# Patient Record
Sex: FEMALE | Race: WHITE | Employment: OTHER | URBAN - METROPOLITAN AREA
[De-identification: names, ages, dates, MRNs, and addresses within clinical notes are randomized per-mention and may not be internally consistent; named-entity substitution may affect disease eponyms.]

---

## 2017-03-14 RX ORDER — PANTOPRAZOLE SODIUM 40 MG/1
40 TABLET, DELAYED RELEASE ORAL DAILY
Qty: 30 TABLET | Refills: 3 | Status: SHIPPED | OUTPATIENT
Start: 2017-03-14 | End: 2017-07-12 | Stop reason: SDUPTHER

## 2017-06-23 ENCOUNTER — OFFICE VISIT (OUTPATIENT)
Dept: FAMILY MEDICINE CLINIC | Age: 62
End: 2017-06-23
Payer: COMMERCIAL

## 2017-06-23 VITALS
DIASTOLIC BLOOD PRESSURE: 70 MMHG | RESPIRATION RATE: 20 BRPM | WEIGHT: 189 LBS | HEART RATE: 64 BPM | SYSTOLIC BLOOD PRESSURE: 100 MMHG | BODY MASS INDEX: 33.49 KG/M2 | TEMPERATURE: 97.5 F | HEIGHT: 63 IN

## 2017-06-23 DIAGNOSIS — R23.8 SKIN BULLA: Primary | ICD-10-CM

## 2017-06-23 PROCEDURE — 3014F SCREEN MAMMO DOC REV: CPT | Performed by: NURSE PRACTITIONER

## 2017-06-23 PROCEDURE — 1036F TOBACCO NON-USER: CPT | Performed by: NURSE PRACTITIONER

## 2017-06-23 PROCEDURE — G8417 CALC BMI ABV UP PARAM F/U: HCPCS | Performed by: NURSE PRACTITIONER

## 2017-06-23 PROCEDURE — G8427 DOCREV CUR MEDS BY ELIG CLIN: HCPCS | Performed by: NURSE PRACTITIONER

## 2017-06-23 PROCEDURE — 99213 OFFICE O/P EST LOW 20 MIN: CPT | Performed by: NURSE PRACTITIONER

## 2017-06-23 PROCEDURE — 3017F COLORECTAL CA SCREEN DOC REV: CPT | Performed by: NURSE PRACTITIONER

## 2017-06-26 ENCOUNTER — OFFICE VISIT (OUTPATIENT)
Dept: FAMILY MEDICINE CLINIC | Age: 62
End: 2017-06-26
Payer: COMMERCIAL

## 2017-06-26 ENCOUNTER — HOSPITAL ENCOUNTER (OUTPATIENT)
Age: 62
Setting detail: SPECIMEN
Discharge: HOME | End: 2017-06-26
Payer: COMMERCIAL

## 2017-06-26 VITALS
RESPIRATION RATE: 20 BRPM | HEART RATE: 64 BPM | HEIGHT: 63 IN | TEMPERATURE: 97.6 F | SYSTOLIC BLOOD PRESSURE: 96 MMHG | BODY MASS INDEX: 33.49 KG/M2 | DIASTOLIC BLOOD PRESSURE: 68 MMHG | WEIGHT: 189 LBS

## 2017-06-26 DIAGNOSIS — R23.8 SKIN BULLA: Primary | ICD-10-CM

## 2017-06-26 PROCEDURE — G8427 DOCREV CUR MEDS BY ELIG CLIN: HCPCS | Performed by: NURSE PRACTITIONER

## 2017-06-26 PROCEDURE — 3014F SCREEN MAMMO DOC REV: CPT | Performed by: NURSE PRACTITIONER

## 2017-06-26 PROCEDURE — 1036F TOBACCO NON-USER: CPT | Performed by: NURSE PRACTITIONER

## 2017-06-26 PROCEDURE — 99212 OFFICE O/P EST SF 10 MIN: CPT | Performed by: NURSE PRACTITIONER

## 2017-06-26 PROCEDURE — 3017F COLORECTAL CA SCREEN DOC REV: CPT | Performed by: NURSE PRACTITIONER

## 2017-06-26 PROCEDURE — 10060 I&D ABSCESS SIMPLE/SINGLE: CPT | Performed by: NURSE PRACTITIONER

## 2017-06-26 PROCEDURE — G8417 CALC BMI ABV UP PARAM F/U: HCPCS | Performed by: NURSE PRACTITIONER

## 2017-06-28 RX ORDER — METFORMIN HYDROCHLORIDE 500 MG/1
500 TABLET, EXTENDED RELEASE ORAL
Qty: 30 TABLET | Refills: 5 | Status: SHIPPED | OUTPATIENT
Start: 2017-06-28 | End: 2017-09-18 | Stop reason: SDUPTHER

## 2017-06-29 ENCOUNTER — OFFICE VISIT (OUTPATIENT)
Dept: FAMILY MEDICINE CLINIC | Age: 62
End: 2017-06-29

## 2017-06-29 VITALS
BODY MASS INDEX: 33.13 KG/M2 | SYSTOLIC BLOOD PRESSURE: 114 MMHG | TEMPERATURE: 98.4 F | WEIGHT: 187 LBS | RESPIRATION RATE: 18 BRPM | DIASTOLIC BLOOD PRESSURE: 78 MMHG | HEART RATE: 64 BPM

## 2017-06-29 DIAGNOSIS — R23.8 SKIN BULLA: Primary | ICD-10-CM

## 2017-06-29 LAB
CULTURE: ABNORMAL
CULTURE: ABNORMAL
DIRECT EXAM: ABNORMAL
DIRECT EXAM: ABNORMAL
Lab: ABNORMAL
SPECIMEN DESCRIPTION: ABNORMAL
STATUS: ABNORMAL

## 2017-06-29 PROCEDURE — 99024 POSTOP FOLLOW-UP VISIT: CPT | Performed by: NURSE PRACTITIONER

## 2017-06-29 PROCEDURE — 3017F COLORECTAL CA SCREEN DOC REV: CPT | Performed by: NURSE PRACTITIONER

## 2017-06-29 PROCEDURE — G8427 DOCREV CUR MEDS BY ELIG CLIN: HCPCS | Performed by: NURSE PRACTITIONER

## 2017-06-29 PROCEDURE — 1036F TOBACCO NON-USER: CPT | Performed by: NURSE PRACTITIONER

## 2017-06-29 PROCEDURE — G8417 CALC BMI ABV UP PARAM F/U: HCPCS | Performed by: NURSE PRACTITIONER

## 2017-06-29 PROCEDURE — 3014F SCREEN MAMMO DOC REV: CPT | Performed by: NURSE PRACTITIONER

## 2017-06-29 RX ORDER — MUPIROCIN CALCIUM 20 MG/G
CREAM TOPICAL
Qty: 1 TUBE | Refills: 0 | Status: SHIPPED | OUTPATIENT
Start: 2017-06-29 | End: 2017-07-26 | Stop reason: SDUPTHER

## 2017-06-30 ASSESSMENT — ENCOUNTER SYMPTOMS
COUGH: 0
ABDOMINAL PAIN: 0

## 2017-07-26 ENCOUNTER — OFFICE VISIT (OUTPATIENT)
Dept: FAMILY MEDICINE CLINIC | Age: 62
End: 2017-07-26
Payer: COMMERCIAL

## 2017-07-26 VITALS
HEART RATE: 78 BPM | DIASTOLIC BLOOD PRESSURE: 70 MMHG | BODY MASS INDEX: 33.17 KG/M2 | WEIGHT: 187.2 LBS | HEIGHT: 63 IN | RESPIRATION RATE: 16 BRPM | SYSTOLIC BLOOD PRESSURE: 112 MMHG

## 2017-07-26 DIAGNOSIS — N90.89 VULVAR IRRITATION: ICD-10-CM

## 2017-07-26 DIAGNOSIS — M25.551 RIGHT HIP PAIN: ICD-10-CM

## 2017-07-26 DIAGNOSIS — M54.50 CHRONIC BILATERAL LOW BACK PAIN WITHOUT SCIATICA: ICD-10-CM

## 2017-07-26 DIAGNOSIS — N95.2 ATROPHIC VAGINITIS: ICD-10-CM

## 2017-07-26 DIAGNOSIS — G89.29 CHRONIC BILATERAL LOW BACK PAIN WITHOUT SCIATICA: ICD-10-CM

## 2017-07-26 DIAGNOSIS — R00.2 HEART PALPITATIONS: ICD-10-CM

## 2017-07-26 DIAGNOSIS — R23.8 SKIN BULLA: Primary | ICD-10-CM

## 2017-07-26 DIAGNOSIS — M25.552 LEFT HIP PAIN: ICD-10-CM

## 2017-07-26 DIAGNOSIS — M10.9 ACUTE GOUT OF RIGHT FOOT, UNSPECIFIED CAUSE: ICD-10-CM

## 2017-07-26 PROCEDURE — 3014F SCREEN MAMMO DOC REV: CPT | Performed by: PEDIATRICS

## 2017-07-26 PROCEDURE — G8417 CALC BMI ABV UP PARAM F/U: HCPCS | Performed by: PEDIATRICS

## 2017-07-26 PROCEDURE — G8427 DOCREV CUR MEDS BY ELIG CLIN: HCPCS | Performed by: PEDIATRICS

## 2017-07-26 PROCEDURE — 1036F TOBACCO NON-USER: CPT | Performed by: PEDIATRICS

## 2017-07-26 PROCEDURE — 99214 OFFICE O/P EST MOD 30 MIN: CPT | Performed by: PEDIATRICS

## 2017-07-26 PROCEDURE — 3017F COLORECTAL CA SCREEN DOC REV: CPT | Performed by: PEDIATRICS

## 2017-07-26 RX ORDER — MUPIROCIN CALCIUM 20 MG/G
CREAM TOPICAL
Qty: 30 G | Refills: 1 | Status: SHIPPED | OUTPATIENT
Start: 2017-07-26 | End: 2017-08-09

## 2017-07-26 RX ORDER — METHYLPREDNISOLONE 4 MG/1
TABLET ORAL
Qty: 1 KIT | Refills: 0 | Status: SHIPPED | OUTPATIENT
Start: 2017-07-26 | End: 2017-08-01

## 2017-07-26 RX ORDER — CLOBETASOL PROPIONATE 0.5 MG/G
OINTMENT TOPICAL
Qty: 60 G | Refills: 1 | Status: SHIPPED | OUTPATIENT
Start: 2017-07-26 | End: 2018-09-17 | Stop reason: SDUPTHER

## 2017-07-26 ASSESSMENT — ENCOUNTER SYMPTOMS
COLOR CHANGE: 0
SINUS PRESSURE: 0
STRIDOR: 0
EYE DISCHARGE: 0
SHORTNESS OF BREATH: 0
ABDOMINAL PAIN: 0
BACK PAIN: 1
COUGH: 0
CONSTIPATION: 0
PHOTOPHOBIA: 0
RHINORRHEA: 0
NAUSEA: 0
SORE THROAT: 0
EYE REDNESS: 0
DIARRHEA: 0
WHEEZING: 0
VOMITING: 0

## 2017-07-31 RX ORDER — LEVOTHYROXINE SODIUM 175 MCG
175 TABLET ORAL EVERY OTHER DAY
Qty: 15 TABLET | Refills: 1 | Status: SHIPPED | OUTPATIENT
Start: 2017-07-31 | End: 2017-09-29 | Stop reason: SDUPTHER

## 2017-07-31 RX ORDER — LEVOTHYROXINE SODIUM 150 MCG
150 TABLET ORAL EVERY OTHER DAY
Qty: 30 TABLET | Refills: 5 | Status: SHIPPED | OUTPATIENT
Start: 2017-07-31 | End: 2018-08-02 | Stop reason: SDUPTHER

## 2017-07-31 RX ORDER — ATORVASTATIN CALCIUM 20 MG/1
20 TABLET, FILM COATED ORAL DAILY
Qty: 30 TABLET | Refills: 5 | Status: SHIPPED | OUTPATIENT
Start: 2017-07-31 | End: 2018-09-17 | Stop reason: SDUPTHER

## 2017-09-13 ENCOUNTER — HOSPITAL ENCOUNTER (OUTPATIENT)
Age: 62
Setting detail: SPECIMEN
Discharge: HOME | End: 2017-09-13
Payer: COMMERCIAL

## 2017-09-13 DIAGNOSIS — E03.9 HYPOTHYROIDISM, UNSPECIFIED TYPE: ICD-10-CM

## 2017-09-13 DIAGNOSIS — E78.5 HYPERLIPIDEMIA, UNSPECIFIED HYPERLIPIDEMIA TYPE: ICD-10-CM

## 2017-09-13 DIAGNOSIS — E66.9 DIABETES MELLITUS TYPE 2 IN OBESE (HCC): Chronic | ICD-10-CM

## 2017-09-13 DIAGNOSIS — E11.69 DIABETES MELLITUS TYPE 2 IN OBESE (HCC): Chronic | ICD-10-CM

## 2017-09-13 DIAGNOSIS — D72.829 LEUKOCYTOSIS, UNSPECIFIED TYPE: ICD-10-CM

## 2017-09-13 LAB
ALBUMIN SERPL-MCNC: 4.4 G/DL (ref 3.5–5.2)
ALBUMIN/GLOBULIN RATIO: 1.6 (ref 1–2.5)
ALP BLD-CCNC: 63 U/L (ref 35–104)
ALT SERPL-CCNC: 27 U/L (ref 5–33)
ANION GAP SERPL CALCULATED.3IONS-SCNC: 15 MMOL/L (ref 9–17)
AST SERPL-CCNC: 28 U/L
BILIRUB SERPL-MCNC: 0.46 MG/DL (ref 0.3–1.2)
BUN BLDV-MCNC: 19 MG/DL (ref 8–23)
BUN/CREAT BLD: ABNORMAL (ref 9–20)
CALCIUM SERPL-MCNC: 9.7 MG/DL (ref 8.6–10.4)
CHLORIDE BLD-SCNC: 99 MMOL/L (ref 98–107)
CHOLESTEROL/HDL RATIO: 3.3
CHOLESTEROL: 114 MG/DL
CO2: 26 MMOL/L (ref 20–31)
CREAT SERPL-MCNC: 0.85 MG/DL (ref 0.5–0.9)
CREATININE URINE: 122 MG/DL (ref 28–217)
ESTIMATED AVERAGE GLUCOSE: 160 MG/DL
GFR AFRICAN AMERICAN: >60 ML/MIN
GFR NON-AFRICAN AMERICAN: >60 ML/MIN
GFR SERPL CREATININE-BSD FRML MDRD: ABNORMAL ML/MIN/{1.73_M2}
GFR SERPL CREATININE-BSD FRML MDRD: ABNORMAL ML/MIN/{1.73_M2}
GLUCOSE BLD-MCNC: 120 MG/DL (ref 70–99)
HBA1C MFR BLD: 7.2 % (ref 4–6)
HCT VFR BLD CALC: 40.3 % (ref 36–46)
HDLC SERPL-MCNC: 35 MG/DL
HEMOGLOBIN: 13.6 G/DL (ref 12–16)
LDL CHOLESTEROL: 40 MG/DL (ref 0–130)
MCH RBC QN AUTO: 28.5 PG (ref 26–34)
MCHC RBC AUTO-ENTMCNC: 33.7 G/DL (ref 31–37)
MCV RBC AUTO: 84.3 FL (ref 80–100)
MICROALBUMIN/CREAT 24H UR: <12 MG/L
MICROALBUMIN/CREAT UR-RTO: 10 MCG/MG CREAT
PDW BLD-RTO: 13.9 % (ref 12.5–15.4)
PLATELET # BLD: 281 K/UL (ref 140–450)
PMV BLD AUTO: 8.8 FL (ref 6–12)
POTASSIUM SERPL-SCNC: 4.3 MMOL/L (ref 3.7–5.3)
RBC # BLD: 4.78 M/UL (ref 4–5.2)
SODIUM BLD-SCNC: 140 MMOL/L (ref 135–144)
TOTAL PROTEIN: 7.2 G/DL (ref 6.4–8.3)
TRIGL SERPL-MCNC: 195 MG/DL
TSH SERPL DL<=0.05 MIU/L-ACNC: 1.9 MIU/L (ref 0.3–5)
VLDLC SERPL CALC-MCNC: ABNORMAL MG/DL (ref 1–30)
WBC # BLD: 6.9 K/UL (ref 3.5–11)

## 2017-09-18 DIAGNOSIS — E11.69 DIABETES MELLITUS TYPE 2 IN OBESE (HCC): Primary | Chronic | ICD-10-CM

## 2017-09-18 DIAGNOSIS — E78.5 HYPERLIPIDEMIA, UNSPECIFIED HYPERLIPIDEMIA TYPE: ICD-10-CM

## 2017-09-18 DIAGNOSIS — E66.9 DIABETES MELLITUS TYPE 2 IN OBESE (HCC): Primary | Chronic | ICD-10-CM

## 2017-09-18 DIAGNOSIS — I10 ESSENTIAL HYPERTENSION: Chronic | ICD-10-CM

## 2017-09-18 RX ORDER — METFORMIN HYDROCHLORIDE 500 MG/1
1000 TABLET, EXTENDED RELEASE ORAL
Qty: 60 TABLET | Refills: 5 | Status: SHIPPED | OUTPATIENT
Start: 2017-09-18 | End: 2018-09-20 | Stop reason: SDUPTHER

## 2017-09-18 RX ORDER — AZITHROMYCIN 250 MG/1
TABLET, FILM COATED ORAL
Qty: 1 PACKET | Refills: 0 | Status: SHIPPED | OUTPATIENT
Start: 2017-09-18 | End: 2017-09-28

## 2017-09-29 RX ORDER — LEVOTHYROXINE SODIUM 175 MCG
175 TABLET ORAL EVERY OTHER DAY
Qty: 15 TABLET | Refills: 5 | Status: SHIPPED | OUTPATIENT
Start: 2017-09-29 | End: 2018-03-28 | Stop reason: SDUPTHER

## 2017-10-19 DIAGNOSIS — Z12.39 SCREENING FOR BREAST CANCER: ICD-10-CM

## 2017-11-17 ENCOUNTER — HOSPITAL ENCOUNTER (OUTPATIENT)
Age: 62
Setting detail: SPECIMEN
Discharge: HOME | End: 2017-11-17
Payer: COMMERCIAL

## 2017-11-17 ENCOUNTER — OFFICE VISIT (OUTPATIENT)
Dept: FAMILY MEDICINE CLINIC | Age: 62
End: 2017-11-17
Payer: COMMERCIAL

## 2017-11-17 VITALS
SYSTOLIC BLOOD PRESSURE: 112 MMHG | HEART RATE: 72 BPM | WEIGHT: 186 LBS | TEMPERATURE: 97.6 F | DIASTOLIC BLOOD PRESSURE: 68 MMHG | BODY MASS INDEX: 32.96 KG/M2 | RESPIRATION RATE: 20 BRPM

## 2017-11-17 DIAGNOSIS — E11.9 CONTROLLED TYPE 2 DIABETES MELLITUS WITHOUT COMPLICATION, WITHOUT LONG-TERM CURRENT USE OF INSULIN (HCC): ICD-10-CM

## 2017-11-17 DIAGNOSIS — E03.9 HYPOTHYROIDISM, UNSPECIFIED TYPE: ICD-10-CM

## 2017-11-17 DIAGNOSIS — Z13.820 SCREENING FOR OSTEOPOROSIS: ICD-10-CM

## 2017-11-17 DIAGNOSIS — Z12.89 ENCOUNTER FOR PELVIC SCREENING FOR MALIGNANT NEOPLASM: ICD-10-CM

## 2017-11-17 DIAGNOSIS — N95.2 ATROPHIC VAGINITIS: ICD-10-CM

## 2017-11-17 DIAGNOSIS — E78.5 HYPERLIPIDEMIA, UNSPECIFIED HYPERLIPIDEMIA TYPE: ICD-10-CM

## 2017-11-17 DIAGNOSIS — I10 ESSENTIAL HYPERTENSION: ICD-10-CM

## 2017-11-17 DIAGNOSIS — Z01.419 ENCOUNTER FOR ROUTINE GYNECOLOGICAL EXAMINATION WITH PAPANICOLAOU SMEAR OF CERVIX: Primary | ICD-10-CM

## 2017-11-17 DIAGNOSIS — F41.9 ANXIETY: ICD-10-CM

## 2017-11-17 DIAGNOSIS — M10.9 GOUT OF FOOT, UNSPECIFIED CAUSE, UNSPECIFIED CHRONICITY, UNSPECIFIED LATERALITY: ICD-10-CM

## 2017-11-17 DIAGNOSIS — Z12.39 SCREENING BREAST EXAMINATION: ICD-10-CM

## 2017-11-17 DIAGNOSIS — Z23 NEED FOR INFLUENZA VACCINATION: ICD-10-CM

## 2017-11-17 DIAGNOSIS — K21.9 GASTROESOPHAGEAL REFLUX DISEASE, ESOPHAGITIS PRESENCE NOT SPECIFIED: ICD-10-CM

## 2017-11-17 DIAGNOSIS — N89.8 VAGINAL ODOR: ICD-10-CM

## 2017-11-17 LAB
-: NORMAL
AMORPHOUS: NORMAL
BACTERIA: NORMAL
BILIRUBIN URINE: NEGATIVE
CASTS UA: NORMAL /LPF (ref 0–8)
COLOR: YELLOW
COMMENT UA: ABNORMAL
CRYSTALS, UA: NORMAL /HPF
EPITHELIAL CELLS UA: NORMAL /HPF (ref 0–5)
GLUCOSE URINE: NEGATIVE
KETONES, URINE: NEGATIVE
LEUKOCYTE ESTERASE, URINE: ABNORMAL
MUCUS: NORMAL
NITRITE, URINE: NEGATIVE
OTHER OBSERVATIONS UA: NORMAL
PH UA: 7 (ref 5–8)
PROTEIN UA: NEGATIVE
RBC UA: NORMAL /HPF (ref 0–4)
RENAL EPITHELIAL, UA: NORMAL /HPF
SPECIFIC GRAVITY UA: 1.01 (ref 1–1.03)
TRICHOMONAS: NORMAL
TURBIDITY: CLEAR
URINE HGB: NEGATIVE
UROBILINOGEN, URINE: NORMAL
WBC UA: NORMAL /HPF (ref 0–5)
YEAST: NORMAL

## 2017-11-17 PROCEDURE — 99396 PREV VISIT EST AGE 40-64: CPT | Performed by: PEDIATRICS

## 2017-11-17 PROCEDURE — 90688 IIV4 VACCINE SPLT 0.5 ML IM: CPT | Performed by: PEDIATRICS

## 2017-11-17 PROCEDURE — 90471 IMMUNIZATION ADMIN: CPT | Performed by: PEDIATRICS

## 2017-11-17 RX ORDER — COLCHICINE 0.6 MG/1
TABLET ORAL
Qty: 24 TABLET | Refills: 3 | Status: SHIPPED | OUTPATIENT
Start: 2017-11-17 | End: 2018-09-17 | Stop reason: SDUPTHER

## 2017-11-17 RX ORDER — LORAZEPAM 0.5 MG/1
0.5 TABLET ORAL EVERY 8 HOURS PRN
Qty: 30 TABLET | Refills: 0 | Status: SHIPPED | OUTPATIENT
Start: 2017-11-17 | End: 2019-01-16 | Stop reason: SDUPTHER

## 2017-11-17 ASSESSMENT — PATIENT HEALTH QUESTIONNAIRE - PHQ9
SUM OF ALL RESPONSES TO PHQ QUESTIONS 1-9: 0
1. LITTLE INTEREST OR PLEASURE IN DOING THINGS: 0
2. FEELING DOWN, DEPRESSED OR HOPELESS: 0
SUM OF ALL RESPONSES TO PHQ9 QUESTIONS 1 & 2: 0

## 2017-11-17 ASSESSMENT — ENCOUNTER SYMPTOMS
VOMITING: 0
BACK PAIN: 0
SORE THROAT: 0
CONSTIPATION: 0
NAUSEA: 0
ABDOMINAL PAIN: 0
DIARRHEA: 0

## 2017-11-17 NOTE — PROGRESS NOTES
Subjective:      Patient ID: Rosa Vargas is a 64 y.o. female. Visit Information    Have you changed or started any medications since your last visit including any over-the-counter medicines, vitamins, or herbal medicines? no   Are you having any side effects from any of your medications? -  no  Have you stopped taking any of your medications? Is so, why? -  no    Have you seen any other physician or provider since your last visit? No  Have you had any other diagnostic tests since your last visit? No  Have you been seen in the emergency room and/or had an admission to a hospital since we last saw you? No  Have you had your routine dental cleaning in the past 6 months? yes -     Have you activated your [employer] account? If not, what are your barriers? Yes     Patient Care Team:  Balta Frias MD as PCP - General (Pediatrics)    Medical History Review  Past Medical, Family, and Social History reviewed and does contribute to the patient presenting condition    Health Maintenance   Topic Date Due    Colon cancer screen colonoscopy  05/19/2015    Diabetic retinal exam  03/01/2016    Hepatitis C screen  01/17/2018 (Originally 1955)    HIV screen  01/17/2018 (Originally 12/25/1970)    Zostavax vaccine  11/16/2018 (Originally 12/25/2015)    Pneumococcal med risk (1 of 1 - PPSV23) 11/16/2018 (Originally 12/25/1974)    Cervical cancer screen  09/07/2018    Diabetic hemoglobin A1C test  09/13/2018    Diabetic microalbuminuria test  09/13/2018    Lipid screen  09/13/2018    Diabetic foot exam  11/17/2018    Breast cancer screen  10/18/2019    Flu vaccine  Completed       Patient presents today for routine pap and pelvic exam.  She previously had her pap tests with Dr. Salvatore Fowler. She is post menopausal and denies any post menopausal bleeding or vaginal discharge. She does tell me that she noticed an odor at times. She denies any pain or other problems.  She does occasionally perform a self esophagitis presence not specified     11. Anxiety  LORazepam (ATIVAN) 0.5 MG tablet   12. Gout of foot, unspecified cause, unspecified chronicity, unspecified laterality  colchicine (COLCRYS) 0.6 MG tablet   13. Screening for osteoporosis  DEXA Bone Density 2 Sites   14. Need for influenza vaccination  INFLUENZA, QUADV, 3 YRS AND OLDER, IM, MDV, 0.5ML (Dunia Boss)           Plan:      Proceed with obtain Pap smear  Recommend reassurance that vaginal odor may just be related to hormone change / menopause  Consider testing for BV or other infectious etiologies if no improvement   Recommend monthly self breast exams  Advise yearly mammogram   Obtain Dexascan  Recommend daily calcium and vitamin D supplementation   Weight bearing exercise daily / healthy diabetic diet  Recommend continue to monitor blood sugars daily   Refill ativan to use as needed for anxiety  Recommend flu vaccine today   Subspecialty follow up as scheduled  She refuses pneumonia vaccine, shingles vaccine, HIV and hep C screening   She is due for a diabetes eye exam  Recommend colonoscopy  Call with concerns     Rob Roy received counseling on the following healthy behaviors: nutrition, exercise and medication adherence  Reviewed prior labs and health maintenance  Continue current medications, diet and exercise. Discussed use, benefit, and side effects of prescribed medications. Barriers to medication compliance addressed. Patient given educational materials - see patient instructions  Was a self-tracking handout given in paper form or via [employer]? No    Requested Prescriptions     Signed Prescriptions Disp Refills    LORazepam (ATIVAN) 0.5 MG tablet 30 tablet 0     Sig: Take 1 tablet by mouth every 8 hours as needed for Anxiety .  colchicine (COLCRYS) 0.6 MG tablet 24 tablet 3     Sig: Take  2 tablets now and may take one tablet in 1 hour if pain if not improving , then take 3 times a day x 7 days       All patient questions answered.

## 2017-11-18 LAB
CULTURE: NO GROWTH
CULTURE: NORMAL
Lab: NORMAL
SPECIMEN DESCRIPTION: NORMAL
STATUS: NORMAL

## 2017-11-21 ASSESSMENT — ENCOUNTER SYMPTOMS
EYE REDNESS: 0
COUGH: 0
STRIDOR: 0
SHORTNESS OF BREATH: 0
RHINORRHEA: 0
COLOR CHANGE: 0
PHOTOPHOBIA: 0
EYE PAIN: 0
EYE DISCHARGE: 0
WHEEZING: 0

## 2017-11-27 LAB — CYTOLOGY REPORT: NORMAL

## 2018-01-11 RX ORDER — PANTOPRAZOLE SODIUM 40 MG/1
40 TABLET, DELAYED RELEASE ORAL DAILY
Qty: 30 TABLET | Refills: 5 | Status: SHIPPED | OUTPATIENT
Start: 2018-01-11 | End: 2018-07-13 | Stop reason: SDUPTHER

## 2018-01-11 NOTE — TELEPHONE ENCOUNTER
LOV 11-17-17  LR 7-12-17    Health Maintenance   Topic Date Due    Colon cancer screen colonoscopy  05/19/2015    Diabetic retinal exam  03/01/2016    Hepatitis C screen  01/17/2018 (Originally 1955)    HIV screen  01/17/2018 (Originally 12/25/1970)    Zostavax vaccine  11/16/2018 (Originally 12/25/2015)    Pneumococcal med risk (1 of 1 - PPSV23) 11/16/2018 (Originally 12/25/1974)    A1C test (Diabetic or Prediabetic)  09/13/2018    Diabetic microalbuminuria test  09/13/2018    Lipid screen  09/13/2018    TSH testing  09/13/2018    Potassium monitoring  09/13/2018    Creatinine monitoring  09/13/2018    Diabetic foot exam  11/17/2018    Breast cancer screen  10/18/2019    Cervical cancer screen  11/17/2020    Flu vaccine  Completed             (applicable per patient's age: Cancer Screenings, Depression Screening, Fall Risk Screening, Immunizations)    Hemoglobin A1C (%)   Date Value   09/13/2017 7.2 (H)   11/22/2016 7.0 (H)   04/13/2015 7.2     Microalb/Crt. Ratio (mcg/mg creat)   Date Value   09/13/2017 10     LDL Cholesterol (mg/dL)   Date Value   09/13/2017 40     LDL Calculated (mg/dL)   Date Value   04/13/2015 32     AST (U/L)   Date Value   09/13/2017 28     ALT (U/L)   Date Value   09/13/2017 27     BUN (mg/dL)   Date Value   09/13/2017 19      (goal A1C is < 7)   (goal LDL is <100) need 30-50% reduction from baseline     BP Readings from Last 3 Encounters:   11/17/17 112/68   07/26/17 112/70   06/29/17 114/78    (goal /80)      All Future Testing planned in [facility]:  Lab Frequency Next Occurrence   XR LUMBAR SPINE FLEXION AND EXTENSION ONLY Once 07/26/2017   Comprehensive Metabolic Panel Once 64/35/0113   Hemoglobin A1C Once 12/17/2017   PAP Smear Once 11/17/2017   DEXA Bone Density 2 Sites Once 11/17/2017       Next Visit Date:  No future appointments.          Patient Active Problem List:     Hypertension     Diabetes mellitus type 2 in [facility])     Hypothyroidism

## 2018-03-20 ENCOUNTER — TELEPHONE (OUTPATIENT)
Dept: FAMILY MEDICINE CLINIC | Age: 63
End: 2018-03-20

## 2018-03-20 DIAGNOSIS — J06.9 UPPER RESPIRATORY TRACT INFECTION, UNSPECIFIED TYPE: Primary | ICD-10-CM

## 2018-03-20 RX ORDER — METHYLPREDNISOLONE 4 MG/1
TABLET ORAL
Qty: 1 KIT | Refills: 0 | Status: SHIPPED | OUTPATIENT
Start: 2018-03-20 | End: 2018-03-26

## 2018-03-20 RX ORDER — AZITHROMYCIN 250 MG/1
TABLET, FILM COATED ORAL
Qty: 6 TABLET | Refills: 0 | Status: SHIPPED | OUTPATIENT
Start: 2018-03-20 | End: 2018-03-30

## 2018-03-20 RX ORDER — PROMETHAZINE HYDROCHLORIDE, PHENYLEPHRINE HYDROCHLORIDE AND CODEINE PHOSPHATE 6.25; 5; 1 MG/5ML; MG/5ML; MG/5ML
5 SOLUTION ORAL EVERY 6 HOURS PRN
Qty: 118 ML | Refills: 0 | Status: SHIPPED | OUTPATIENT
Start: 2018-03-20 | End: 2018-03-27

## 2018-03-20 NOTE — TELEPHONE ENCOUNTER
Acute respiratory issue     Patient complains of possible sinusitis  Symptoms for how long 1 week  What meds has pt tried Mucinex, Vicks Rub  Does patient have asthma Yes  Is patient on inhalers No  Other symptoms include productive cough with  green and thick colored sputum  Is this sinus, cold or cough related Yes    Patient is requesting an antibiotic or steroid but does not want tessalon perles. Not opposed to cough syrup. She did state that Carolyn Traore did work last Sept when she took it.     Electronically signed by Theorem on 3/20/2018 at 10:28 AM

## 2018-03-29 RX ORDER — LEVOTHYROXINE SODIUM 175 MCG
175 TABLET ORAL EVERY OTHER DAY
Qty: 15 TABLET | Refills: 5 | Status: SHIPPED | OUTPATIENT
Start: 2018-03-29 | End: 2018-10-01 | Stop reason: SDUPTHER

## 2018-04-18 ENCOUNTER — TELEPHONE (OUTPATIENT)
Dept: FAMILY MEDICINE CLINIC | Age: 63
End: 2018-04-18

## 2018-04-18 DIAGNOSIS — M25.532 LEFT WRIST PAIN: Primary | ICD-10-CM

## 2018-04-19 ENCOUNTER — TELEPHONE (OUTPATIENT)
Dept: FAMILY MEDICINE CLINIC | Age: 63
End: 2018-04-19

## 2018-04-19 DIAGNOSIS — R29.898 WRIST WEAKNESS: ICD-10-CM

## 2018-04-19 DIAGNOSIS — M25.532 LEFT WRIST PAIN: Primary | ICD-10-CM

## 2018-05-17 ENCOUNTER — TELEPHONE (OUTPATIENT)
Dept: FAMILY MEDICINE CLINIC | Age: 63
End: 2018-05-17

## 2018-05-18 ENCOUNTER — OFFICE VISIT (OUTPATIENT)
Dept: FAMILY MEDICINE CLINIC | Age: 63
End: 2018-05-18
Payer: COMMERCIAL

## 2018-05-18 VITALS
BODY MASS INDEX: 32.25 KG/M2 | TEMPERATURE: 98 F | HEART RATE: 78 BPM | DIASTOLIC BLOOD PRESSURE: 82 MMHG | WEIGHT: 182 LBS | RESPIRATION RATE: 16 BRPM | SYSTOLIC BLOOD PRESSURE: 118 MMHG

## 2018-05-18 DIAGNOSIS — Z12.11 SCREEN FOR COLON CANCER: ICD-10-CM

## 2018-05-18 DIAGNOSIS — R09.89 CHEST CONGESTION: Primary | ICD-10-CM

## 2018-05-18 DIAGNOSIS — R06.2 WHEEZING: ICD-10-CM

## 2018-05-18 PROCEDURE — 99213 OFFICE O/P EST LOW 20 MIN: CPT | Performed by: NURSE PRACTITIONER

## 2018-05-18 RX ORDER — ALBUTEROL SULFATE 90 UG/1
2 AEROSOL, METERED RESPIRATORY (INHALATION) EVERY 6 HOURS PRN
Qty: 1 INHALER | Refills: 0 | Status: SHIPPED | OUTPATIENT
Start: 2018-05-18 | End: 2018-09-17

## 2018-05-18 RX ORDER — PREDNISONE 20 MG/1
40 TABLET ORAL DAILY
Qty: 10 TABLET | Refills: 0 | Status: SHIPPED | OUTPATIENT
Start: 2018-05-18 | End: 2018-05-23

## 2018-05-18 ASSESSMENT — ENCOUNTER SYMPTOMS
SORE THROAT: 1
WHEEZING: 1
COUGH: 1
NAUSEA: 1

## 2018-05-27 ASSESSMENT — ENCOUNTER SYMPTOMS
SHORTNESS OF BREATH: 0
VOMITING: 0
FACIAL SWELLING: 0
DIARRHEA: 0
ABDOMINAL PAIN: 0
CHEST TIGHTNESS: 1
TROUBLE SWALLOWING: 0
BACK PAIN: 0
CONSTIPATION: 0
RHINORRHEA: 0

## 2018-06-26 RX ORDER — ATORVASTATIN CALCIUM 20 MG/1
20 TABLET, FILM COATED ORAL DAILY
Qty: 30 TABLET | Refills: 5 | Status: SHIPPED | OUTPATIENT
Start: 2018-06-26 | End: 2018-12-27 | Stop reason: SDUPTHER

## 2018-07-05 ENCOUNTER — TELEPHONE (OUTPATIENT)
Dept: FAMILY MEDICINE CLINIC | Age: 63
End: 2018-07-05

## 2018-07-16 RX ORDER — PANTOPRAZOLE SODIUM 40 MG/1
40 TABLET, DELAYED RELEASE ORAL DAILY
Qty: 30 TABLET | Refills: 5 | Status: SHIPPED | OUTPATIENT
Start: 2018-07-16 | End: 2018-12-27 | Stop reason: SDUPTHER

## 2018-07-16 NOTE — TELEPHONE ENCOUNTER
OV 11/17/17  LRF 1/11/18  RTO 1 yr    Health Maintenance   Topic Date Due    Hepatitis C screen  1955    HIV screen  12/25/1970    Colon cancer screen colonoscopy  05/19/2015    Diabetic retinal exam  03/01/2016    Pneumococcal med risk (1 of 1 - PPSV23) 11/16/2018 (Originally 12/25/1974)    Shingles Vaccine (1 of 2 - 2 Dose Series) 05/18/2019 (Originally 12/25/2005)    Flu vaccine (1) 09/01/2018    A1C test (Diabetic or Prediabetic)  09/13/2018    Diabetic microalbuminuria test  09/13/2018    Lipid screen  09/13/2018    TSH testing  09/13/2018    Diabetic foot exam  11/17/2018    Breast cancer screen  10/18/2019    Cervical cancer screen  11/17/2020             (applicable per patient's age: Cancer Screenings, Depression Screening, Fall Risk Screening, Immunizations)    Hemoglobin A1C (%)   Date Value   09/13/2017 7.2 (H)   11/22/2016 7.0 (H)   04/13/2015 7.2     Microalb/Crt.  Ratio (mcg/mg creat)   Date Value   09/13/2017 10     LDL Cholesterol (mg/dL)   Date Value   09/13/2017 40     LDL Calculated (mg/dL)   Date Value   04/13/2015 32     AST (U/L)   Date Value   09/13/2017 28     ALT (U/L)   Date Value   09/13/2017 27     BUN (mg/dL)   Date Value   09/13/2017 19      (goal A1C is < 7)   (goal LDL is <100) need 30-50% reduction from baseline     BP Readings from Last 3 Encounters:   05/18/18 118/82   11/17/17 112/68   07/26/17 112/70    (goal /80)      All Future Testing planned in [facility]:  Lab Frequency Next Occurrence   XR LUMBAR SPINE FLEXION AND EXTENSION ONLY Once 07/26/2017   Comprehensive Metabolic Panel Once 27/43/7371   Hemoglobin A1C Once 12/17/2017   PAP Smear Once 11/17/2017   DEXA Bone Density 2 Sites Once 11/17/2017   XR WRIST LEFT (MIN 3 VIEWS) Once 04/18/2018   POCT Fecal Immunochemical Test (FIT) Once 06/08/2018   XR CHEST STANDARD (2 VW) Once 05/18/2018       Next Visit Date:  Future Appointments  Date Time Provider Kati White   9/17/2018 8:30 AM [place]

## 2018-08-02 RX ORDER — LEVOTHYROXINE SODIUM 150 MCG
150 TABLET ORAL EVERY OTHER DAY
Qty: 15 TABLET | Refills: 5 | Status: SHIPPED | OUTPATIENT
Start: 2018-08-02 | End: 2019-01-21 | Stop reason: DRUGHIGH

## 2018-08-02 NOTE — TELEPHONE ENCOUNTER
mellitus type 2 in obese [facility])     Hypothyroidism     Hyperlipidemia     Esophageal reflux

## 2018-09-17 ENCOUNTER — TELEPHONE (OUTPATIENT)
Dept: FAMILY MEDICINE CLINIC | Age: 63
End: 2018-09-17

## 2018-09-17 ENCOUNTER — OFFICE VISIT (OUTPATIENT)
Dept: FAMILY MEDICINE CLINIC | Age: 63
End: 2018-09-17
Payer: COMMERCIAL

## 2018-09-17 ENCOUNTER — HOSPITAL ENCOUNTER (OUTPATIENT)
Age: 63
Setting detail: SPECIMEN
Discharge: HOME | End: 2018-09-17
Payer: COMMERCIAL

## 2018-09-17 VITALS
WEIGHT: 179 LBS | RESPIRATION RATE: 18 BRPM | DIASTOLIC BLOOD PRESSURE: 80 MMHG | HEART RATE: 72 BPM | SYSTOLIC BLOOD PRESSURE: 112 MMHG | TEMPERATURE: 98 F | BODY MASS INDEX: 31.72 KG/M2

## 2018-09-17 DIAGNOSIS — Z11.59 NEED FOR HEPATITIS C SCREENING TEST: ICD-10-CM

## 2018-09-17 DIAGNOSIS — J30.2 SEASONAL ALLERGIES: ICD-10-CM

## 2018-09-17 DIAGNOSIS — F41.9 ANXIETY: ICD-10-CM

## 2018-09-17 DIAGNOSIS — E03.9 HYPOTHYROIDISM, UNSPECIFIED TYPE: ICD-10-CM

## 2018-09-17 DIAGNOSIS — E11.9 CONTROLLED TYPE 2 DIABETES MELLITUS WITHOUT COMPLICATION, WITHOUT LONG-TERM CURRENT USE OF INSULIN (HCC): ICD-10-CM

## 2018-09-17 DIAGNOSIS — G89.29 CHRONIC PAIN OF RIGHT ANKLE: ICD-10-CM

## 2018-09-17 DIAGNOSIS — M1A.09X0 CHRONIC GOUT OF MULTIPLE SITES, UNSPECIFIED CAUSE: ICD-10-CM

## 2018-09-17 DIAGNOSIS — Z11.4 ENCOUNTER FOR SCREENING FOR HIV: ICD-10-CM

## 2018-09-17 DIAGNOSIS — N90.89 VULVAR IRRITATION: ICD-10-CM

## 2018-09-17 DIAGNOSIS — M25.571 CHRONIC PAIN OF RIGHT ANKLE: ICD-10-CM

## 2018-09-17 DIAGNOSIS — I10 ESSENTIAL HYPERTENSION: Chronic | ICD-10-CM

## 2018-09-17 DIAGNOSIS — E11.42 DIABETIC POLYNEUROPATHY ASSOCIATED WITH TYPE 2 DIABETES MELLITUS (HCC): ICD-10-CM

## 2018-09-17 DIAGNOSIS — Z23 NEED FOR INFLUENZA VACCINATION: ICD-10-CM

## 2018-09-17 DIAGNOSIS — R05.9 COUGH: ICD-10-CM

## 2018-09-17 DIAGNOSIS — M25.471 RIGHT ANKLE SWELLING: ICD-10-CM

## 2018-09-17 DIAGNOSIS — E78.5 HYPERLIPIDEMIA, UNSPECIFIED HYPERLIPIDEMIA TYPE: ICD-10-CM

## 2018-09-17 DIAGNOSIS — E11.42 TYPE 2 DIABETES MELLITUS WITH DIABETIC POLYNEUROPATHY, WITHOUT LONG-TERM CURRENT USE OF INSULIN (HCC): Primary | ICD-10-CM

## 2018-09-17 DIAGNOSIS — N95.2 ATROPHIC VAGINITIS: ICD-10-CM

## 2018-09-17 DIAGNOSIS — K21.9 GASTROESOPHAGEAL REFLUX DISEASE, ESOPHAGITIS PRESENCE NOT SPECIFIED: ICD-10-CM

## 2018-09-17 LAB
ALBUMIN SERPL-MCNC: 4.2 G/DL (ref 3.5–5.2)
ALBUMIN/GLOBULIN RATIO: 1.3 (ref 1–2.5)
ALP BLD-CCNC: 80 U/L (ref 35–104)
ALT SERPL-CCNC: 19 U/L (ref 5–33)
ANION GAP SERPL CALCULATED.3IONS-SCNC: 18 MMOL/L (ref 9–17)
AST SERPL-CCNC: 23 U/L
BILIRUB SERPL-MCNC: 0.41 MG/DL (ref 0.3–1.2)
BUN BLDV-MCNC: 15 MG/DL (ref 8–23)
BUN/CREAT BLD: ABNORMAL (ref 9–20)
CALCIUM SERPL-MCNC: 10 MG/DL (ref 8.6–10.4)
CHLORIDE BLD-SCNC: 98 MMOL/L (ref 98–107)
CHOLESTEROL/HDL RATIO: 3.5
CHOLESTEROL: 114 MG/DL
CO2: 24 MMOL/L (ref 20–31)
CREAT SERPL-MCNC: 0.86 MG/DL (ref 0.5–0.9)
CREATININE URINE: 95.2 MG/DL (ref 28–217)
GFR AFRICAN AMERICAN: >60 ML/MIN
GFR NON-AFRICAN AMERICAN: >60 ML/MIN
GFR SERPL CREATININE-BSD FRML MDRD: ABNORMAL ML/MIN/{1.73_M2}
GFR SERPL CREATININE-BSD FRML MDRD: ABNORMAL ML/MIN/{1.73_M2}
GLUCOSE BLD-MCNC: 109 MG/DL (ref 70–99)
HCT VFR BLD CALC: 41.3 % (ref 36.3–47.1)
HDLC SERPL-MCNC: 33 MG/DL
HEMOGLOBIN: 13.2 G/DL (ref 11.9–15.1)
HEPATITIS C ANTIBODY: NONREACTIVE
HIV AG/AB: NONREACTIVE
LDL CHOLESTEROL: 36 MG/DL (ref 0–130)
MCH RBC QN AUTO: 27.5 PG (ref 25.2–33.5)
MCHC RBC AUTO-ENTMCNC: 32 G/DL (ref 28.4–34.8)
MCV RBC AUTO: 86 FL (ref 82.6–102.9)
MICROALBUMIN/CREAT 24H UR: <12 MG/L
MICROALBUMIN/CREAT UR-RTO: NORMAL MCG/MG CREAT
NRBC AUTOMATED: 0 PER 100 WBC
PDW BLD-RTO: 12.7 % (ref 11.8–14.4)
PLATELET # BLD: 356 K/UL (ref 138–453)
PMV BLD AUTO: 10.1 FL (ref 8.1–13.5)
POTASSIUM SERPL-SCNC: 4.2 MMOL/L (ref 3.7–5.3)
RBC # BLD: 4.8 M/UL (ref 3.95–5.11)
SODIUM BLD-SCNC: 140 MMOL/L (ref 135–144)
THYROXINE, FREE: 1.8 NG/DL (ref 0.93–1.7)
TOTAL PROTEIN: 7.4 G/DL (ref 6.4–8.3)
TRIGL SERPL-MCNC: 223 MG/DL
TSH SERPL DL<=0.05 MIU/L-ACNC: 1.85 MIU/L (ref 0.3–5)
VLDLC SERPL CALC-MCNC: ABNORMAL MG/DL (ref 1–30)
WBC # BLD: 8.2 K/UL (ref 3.5–11.3)

## 2018-09-17 PROCEDURE — 99214 OFFICE O/P EST MOD 30 MIN: CPT | Performed by: PEDIATRICS

## 2018-09-17 PROCEDURE — 90688 IIV4 VACCINE SPLT 0.5 ML IM: CPT | Performed by: PEDIATRICS

## 2018-09-17 PROCEDURE — 90471 IMMUNIZATION ADMIN: CPT | Performed by: PEDIATRICS

## 2018-09-17 RX ORDER — COLCHICINE 0.6 MG/1
TABLET ORAL
Qty: 24 TABLET | Refills: 3 | Status: SHIPPED | OUTPATIENT
Start: 2018-09-17 | End: 2019-01-21 | Stop reason: ALTCHOICE

## 2018-09-17 RX ORDER — MONTELUKAST SODIUM 10 MG/1
10 TABLET ORAL DAILY
Qty: 30 TABLET | Refills: 5 | Status: SHIPPED | OUTPATIENT
Start: 2018-09-17 | End: 2019-11-11 | Stop reason: ALTCHOICE

## 2018-09-17 RX ORDER — CLOBETASOL PROPIONATE 0.5 MG/G
OINTMENT TOPICAL
Qty: 60 G | Refills: 1 | Status: SHIPPED | OUTPATIENT
Start: 2018-09-17 | End: 2021-01-29 | Stop reason: SDUPTHER

## 2018-09-17 ASSESSMENT — ENCOUNTER SYMPTOMS
BELCHING: 0
PHOTOPHOBIA: 0
ORTHOPNEA: 0
SORE THROAT: 0
EYE PAIN: 0
EYE REDNESS: 0
HEARTBURN: 0
SHORTNESS OF BREATH: 0
HOARSE VOICE: 0
WHEEZING: 0
GLOBUS SENSATION: 0
STRIDOR: 0
BLURRED VISION: 0
CHOKING: 0
NAUSEA: 0
WATER BRASH: 0
EYE DISCHARGE: 0
ABDOMINAL PAIN: 0

## 2018-09-17 ASSESSMENT — PATIENT HEALTH QUESTIONNAIRE - PHQ9
1. LITTLE INTEREST OR PLEASURE IN DOING THINGS: 0
2. FEELING DOWN, DEPRESSED OR HOPELESS: 0
SUM OF ALL RESPONSES TO PHQ9 QUESTIONS 1 & 2: 0
SUM OF ALL RESPONSES TO PHQ QUESTIONS 1-9: 0
SUM OF ALL RESPONSES TO PHQ QUESTIONS 1-9: 0

## 2018-09-17 NOTE — PROGRESS NOTES
Subjective:      Patient ID: Luisa Baugh is a 58 y.o. female. Visit Information    Have you changed or started any medications since your last visit including any over-the-counter medicines, vitamins, or herbal medicines? no   Are you having any side effects from any of your medications? -  no  Have you stopped taking any of your medications? Is so, why? -  no    Have you seen any other physician or provider since your last visit? Yes - Records Requested  Have you had any other diagnostic tests since your last visit? No  Have you been seen in the emergency room and/or had an admission to a hospital since we last saw you? No  Have you had your routine dental cleaning in the past 6 months? no    Have you activated your [employer] account? If not, what are your barriers?  Yes     Patient Care Team:  Ottoniel Bee MD as PCP - General (Pediatrics)    Medical History Review  Past Medical, Family, and Social History reviewed and does contribute to the patient presenting condition    Health Maintenance   Topic Date Due    Colon cancer screen colonoscopy  05/19/2015    Diabetic retinal exam  03/01/2016    A1C test (Diabetic or Prediabetic)  09/13/2018    Pneumococcal med risk (1 of 1 - PPSV23) 11/16/2018 (Originally 12/25/1974)    Shingles Vaccine (1 of 2 - 2 Dose Series) 05/18/2019 (Originally 12/25/2005)    Diabetic foot exam  09/17/2019    Diabetic microalbuminuria test  09/17/2019    Lipid screen  09/17/2019    TSH testing  09/17/2019    Breast cancer screen  10/18/2019    Cervical cancer screen  11/17/2020    Flu vaccine  Completed    Hepatitis C screen  Completed    HIV screen  Completed       PHQ Scores 9/17/2018 11/17/2017 9/23/2016   PHQ2 Score 0 0 0   PHQ9 Score 0 0 0     Interpretation of Total Score Depression Severity: 1-4 = Minimal depression, 5-9 = Mild depression, 10-14 = Moderate depression, 15-19 = Moderately severe depression, 20-27 = Severe depression    Current Outpatient Prescriptions   Medication Sig Dispense Refill    colchicine (COLCRYS) 0.6 MG tablet Take  2 tablets now and may take one tablet in 1 hour if pain if not improving , then take 3 times a day x 7 days 24 tablet 3    clobetasol (TEMOVATE) 0.05 % ointment Apply a think layer topically 1 time daily. 60 g 1    montelukast (SINGULAIR) 10 MG tablet Take 1 tablet by mouth daily 30 tablet 5    SYNTHROID 150 MCG tablet Take 1 tablet by mouth every other day 15 tablet 5    pantoprazole (PROTONIX) 40 MG tablet Take 1 tablet by mouth daily 30 tablet 5    atorvastatin (LIPITOR) 20 MG tablet Take 1 tablet by mouth daily 30 tablet 5    SYNTHROID 175 MCG tablet Take 1 tablet by mouth every other day 15 tablet 5    metFORMIN (GLUCOPHAGE-XR) 500 MG extended release tablet Take 2 tablets by mouth daily (with breakfast) 60 tablet 5    metoprolol succinate (TOPROL XL) 50 MG extended release tablet Take 1 tablet by mouth daily (Patient taking differently: Take 50 mg by mouth 2 times daily ) 60 tablet 2    lisinopril-hydrochlorothiazide (PRINZIDE;ZESTORETIC) 10-12.5 MG per tablet Take 1 tablet by mouth daily (Patient taking differently: Take 2 tablets by mouth daily ) 30 tablet 5    ibuprofen (ADVIL;MOTRIN) 800 MG tablet Take 1 tablet by mouth every 8 hours as needed for Pain 90 tablet 3    glucose blood VI test strips ([employer] BLOOD GLUCOSE TEST) strip Test blood sugar once daily. Dx: Diabetes mellitus type 2 in obese 250.00, 278.00 50 each 11    aspirin 81 MG EC tablet Take 81 mg by mouth daily.  docusate sodium (COLACE) 100 MG capsule Take 100 mg by mouth daily. No current facility-administered medications for this visit. HPI:      Patient presents today for routine follow-up of multiple medical problems including diabetes mellitus type 2, hypertension, hyperlipidemia, hypothyroidism, GERD, gout, seasonal allergies and chronic cough. She does also have some vulvar irritation from atrophic vaginitis.   She for several hours per day. She has no other concerns at this time. cmw        Hyperlipidemia   This is a chronic problem. The current episode started more than 1 year ago. Recent lipid tests were reviewed and are variable. Exacerbating diseases include diabetes and hypothyroidism. She has no history of chronic renal disease, liver disease, obesity or nephrotic syndrome. There are no known factors aggravating her hyperlipidemia. Pertinent negatives include no chest pain, focal sensory loss, focal weakness, leg pain, myalgias or shortness of breath. Current antihyperlipidemic treatment includes statins and diet change. The current treatment provides mild improvement of lipids. There are no compliance problems. Risk factors for coronary artery disease include diabetes mellitus and hypertension. Hypertension   This is a chronic problem. The current episode started more than 1 year ago. The problem has been gradually improving since onset. Pertinent negatives include no anxiety, blurred vision, chest pain, headaches, malaise/fatigue, neck pain, orthopnea, palpitations, peripheral edema, PND, shortness of breath or sweats. Agents associated with hypertension include thyroid hormones. Risk factors for coronary artery disease include diabetes mellitus. The current treatment provides mild improvement. There are no compliance problems. There is no history of chronic renal disease. Diabetes   She presents for her follow-up diabetic visit. She has type 2 diabetes mellitus. No MedicAlert identification noted. Hypoglycemia symptoms include nervousness/anxiousness. Pertinent negatives for hypoglycemia include no dizziness, headaches or sweats. There are no diabetic associated symptoms. Pertinent negatives for diabetes include no blurred vision, no chest pain, no fatigue, no polydipsia, no polyphagia and no weight loss. There are no hypoglycemic complications. There are no diabetic complications.  Risk factors for coronary Patient voiced understanding. Quality Measures    Body mass index is 31.72 kg/m². Elevated. Weight control planned discussed Healthy diet and regular exercise. BP: 112/80 Blood pressure is normal. Treatment plan consists of No treatment change needed.     Lab Results   Component Value Date    LDLCALC 32 04/13/2015    LDLCHOLESTEROL 36 09/17/2018    (goal LDL reduction with dx if diabetes is 50% LDL reduction)      PHQ Scores 9/17/2018 11/17/2017 9/23/2016   PHQ2 Score 0 0 0   PHQ9 Score 0 0 0     Interpretation of Total Score Depression Severity: 1-4 = Minimal depression, 5-9 = Mild depression, 10-14 = Moderate depression, 15-19 = Moderately severe depression, 20-27 = Severe depression      Electronically signed by Gelacio Santacruz MD on 9/18/2018 at 4:35 PM

## 2018-09-18 PROBLEM — M1A.09X0 CHRONIC GOUT OF MULTIPLE SITES: Status: ACTIVE | Noted: 2018-09-18

## 2018-09-18 PROBLEM — E11.42 DIABETIC POLYNEUROPATHY ASSOCIATED WITH TYPE 2 DIABETES MELLITUS (HCC): Status: ACTIVE | Noted: 2018-09-18

## 2018-09-18 LAB
ESTIMATED AVERAGE GLUCOSE: 166 MG/DL
HBA1C MFR BLD: 7.4 % (ref 4–6)
URIC ACID: 8.6 MG/DL (ref 2.4–5.7)

## 2018-09-18 ASSESSMENT — ENCOUNTER SYMPTOMS
SINUS PAIN: 0
COUGH: 1
RHINORRHEA: 0

## 2018-09-20 DIAGNOSIS — M1A.09X0 CHRONIC GOUT OF MULTIPLE SITES, UNSPECIFIED CAUSE: Primary | ICD-10-CM

## 2018-09-20 DIAGNOSIS — I10 ESSENTIAL HYPERTENSION: Chronic | ICD-10-CM

## 2018-09-20 DIAGNOSIS — E11.69 DIABETES MELLITUS TYPE 2 IN OBESE (HCC): Chronic | ICD-10-CM

## 2018-09-20 DIAGNOSIS — E78.5 HYPERLIPIDEMIA, UNSPECIFIED HYPERLIPIDEMIA TYPE: ICD-10-CM

## 2018-09-20 DIAGNOSIS — E03.9 HYPOTHYROIDISM, UNSPECIFIED TYPE: ICD-10-CM

## 2018-09-20 DIAGNOSIS — E66.9 DIABETES MELLITUS TYPE 2 IN OBESE (HCC): Chronic | ICD-10-CM

## 2018-09-20 RX ORDER — COLCHICINE 0.6 MG/1
0.6 TABLET ORAL DAILY
Qty: 30 TABLET | Refills: 5 | Status: SHIPPED | OUTPATIENT
Start: 2018-09-20 | End: 2019-01-16 | Stop reason: SDUPTHER

## 2018-09-20 RX ORDER — METFORMIN HYDROCHLORIDE 500 MG/1
2000 TABLET, EXTENDED RELEASE ORAL
Qty: 120 TABLET | Refills: 5 | Status: SHIPPED | OUTPATIENT
Start: 2018-09-20 | End: 2019-09-03 | Stop reason: SDUPTHER

## 2018-10-01 DIAGNOSIS — E03.9 HYPOTHYROIDISM, UNSPECIFIED TYPE: Primary | ICD-10-CM

## 2018-10-01 NOTE — TELEPHONE ENCOUNTER
appointments.          Patient Active Problem List:     Hypertension     Type 2 diabetes mellitus with diabetic polyneuropathy, without long-term current use of insulin (HCC)     Hypothyroidism     Hyperlipidemia     Gastroesophageal reflux disease     Diabetic polyneuropathy associated with type 2 diabetes mellitus (HCC)     Chronic gout of multiple sites

## 2018-10-02 RX ORDER — LEVOTHYROXINE SODIUM 175 MCG
175 TABLET ORAL EVERY OTHER DAY
Qty: 15 TABLET | Refills: 5 | Status: SHIPPED | OUTPATIENT
Start: 2018-10-02 | End: 2019-01-21 | Stop reason: DRUGHIGH

## 2018-10-10 ENCOUNTER — OFFICE VISIT (OUTPATIENT)
Dept: FAMILY MEDICINE CLINIC | Age: 63
End: 2018-10-10
Payer: COMMERCIAL

## 2018-10-10 VITALS
SYSTOLIC BLOOD PRESSURE: 152 MMHG | HEIGHT: 63 IN | HEART RATE: 76 BPM | WEIGHT: 178.6 LBS | OXYGEN SATURATION: 94 % | DIASTOLIC BLOOD PRESSURE: 94 MMHG | BODY MASS INDEX: 31.64 KG/M2

## 2018-10-10 DIAGNOSIS — R42 LIGHT HEADEDNESS: ICD-10-CM

## 2018-10-10 DIAGNOSIS — I10 ESSENTIAL HYPERTENSION: Primary | ICD-10-CM

## 2018-10-10 DIAGNOSIS — R68.84 JAW PAIN: ICD-10-CM

## 2018-10-10 DIAGNOSIS — R05.9 COUGH: ICD-10-CM

## 2018-10-10 PROCEDURE — 99214 OFFICE O/P EST MOD 30 MIN: CPT | Performed by: PEDIATRICS

## 2018-10-10 PROCEDURE — 93000 ELECTROCARDIOGRAM COMPLETE: CPT | Performed by: PEDIATRICS

## 2018-10-10 RX ORDER — LOSARTAN POTASSIUM AND HYDROCHLOROTHIAZIDE 25; 100 MG/1; MG/1
1 TABLET ORAL DAILY
Qty: 30 TABLET | Refills: 2 | Status: SHIPPED | OUTPATIENT
Start: 2018-10-10 | End: 2018-12-27 | Stop reason: SDUPTHER

## 2018-10-10 ASSESSMENT — ENCOUNTER SYMPTOMS
COUGH: 1
EYE PAIN: 0
EYE DISCHARGE: 0
EYE REDNESS: 0
ABDOMINAL PAIN: 0
SHORTNESS OF BREATH: 0
STRIDOR: 0
PHOTOPHOBIA: 0
CONSTIPATION: 0
WHEEZING: 0
DIARRHEA: 0
CHOKING: 0

## 2018-10-10 NOTE — PROGRESS NOTES
days ago she was at work she felt lightheaded and did have some jaw pain bilaterally. She states that her ears were ringing and she did have a headache. Her blood pressure was somewhat elevated at that time. She has been taking her Toprol 1 tablet twice daily and her lisinopril 2 tabs once daily. She states that about a month ago she did try to wean off of her blood pressure medications because her blood pressures have been good. When she did try to wean off of these her blood pressure elevated so she restarted her medications and has been taking them regularly for the last 3 weeks. She did have cold symptoms last week including nasal congestion and cough but these are getting better. She has not had any fevers. She did take some plain Mucinex over-the-counter and used some Vicks VapoRub. She does continue with a cough. She has been eating some high salty foods over the last week. There've been no other changes in her regular schedule. cmw      Review of Systems   Constitutional: Positive for fatigue. Negative for appetite change and fever. HENT: Positive for congestion. Jaw pain   Eyes: Negative for photophobia, pain, discharge and redness. Respiratory: Positive for cough. Negative for choking, shortness of breath, wheezing and stridor. Cardiovascular: Negative for chest pain, palpitations and leg swelling. Gastrointestinal: Negative for abdominal pain, constipation and diarrhea. Endocrine: Negative for polydipsia and polyphagia. Allergic/Immunologic: Negative for immunocompromised state. Neurological: Positive for light-headedness. Negative for dizziness and headaches. Hematological: Negative for adenopathy. Does not bruise/bleed easily. Objective:   Physical Exam   Constitutional: She is oriented to person, place, and time. She appears well-developed and well-nourished. No distress. obese   HENT:   Head: Normocephalic.    Right Ear: External ear normal.   Left Ear:

## 2018-10-29 ENCOUNTER — TELEPHONE (OUTPATIENT)
Dept: FAMILY MEDICINE CLINIC | Age: 63
End: 2018-10-29

## 2018-11-16 ENCOUNTER — OFFICE VISIT (OUTPATIENT)
Dept: FAMILY MEDICINE CLINIC | Age: 63
End: 2018-11-16
Payer: COMMERCIAL

## 2018-11-16 VITALS
HEART RATE: 83 BPM | TEMPERATURE: 99.3 F | WEIGHT: 179 LBS | OXYGEN SATURATION: 98 % | DIASTOLIC BLOOD PRESSURE: 82 MMHG | SYSTOLIC BLOOD PRESSURE: 118 MMHG | BODY MASS INDEX: 31.72 KG/M2

## 2018-11-16 DIAGNOSIS — R09.81 NASAL CONGESTION: ICD-10-CM

## 2018-11-16 DIAGNOSIS — B34.9 VIRAL ILLNESS: Primary | ICD-10-CM

## 2018-11-16 PROCEDURE — 99213 OFFICE O/P EST LOW 20 MIN: CPT | Performed by: NURSE PRACTITIONER

## 2018-11-16 RX ORDER — FEXOFENADINE HCL 180 MG/1
180 TABLET ORAL DAILY
Qty: 30 TABLET | Refills: 1 | Status: SHIPPED | OUTPATIENT
Start: 2018-11-16 | End: 2019-01-15

## 2018-11-16 RX ORDER — FLUTICASONE PROPIONATE 50 MCG
2 SPRAY, SUSPENSION (ML) NASAL DAILY
Qty: 1 BOTTLE | Refills: 1 | Status: SHIPPED | OUTPATIENT
Start: 2018-11-16 | End: 2019-11-11 | Stop reason: ALTCHOICE

## 2018-11-16 ASSESSMENT — ENCOUNTER SYMPTOMS
RHINORRHEA: 0
CONSTIPATION: 0
SORE THROAT: 1
DIARRHEA: 1
TROUBLE SWALLOWING: 1
VOMITING: 0
COUGH: 1
ABDOMINAL PAIN: 0
SHORTNESS OF BREATH: 1
CHEST TIGHTNESS: 1

## 2018-12-01 DIAGNOSIS — E66.9 DIABETES MELLITUS TYPE 2 IN OBESE (HCC): Chronic | ICD-10-CM

## 2018-12-01 DIAGNOSIS — E11.69 DIABETES MELLITUS TYPE 2 IN OBESE (HCC): Chronic | ICD-10-CM

## 2018-12-03 RX ORDER — METFORMIN HYDROCHLORIDE 500 MG/1
TABLET, EXTENDED RELEASE ORAL
Qty: 60 TABLET | Refills: 4 | OUTPATIENT
Start: 2018-12-03

## 2018-12-27 DIAGNOSIS — E78.5 HYPERLIPIDEMIA, UNSPECIFIED HYPERLIPIDEMIA TYPE: ICD-10-CM

## 2018-12-27 DIAGNOSIS — K21.9 GASTROESOPHAGEAL REFLUX DISEASE, ESOPHAGITIS PRESENCE NOT SPECIFIED: Primary | ICD-10-CM

## 2018-12-27 DIAGNOSIS — I10 ESSENTIAL HYPERTENSION: ICD-10-CM

## 2018-12-28 RX ORDER — ATORVASTATIN CALCIUM 20 MG/1
20 TABLET, FILM COATED ORAL DAILY
Qty: 30 TABLET | Refills: 5 | Status: SHIPPED | OUTPATIENT
Start: 2018-12-28 | End: 2019-07-03 | Stop reason: SDUPTHER

## 2018-12-28 RX ORDER — PANTOPRAZOLE SODIUM 40 MG/1
40 TABLET, DELAYED RELEASE ORAL DAILY
Qty: 30 TABLET | Refills: 5 | Status: SHIPPED | OUTPATIENT
Start: 2018-12-28 | End: 2019-07-03 | Stop reason: SDUPTHER

## 2018-12-28 RX ORDER — LOSARTAN POTASSIUM AND HYDROCHLOROTHIAZIDE 25; 100 MG/1; MG/1
1 TABLET ORAL DAILY
Qty: 30 TABLET | Refills: 5 | Status: SHIPPED | OUTPATIENT
Start: 2018-12-28 | End: 2019-07-03 | Stop reason: SDUPTHER

## 2019-01-15 ENCOUNTER — TELEPHONE (OUTPATIENT)
Dept: FAMILY MEDICINE CLINIC | Age: 64
End: 2019-01-15

## 2019-01-16 ENCOUNTER — OFFICE VISIT (OUTPATIENT)
Dept: FAMILY MEDICINE CLINIC | Age: 64
End: 2019-01-16
Payer: COMMERCIAL

## 2019-01-16 VITALS
DIASTOLIC BLOOD PRESSURE: 78 MMHG | WEIGHT: 181 LBS | RESPIRATION RATE: 20 BRPM | BODY MASS INDEX: 32.07 KG/M2 | SYSTOLIC BLOOD PRESSURE: 126 MMHG | HEART RATE: 84 BPM

## 2019-01-16 DIAGNOSIS — F41.9 ANXIETY: ICD-10-CM

## 2019-01-16 DIAGNOSIS — I10 ESSENTIAL HYPERTENSION: Primary | ICD-10-CM

## 2019-01-16 DIAGNOSIS — M25.562 ACUTE PAIN OF LEFT KNEE: ICD-10-CM

## 2019-01-16 DIAGNOSIS — R05.9 COUGH: ICD-10-CM

## 2019-01-16 DIAGNOSIS — R07.9 CHEST PAIN, UNSPECIFIED TYPE: ICD-10-CM

## 2019-01-16 DIAGNOSIS — R06.02 SHORTNESS OF BREATH: ICD-10-CM

## 2019-01-16 DIAGNOSIS — K21.9 GASTROESOPHAGEAL REFLUX DISEASE, ESOPHAGITIS PRESENCE NOT SPECIFIED: ICD-10-CM

## 2019-01-16 DIAGNOSIS — R51.9 NONINTRACTABLE HEADACHE, UNSPECIFIED CHRONICITY PATTERN, UNSPECIFIED HEADACHE TYPE: ICD-10-CM

## 2019-01-16 DIAGNOSIS — E78.5 HYPERLIPIDEMIA, UNSPECIFIED HYPERLIPIDEMIA TYPE: ICD-10-CM

## 2019-01-16 PROCEDURE — 99214 OFFICE O/P EST MOD 30 MIN: CPT | Performed by: PEDIATRICS

## 2019-01-16 RX ORDER — LORAZEPAM 0.5 MG/1
0.5 TABLET ORAL EVERY 8 HOURS PRN
Qty: 30 TABLET | Refills: 0 | Status: SHIPPED | OUTPATIENT
Start: 2019-01-16 | End: 2021-01-29 | Stop reason: SDUPTHER

## 2019-01-16 RX ORDER — AMLODIPINE BESYLATE 10 MG/1
10 TABLET ORAL DAILY
Qty: 30 TABLET | Refills: 2 | Status: SHIPPED | OUTPATIENT
Start: 2019-01-16 | End: 2019-04-29 | Stop reason: SINTOL

## 2019-01-16 ASSESSMENT — ENCOUNTER SYMPTOMS
CONSTIPATION: 0
STRIDOR: 0
NAUSEA: 0
ABDOMINAL PAIN: 0
ORTHOPNEA: 0
EYE DISCHARGE: 0
SHORTNESS OF BREATH: 1
EYE REDNESS: 0
PHOTOPHOBIA: 0
WHEEZING: 0
EYE PAIN: 0
DIARRHEA: 0
CHOKING: 0
VOMITING: 0
BLOOD IN STOOL: 0
BLURRED VISION: 0

## 2019-01-18 LAB
ALBUMIN: 3.7 G/DL (ref 3.5–5)
ALP BLD-CCNC: 71 U/L (ref 45–117)
ALT SERPL-CCNC: 36 U/L (ref 12–78)
AST SERPL-CCNC: 27 U/L (ref 15–37)
B-TYPE NATRIURETIC PEPTIDE: 77 PG/ML (ref 5–100)
BILIRUB SERPL-MCNC: 0.5 MG/DL (ref 0–1)
BUN BLDV-MCNC: 14 MG/DL (ref 7–18)
CALCIUM SERPL-MCNC: 9.1 MG/DL (ref 8.5–10.1)
CHLORIDE BLD-SCNC: 104 MMOL/L (ref 97–107)
CHOLESTEROL/HDL RELATIVE RISK: 2.87
CHOLESTEROL: 112 MG/DL (ref 100–200)
CO2: 28 MMOL/L (ref 21–32)
CREAT SERPL-MCNC: 1.09 MG/DL (ref 0.55–1.02)
ESTIMATED AVERAGE GLUCOSE: 169 MG/DL
GFR CALCULATED: 54
GLUCOSE: 106 MG/DL (ref 70–99)
HBA1C MFR BLD: 7.5 % (ref 4–5.6)
HDLC SERPL-MCNC: 39 MG/DL (ref 45–60)
LDL CHOLESTEROL: 34 MG/DL (ref 88–198)
POTASSIUM SERPL-SCNC: 4 MMOL/L (ref 3.5–5.1)
SODIUM BLD-SCNC: 141 MMOL/L (ref 136–145)
T4 FREE: 1.64 NG/DL (ref 0.59–1.17)
TOTAL PROTEIN: 7.3 G/DL (ref 6.4–8.2)
TRIGL SERPL-MCNC: 196 MG/DL
TSH SERPL DL<=0.05 MIU/L-ACNC: 0.92 UIU/ML (ref 0.36–3.74)
URIC ACID: 7.5 MG/DL (ref 2.6–6)
VLDLC SERPL CALC-MCNC: 39 MG/DL (ref 5–35)

## 2019-01-20 ASSESSMENT — ENCOUNTER SYMPTOMS
VOICE CHANGE: 0
RHINORRHEA: 0
COUGH: 1
COLOR CHANGE: 0
SINUS PAIN: 0

## 2019-01-21 DIAGNOSIS — E03.9 HYPOTHYROIDISM, UNSPECIFIED TYPE: ICD-10-CM

## 2019-01-21 DIAGNOSIS — M1A.09X0 CHRONIC GOUT OF MULTIPLE SITES, UNSPECIFIED CAUSE: Primary | ICD-10-CM

## 2019-01-21 DIAGNOSIS — E11.42 TYPE 2 DIABETES MELLITUS WITH DIABETIC POLYNEUROPATHY, WITHOUT LONG-TERM CURRENT USE OF INSULIN (HCC): Chronic | ICD-10-CM

## 2019-01-21 RX ORDER — LEVOTHYROXINE SODIUM 150 MCG
150 TABLET ORAL DAILY
Qty: 30 TABLET | Refills: 3 | Status: SHIPPED | OUTPATIENT
Start: 2019-01-21 | End: 2019-05-30 | Stop reason: SDUPTHER

## 2019-01-21 RX ORDER — ALLOPURINOL 100 MG/1
100 TABLET ORAL DAILY
Qty: 30 TABLET | Refills: 5 | Status: SHIPPED | OUTPATIENT
Start: 2019-01-21 | End: 2021-01-29

## 2019-04-15 ENCOUNTER — TELEPHONE (OUTPATIENT)
Dept: FAMILY MEDICINE CLINIC | Age: 64
End: 2019-04-15

## 2019-04-15 DIAGNOSIS — Z12.39 BREAST CANCER SCREENING: Primary | ICD-10-CM

## 2019-04-15 NOTE — TELEPHONE ENCOUNTER
Patient called and would like a Mammogram order faxed over to [facility] she has an appointment on 4/24/2019 to have done .  Thank You

## 2019-04-24 ENCOUNTER — HOSPITAL ENCOUNTER (OUTPATIENT)
Age: 64
Setting detail: SPECIMEN
Discharge: HOME | End: 2019-04-24
Payer: COMMERCIAL

## 2019-04-24 DIAGNOSIS — M1A.09X0 CHRONIC GOUT OF MULTIPLE SITES, UNSPECIFIED CAUSE: ICD-10-CM

## 2019-04-24 DIAGNOSIS — E03.9 HYPOTHYROIDISM, UNSPECIFIED TYPE: ICD-10-CM

## 2019-04-24 DIAGNOSIS — E11.42 TYPE 2 DIABETES MELLITUS WITH DIABETIC POLYNEUROPATHY, WITHOUT LONG-TERM CURRENT USE OF INSULIN (HCC): Chronic | ICD-10-CM

## 2019-04-24 LAB
ALBUMIN SERPL-MCNC: 4.4 G/DL (ref 3.5–5.2)
ALBUMIN/GLOBULIN RATIO: 1.9 (ref 1–2.5)
ALP BLD-CCNC: 65 U/L (ref 35–104)
ALT SERPL-CCNC: 42 U/L (ref 5–33)
ANION GAP SERPL CALCULATED.3IONS-SCNC: 16 MMOL/L (ref 9–17)
AST SERPL-CCNC: 40 U/L
BILIRUB SERPL-MCNC: 0.45 MG/DL (ref 0.3–1.2)
BUN BLDV-MCNC: 17 MG/DL (ref 8–23)
BUN/CREAT BLD: ABNORMAL (ref 9–20)
CALCIUM SERPL-MCNC: 10.1 MG/DL (ref 8.6–10.4)
CHLORIDE BLD-SCNC: 101 MMOL/L (ref 98–107)
CO2: 26 MMOL/L (ref 20–31)
CREAT SERPL-MCNC: 0.94 MG/DL (ref 0.5–0.9)
GFR AFRICAN AMERICAN: >60 ML/MIN
GFR NON-AFRICAN AMERICAN: >60 ML/MIN
GFR SERPL CREATININE-BSD FRML MDRD: ABNORMAL ML/MIN/{1.73_M2}
GFR SERPL CREATININE-BSD FRML MDRD: ABNORMAL ML/MIN/{1.73_M2}
GLUCOSE BLD-MCNC: 112 MG/DL (ref 70–99)
POTASSIUM SERPL-SCNC: 3.9 MMOL/L (ref 3.7–5.3)
SODIUM BLD-SCNC: 143 MMOL/L (ref 135–144)
THYROXINE, FREE: 1.75 NG/DL (ref 0.93–1.7)
TOTAL PROTEIN: 6.7 G/DL (ref 6.4–8.3)
TSH SERPL DL<=0.05 MIU/L-ACNC: 1.3 MIU/L (ref 0.3–5)
URIC ACID: 8.3 MG/DL (ref 2.4–5.7)

## 2019-04-25 DIAGNOSIS — Z12.39 BREAST CANCER SCREENING: ICD-10-CM

## 2019-04-25 LAB
ESTIMATED AVERAGE GLUCOSE: 154 MG/DL
HBA1C MFR BLD: 7 % (ref 4–6)

## 2019-04-29 ENCOUNTER — OFFICE VISIT (OUTPATIENT)
Dept: FAMILY MEDICINE CLINIC | Age: 64
End: 2019-04-29
Payer: COMMERCIAL

## 2019-04-29 VITALS
TEMPERATURE: 97.6 F | HEART RATE: 70 BPM | SYSTOLIC BLOOD PRESSURE: 121 MMHG | DIASTOLIC BLOOD PRESSURE: 80 MMHG | BODY MASS INDEX: 33.13 KG/M2 | RESPIRATION RATE: 20 BRPM | WEIGHT: 187 LBS

## 2019-04-29 DIAGNOSIS — E78.5 HYPERLIPIDEMIA, UNSPECIFIED HYPERLIPIDEMIA TYPE: ICD-10-CM

## 2019-04-29 DIAGNOSIS — I10 ESSENTIAL HYPERTENSION: ICD-10-CM

## 2019-04-29 DIAGNOSIS — K21.9 GASTROESOPHAGEAL REFLUX DISEASE, ESOPHAGITIS PRESENCE NOT SPECIFIED: ICD-10-CM

## 2019-04-29 DIAGNOSIS — R05.9 COUGH: Primary | ICD-10-CM

## 2019-04-29 DIAGNOSIS — Z12.11 SCREEN FOR COLON CANCER: ICD-10-CM

## 2019-04-29 DIAGNOSIS — E11.42 TYPE 2 DIABETES MELLITUS WITH DIABETIC POLYNEUROPATHY, WITHOUT LONG-TERM CURRENT USE OF INSULIN (HCC): ICD-10-CM

## 2019-04-29 DIAGNOSIS — E03.9 HYPOTHYROIDISM, UNSPECIFIED TYPE: ICD-10-CM

## 2019-04-29 DIAGNOSIS — M25.551 RIGHT HIP PAIN: ICD-10-CM

## 2019-04-29 DIAGNOSIS — M1A.09X0 CHRONIC GOUT OF MULTIPLE SITES, UNSPECIFIED CAUSE: ICD-10-CM

## 2019-04-29 DIAGNOSIS — R06.02 SHORTNESS OF BREATH: ICD-10-CM

## 2019-04-29 LAB
CONTROL: NORMAL
HEMOCCULT STL QL: NORMAL

## 2019-04-29 PROCEDURE — 99214 OFFICE O/P EST MOD 30 MIN: CPT | Performed by: PEDIATRICS

## 2019-04-29 PROCEDURE — 82274 ASSAY TEST FOR BLOOD FECAL: CPT | Performed by: PEDIATRICS

## 2019-04-29 RX ORDER — CARVEDILOL 25 MG/1
25 TABLET ORAL 2 TIMES DAILY
COMMUNITY
End: 2022-04-20 | Stop reason: SDUPTHER

## 2019-04-29 ASSESSMENT — ENCOUNTER SYMPTOMS
CHEST TIGHTNESS: 0
HEMOPTYSIS: 0
RHINORRHEA: 0
SORE THROAT: 0
HEARTBURN: 0
COUGH: 1
WHEEZING: 0

## 2019-04-29 ASSESSMENT — PATIENT HEALTH QUESTIONNAIRE - PHQ9
SUM OF ALL RESPONSES TO PHQ QUESTIONS 1-9: 0
1. LITTLE INTEREST OR PLEASURE IN DOING THINGS: 0
SUM OF ALL RESPONSES TO PHQ QUESTIONS 1-9: 0
SUM OF ALL RESPONSES TO PHQ9 QUESTIONS 1 & 2: 0
2. FEELING DOWN, DEPRESSED OR HOPELESS: 0

## 2019-04-29 NOTE — PROGRESS NOTES
Subjective:      Patient ID: Beena Amador is a 61 y.o. female. Visit Information    Have you changed or started any medications since your last visit including any over-the-counter medicines, vitamins, or herbal medicines? no   Are you having any side effects from any of your medications? -  no  Have you stopped taking any of your medications? Is so, why? -  no    Have you seen any other physician or provider since your last visit? No  Have you had any other diagnostic tests since your last visit? No  Have you been seen in the emergency room and/or had an admission to a hospital since we last saw you? No  Have you had your routine dental cleaning in the past 6 months? no    Have you activated your [employer] account? If not, what are your barriers?  Yes     Patient Care Team:  Tim James MD as PCP - General (Pediatrics)    Medical History Review  Past Medical, Family, and Social History reviewed and does contribute to the patient presenting condition    Health Maintenance   Topic Date Due    Pneumococcal 0-64 years Vaccine (1 of 1 - PPSV23) 12/25/1961    Shingles Vaccine (1 of 2) 05/18/2019 (Originally 12/25/2005)    Diabetic foot exam  09/17/2019    Diabetic microalbuminuria test  09/17/2019    Lipid screen  01/18/2020    Diabetic retinal exam  02/06/2020    A1C test (Diabetic or Prediabetic)  04/24/2020    TSH testing  04/24/2020    Potassium monitoring  04/24/2020    Creatinine monitoring  04/24/2020    Colon Cancer Screen FIT/FOBT  04/29/2020    Cervical cancer screen  11/17/2020    Breast cancer screen  04/24/2021    Flu vaccine  Completed    Hepatitis C screen  Completed    HIV screen  Completed       PHQ Scores 4/29/2019 9/17/2018 11/17/2017 9/23/2016   PHQ2 Score 0 0 0 0   PHQ9 Score 0 0 0 0     Interpretation of Total Score DepressionSeverity: 1-4 = Minimal depression, 5-9 = Mild depression, 10-14 = Moderate depression, 15-19 = Moderately severe depression, 20-27 = Severe depression    Current Outpatient Medications   Medication Sig Dispense Refill    carvedilol (COREG) 25 MG tablet Take 25 mg by mouth 2 times daily      empagliflozin (JARDIANCE) 10 MG tablet Take 1 tablet by mouth daily 30 tablet 5    allopurinol (ZYLOPRIM) 100 MG tablet Take 1 tablet by mouth daily 30 tablet 5    losartan-hydrochlorothiazide (HYZAAR) 100-25 MG per tablet Take 1 tablet by mouth daily 30 tablet 5    pantoprazole (PROTONIX) 40 MG tablet Take 1 tablet by mouth daily 30 tablet 5    atorvastatin (LIPITOR) 20 MG tablet Take 1 tablet by mouth daily 30 tablet 5    fluticasone (FLONASE) 50 MCG/ACT nasal spray 2 sprays by Nasal route daily 1 Bottle 1    metFORMIN (GLUCOPHAGE-XR) 500 MG extended release tablet Take 4 tablets by mouth daily (with breakfast) (Patient taking differently: Take 1,000 mg by mouth daily (with breakfast) ) 120 tablet 5    clobetasol (TEMOVATE) 0.05 % ointment Apply a think layer topically 1 time daily. 60 g 1    montelukast (SINGULAIR) 10 MG tablet Take 1 tablet by mouth daily 30 tablet 5    aspirin 81 MG EC tablet Take 81 mg by mouth daily.  docusate sodium (COLACE) 100 MG capsule Take 100 mg by mouth daily.  SYNTHROID 150 MCG tablet Take 1 tablet by mouth Daily 30 tablet 3    glucose blood VI test strips ([employer] BLOOD GLUCOSE TEST) strip Test blood sugar once daily. Dx: Diabetes mellitus type 2 in obese 250.00, 278.00 50 each 11     No current facility-administered medications for this visit. HPI:      Patient presents today for evaluation of several concerns. She complains of a persistent cough for over the last year. She states that this is a loose cough with a lot of mucus production. She is coughing up white phlegm. She does feel somewhat short of breath after she is coughing. She states that the cough does not wake her up at night.   She did have a chest x-ray done approximately 1 year ago that was normal.  She has been treated with antibiotics and steroids in the past without improvement. She has never seen pulmonology and has never been diagnosed with asthma or COPD. She does have a history of GERD and does continue to take Protonix daily. She will occasionally take Tums for some heartburn but denies any significant persistent symptoms. She does have some increased belching. She also complains of right hip pain that she has had some the last 2 weeks. She states this is pain deep in the right buttock area. She states that she is not able to sit for long period of time. She states this does seem better when she is up and moving around. She states that walking down steps does make it hurt a little more. She did have blood work done several days ago for her history of type 2 diabetes with polyneuropathy, hypertension, hyperlipidemia, hypothyroidism and gout. These labs were reviewed with her. She states that she did follow-up with her cardiologist regarding her high blood pressure. He did discontinue her metoprolol and amlodipine and started her on carvedilol 25 mg twice daily. She states that this has improved her blood pressure. She did bring in her FIT test today. cmw    Cough   This is a chronic problem. The current episode started more than 1 year ago. The problem has been unchanged. The problem occurs every few minutes. The cough is productive of sputum. Associated symptoms include shortness of breath. Pertinent negatives include no chest pain, chills, ear congestion, ear pain, eye redness, fever, headaches, heartburn, hemoptysis, myalgias, nasal congestion, postnasal drip, rash, rhinorrhea, sore throat, sweats, weight loss or wheezing. Nothing aggravates the symptoms. She has tried OTC cough suppressant and rest for the symptoms. The treatment provided no relief. Hip Pain    The incident occurred more than 1 week ago. There was no injury mechanism. The pain is present in the right hip.  The quality of the pain is time. She appears well-developed and well-nourished. No distress. HENT:   Head: Normocephalic. Right Ear: External ear normal.   Left Ear: External ear normal.   Mouth/Throat: Oropharynx is clear and moist. No oropharyngeal exudate. Eyes: Pupils are equal, round, and reactive to light. Conjunctivae and EOM are normal. Right eye exhibits no discharge. Left eye exhibits no discharge. Neck: Normal range of motion. Neck supple. Cardiovascular: Normal rate, regular rhythm and normal heart sounds. Exam reveals no friction rub. No murmur heard. Pulmonary/Chest: Effort normal. No accessory muscle usage or stridor. No respiratory distress. She has no decreased breath sounds. She has no wheezes. She has rhonchi in the left upper field and the left middle field. She has no rales. Abdominal: Bowel sounds are normal.   Musculoskeletal: She exhibits no edema. Right hip: She exhibits decreased range of motion, decreased strength and tenderness. Legs:  Neurological: She is alert and oriented to person, place, and time. Skin: She is not diaphoretic. No erythema. Nursing note and vitals reviewed. Assessment:      Diagnosis Orders   1. Cough  XR CHEST STANDARD (2 VW)    74797 - [place] BREATHING CAPACITY TEST   2. Shortness of breath     3. Gastroesophageal reflux disease, esophagitis presence not specified     4. Right hip pain  XR HIP RIGHT (2-3 VIEWS)   5. Type 2 diabetes mellitus with diabetic polyneuropathy, without long-term current use of insulin ([facility] [address]     6. Essential hypertension     7. Hyperlipidemia, unspecified hyperlipidemia type     8. Hypothyroidism, unspecified type     9. Chronic gout of multiple sites, unspecified cause     10.  Screen for colon cancer  POCT Fecal Immunochemical Test (FIT)       Plan:     Proceed with obtain chest xray / pulmonary function tests  Referral to pulmonary - patient will find out who is covered under insurance  Consider CT scan of the chest after review of CXR  Continue protonix daily and use TUMS as needed  Obtain right hip xray  Recommend physical therapy consultation and evaluation   Recommend ice / heat to right hip / buttock area   Stretching exercises recommended   Consider ortho referral after review of xray   Reviewed recent labs   Continue current medications   Take allopurinol regularly  FIT test brought in today and completed - negative - patient informed of results  Call with concerns    Shruti Madrid received counseling on the following healthy behaviors: nutrition, exercise and medication adherence  Reviewed prior labs and health maintenance. Continue current medications, diet and exercise. Discussed use, benefit, and side effects of prescribed medications. Barriers to medication compliance addressed. Patient given educational materials - see patient instructions. All patient questions answered. Patient voiced understanding.        Electronically signed by Kaylen Arriola MD on 4/30/2019 at 10:22 PM

## 2019-04-30 DIAGNOSIS — R05.9 COUGH: Primary | ICD-10-CM

## 2019-04-30 DIAGNOSIS — R06.02 SHORTNESS OF BREATH: ICD-10-CM

## 2019-04-30 ASSESSMENT — ENCOUNTER SYMPTOMS
EYE PAIN: 0
ABDOMINAL PAIN: 0
DIARRHEA: 0
EYE REDNESS: 0
CONSTIPATION: 0
COLOR CHANGE: 0
SHORTNESS OF BREATH: 1
VOMITING: 0
EYE DISCHARGE: 0

## 2019-05-01 DIAGNOSIS — E03.9 HYPOTHYROIDISM, UNSPECIFIED TYPE: ICD-10-CM

## 2019-05-01 DIAGNOSIS — E78.5 HYPERLIPIDEMIA, UNSPECIFIED HYPERLIPIDEMIA TYPE: ICD-10-CM

## 2019-05-01 DIAGNOSIS — E11.42 TYPE 2 DIABETES MELLITUS WITH DIABETIC POLYNEUROPATHY, WITHOUT LONG-TERM CURRENT USE OF INSULIN (HCC): Primary | ICD-10-CM

## 2019-05-01 DIAGNOSIS — M1A.09X0 CHRONIC GOUT OF MULTIPLE SITES, UNSPECIFIED CAUSE: ICD-10-CM

## 2019-05-01 DIAGNOSIS — I10 ESSENTIAL HYPERTENSION: ICD-10-CM

## 2019-05-17 ENCOUNTER — TELEPHONE (OUTPATIENT)
Dept: FAMILY MEDICINE CLINIC | Age: 64
End: 2019-05-17

## 2019-05-17 NOTE — TELEPHONE ENCOUNTER
Writer made patient aware that her CT of chest was denied by her insurance and that I have submitted a appeal. This can take up to 7 days for a decision to be made once it is writer will notify patient. m

## 2019-05-30 DIAGNOSIS — E03.9 HYPOTHYROIDISM, UNSPECIFIED TYPE: ICD-10-CM

## 2019-05-30 RX ORDER — LEVOTHYROXINE SODIUM 150 MCG
150 TABLET ORAL DAILY
Qty: 30 TABLET | Refills: 5 | Status: SHIPPED | OUTPATIENT
Start: 2019-05-30 | End: 2019-12-05 | Stop reason: SDUPTHER

## 2019-05-30 NOTE — TELEPHONE ENCOUNTER
LRF 1-21-19 # 30 RF 5  LOV 4-24-19  RTO return for routine follow up    Health Maintenance   Topic Date Due    Pneumococcal 0-64 years Vaccine (1 of 1 - PPSV23) 12/25/1961    Shingles Vaccine (1 of 2) 12/25/2005    Diabetic foot exam  09/17/2019    Diabetic microalbuminuria test  09/17/2019    Lipid screen  01/18/2020    Diabetic retinal exam  02/06/2020    A1C test (Diabetic or Prediabetic)  04/24/2020    TSH testing  04/24/2020    Potassium monitoring  04/24/2020    Creatinine monitoring  04/24/2020    Colon Cancer Screen FIT/FOBT  04/29/2020    Cervical cancer screen  11/17/2020    Breast cancer screen  04/24/2021    Flu vaccine  Completed    Hepatitis C screen  Completed    HIV screen  Completed             (applicable per patient's age: Cancer Screenings, Depression Screening, Fall Risk Screening, Immunizations)    Hemoglobin A1C (%)   Date Value   04/24/2019 7.0 (H)   01/18/2019 7.5 (H)   09/17/2018 7.4 (H)     Microalb/Crt.  Ratio (mcg/mg creat)   Date Value   09/17/2018 CANNOT BE CALCULATED     LDL Cholesterol (mg/dL)   Date Value   01/18/2019 34 (L)     LDL Calculated (mg/dL)   Date Value   04/13/2015 32     AST (U/L)   Date Value   04/24/2019 40 (H)     ALT (U/L)   Date Value   04/24/2019 42 (H)     BUN (mg/dL)   Date Value   04/24/2019 17      (goal A1C is < 7)   (goal LDL is <100) need 30-50% reduction from baseline     BP Readings from Last 3 Encounters:   04/29/19 121/80   01/16/19 126/78   11/16/18 118/82    (goal /80)      All Future Testing planned in [facility]:  Lab Frequency Next Occurrence   XR ANKLE RIGHT (MIN 3 VIEWS) Once 09/17/2018   Uric Acid Once 09/17/2018   T4, Free Once 09/17/2018   Comprehensive Metabolic Panel Once 48/50/0099   Lipid Panel Once 12/19/2018   T4, Free Once 12/19/2018   Hemoglobin A1C Once 12/19/2018   TSH without Reflex Once 12/19/2018   Uric Acid Once 12/19/2018   (Gxt) Stress Test Exercise W Out Myoview Once 10/10/2018   Brain Natriuretic Peptide Once 01/16/2019   XR CHEST STANDARD (2 VW) Once 04/29/2019   74362 - [place] BREATHING CAPACITY TEST Once 04/30/2019   XR HIP RIGHT (2-3 VIEWS) Once 04/29/2019   CT CHEST W CONTRAST Once 05/01/2019   Lipid Panel Once 11/01/2019   Comprehensive Metabolic Panel Once 06/59/5364   Hemoglobin A1C Once 11/01/2019   CBC Once 11/01/2019   Uric Acid Once 11/01/2019   T4, Free Once 11/01/2019   TSH without Reflex Once 11/01/2019       Next Visit Date:  No future appointments.          Patient Active Problem List:     Hypertension     Type 2 diabetes mellitus with diabetic polyneuropathy, without long-term current use of insulin (HCC)     Hypothyroidism     Hyperlipidemia     Gastroesophageal reflux disease     Diabetic polyneuropathy associated with type 2 diabetes mellitus (HCC)     Chronic gout of multiple sites

## 2019-07-01 DIAGNOSIS — E78.5 HYPERLIPIDEMIA, UNSPECIFIED HYPERLIPIDEMIA TYPE: ICD-10-CM

## 2019-07-01 DIAGNOSIS — I10 ESSENTIAL HYPERTENSION: ICD-10-CM

## 2019-07-03 DIAGNOSIS — K21.9 GASTROESOPHAGEAL REFLUX DISEASE, ESOPHAGITIS PRESENCE NOT SPECIFIED: ICD-10-CM

## 2019-07-03 DIAGNOSIS — E78.5 HYPERLIPIDEMIA, UNSPECIFIED HYPERLIPIDEMIA TYPE: ICD-10-CM

## 2019-07-03 DIAGNOSIS — I10 ESSENTIAL HYPERTENSION: ICD-10-CM

## 2019-07-03 RX ORDER — PANTOPRAZOLE SODIUM 40 MG/1
40 TABLET, DELAYED RELEASE ORAL DAILY
Qty: 30 TABLET | Refills: 5 | Status: SHIPPED | OUTPATIENT
Start: 2019-07-03 | End: 2019-12-31

## 2019-07-03 RX ORDER — LOSARTAN POTASSIUM AND HYDROCHLOROTHIAZIDE 25; 100 MG/1; MG/1
1 TABLET ORAL DAILY
Qty: 30 TABLET | Refills: 5 | Status: SHIPPED | OUTPATIENT
Start: 2019-07-03 | End: 2019-12-31

## 2019-07-03 RX ORDER — ATORVASTATIN CALCIUM 20 MG/1
20 TABLET, FILM COATED ORAL DAILY
Qty: 30 TABLET | Refills: 5 | Status: SHIPPED | OUTPATIENT
Start: 2019-07-03 | End: 2019-12-31

## 2019-07-03 NOTE — TELEPHONE ENCOUNTER
LRF 12-28-18 Atorvastatin # 30 RF 5, losartan-hctz # 30 RF 5, pantoprazole # 30 RF 5  LOV 4-29-19  RTO for routine follow up    Health Maintenance   Topic Date Due    Pneumococcal 0-64 years Vaccine (1 of 1 - PPSV23) 12/25/1961    Shingles Vaccine (1 of 2) 12/25/2005    Flu vaccine (1) 09/01/2019    Diabetic foot exam  09/17/2019    Diabetic microalbuminuria test  09/17/2019    Lipid screen  01/18/2020    Diabetic retinal exam  02/06/2020    A1C test (Diabetic or Prediabetic)  04/24/2020    TSH testing  04/24/2020    Potassium monitoring  04/24/2020    Creatinine monitoring  04/24/2020    Colon Cancer Screen FIT/FOBT  04/29/2020    Cervical cancer screen  11/17/2020    Breast cancer screen  04/24/2021    Hepatitis C screen  Completed    HIV screen  Completed             (applicable per patient's age: Cancer Screenings, Depression Screening, Fall Risk Screening, Immunizations)    Hemoglobin A1C (%)   Date Value   04/24/2019 7.0 (H)   01/18/2019 7.5 (H)   09/17/2018 7.4 (H)     Microalb/Crt.  Ratio (mcg/mg creat)   Date Value   09/17/2018 CANNOT BE CALCULATED     LDL Cholesterol (mg/dL)   Date Value   01/18/2019 34 (L)     LDL Calculated (mg/dL)   Date Value   04/13/2015 32     AST (U/L)   Date Value   04/24/2019 40 (H)     ALT (U/L)   Date Value   04/24/2019 42 (H)     BUN (mg/dL)   Date Value   04/24/2019 17      (goal A1C is < 7)   (goal LDL is <100) need 30-50% reduction from baseline     BP Readings from Last 3 Encounters:   04/29/19 121/80   01/16/19 126/78   11/16/18 118/82    (goal /80)      All Future Testing planned in [facility]:  Lab Frequency Next Occurrence   XR ANKLE RIGHT (MIN 3 VIEWS) Once 09/17/2018   Uric Acid Once 09/17/2018   T4, Free Once 09/17/2018   Comprehensive Metabolic Panel Once 01/42/0553   Lipid Panel Once 12/19/2018   T4, Free Once 12/19/2018   Hemoglobin A1C Once 12/19/2018   TSH without Reflex Once 12/19/2018   Uric Acid Once 12/19/2018   (Gxt) Stress Test Exercise

## 2019-07-09 RX ORDER — ATORVASTATIN CALCIUM 20 MG/1
TABLET, FILM COATED ORAL
Qty: 30 TABLET | Refills: 4 | OUTPATIENT
Start: 2019-07-09

## 2019-07-09 RX ORDER — LOSARTAN POTASSIUM AND HYDROCHLOROTHIAZIDE 25; 100 MG/1; MG/1
TABLET ORAL
Qty: 30 TABLET | Refills: 4 | OUTPATIENT
Start: 2019-07-09

## 2019-07-09 RX ORDER — PANTOPRAZOLE SODIUM 40 MG/1
TABLET, DELAYED RELEASE ORAL
Qty: 27 TABLET | Refills: 4 | OUTPATIENT
Start: 2019-07-09

## 2019-09-03 DIAGNOSIS — E11.69 DIABETES MELLITUS TYPE 2 IN OBESE (HCC): Chronic | ICD-10-CM

## 2019-09-03 DIAGNOSIS — E66.9 DIABETES MELLITUS TYPE 2 IN OBESE (HCC): Chronic | ICD-10-CM

## 2019-09-09 RX ORDER — METFORMIN HYDROCHLORIDE 500 MG/1
2000 TABLET, EXTENDED RELEASE ORAL
Qty: 120 TABLET | Refills: 5 | Status: SHIPPED | OUTPATIENT
Start: 2019-09-09 | End: 2020-07-01

## 2019-09-09 NOTE — TELEPHONE ENCOUNTER
LOV was 4-29-19, LR was 9-20-18. cm  Does patient have enough medication for 72 hours: No:     Next Visit Date:  No future appointments. Health Maintenance   Topic Date Due    Pneumococcal 0-64 years Vaccine (1 of 1 - PPSV23) 12/25/1961    Shingles Vaccine (1 of 2) 12/25/2005    Flu vaccine (1) 09/01/2019    Diabetic foot exam  09/17/2019    Diabetic microalbuminuria test  09/17/2019    Lipid screen  01/18/2020    Diabetic retinal exam  02/06/2020    A1C test (Diabetic or Prediabetic)  04/24/2020    TSH testing  04/24/2020    Potassium monitoring  04/24/2020    Creatinine monitoring  04/24/2020    Colon Cancer Screen FIT/FOBT  04/29/2020    Cervical cancer screen  11/17/2020    Breast cancer screen  04/24/2021    Hepatitis C screen  Completed    HIV screen  Completed       Hemoglobin A1C (%)   Date Value   04/24/2019 7.0 (H)   01/18/2019 7.5 (H)   09/17/2018 7.4 (H)             ( goal A1C is < 7)   Microalb/Crt.  Ratio (mcg/mg creat)   Date Value   09/17/2018 CANNOT BE CALCULATED     LDL Cholesterol (mg/dL)   Date Value   01/18/2019 34 (L)   09/17/2018 36     LDL Calculated (mg/dL)   Date Value   04/13/2015 32   07/22/2014 57       (goal LDL is <100)   AST (U/L)   Date Value   04/24/2019 40 (H)     ALT (U/L)   Date Value   04/24/2019 42 (H)     BUN (mg/dL)   Date Value   04/24/2019 17     BP Readings from Last 3 Encounters:   04/29/19 121/80   01/16/19 126/78   11/16/18 118/82          (goal 120/80)    All Future Testing planned in [facility]  Lab Frequency Next Occurrence   XR ANKLE RIGHT (MIN 3 VIEWS) Once 09/17/2018   Uric Acid Once 09/17/2018   T4, Free Once 09/17/2018   Comprehensive Metabolic Panel Once 77/75/8441   Lipid Panel Once 12/19/2018   T4, Free Once 12/19/2018   Hemoglobin A1C Once 12/19/2018   TSH without Reflex Once 12/19/2018   Uric Acid Once 12/19/2018   (Gxt) Stress Test Exercise W Out Myoview Once 10/10/2018   Brain Natriuretic Peptide Once 01/16/2019   XR CHEST STANDARD (2 VW)

## 2019-11-04 DIAGNOSIS — M1A.09X0 CHRONIC GOUT OF MULTIPLE SITES, UNSPECIFIED CAUSE: Primary | ICD-10-CM

## 2019-11-04 RX ORDER — COLCHICINE 0.6 MG/1
0.6 TABLET ORAL DAILY
Qty: 30 TABLET | Refills: 5 | Status: SHIPPED | OUTPATIENT
Start: 2019-11-04 | End: 2021-02-07

## 2019-11-11 ENCOUNTER — HOSPITAL ENCOUNTER (OUTPATIENT)
Age: 64
Setting detail: SPECIMEN
Discharge: HOME | End: 2019-11-11
Payer: COMMERCIAL

## 2019-11-11 ENCOUNTER — OFFICE VISIT (OUTPATIENT)
Dept: FAMILY MEDICINE CLINIC | Age: 64
End: 2019-11-11
Payer: COMMERCIAL

## 2019-11-11 VITALS
RESPIRATION RATE: 20 BRPM | BODY MASS INDEX: 33.13 KG/M2 | DIASTOLIC BLOOD PRESSURE: 88 MMHG | WEIGHT: 187 LBS | TEMPERATURE: 97.4 F | SYSTOLIC BLOOD PRESSURE: 121 MMHG

## 2019-11-11 DIAGNOSIS — M25.551 RIGHT HIP PAIN: ICD-10-CM

## 2019-11-11 DIAGNOSIS — E11.42 TYPE 2 DIABETES MELLITUS WITH DIABETIC POLYNEUROPATHY, WITHOUT LONG-TERM CURRENT USE OF INSULIN (HCC): Primary | ICD-10-CM

## 2019-11-11 DIAGNOSIS — E78.5 HYPERLIPIDEMIA, UNSPECIFIED HYPERLIPIDEMIA TYPE: ICD-10-CM

## 2019-11-11 DIAGNOSIS — E03.9 HYPOTHYROIDISM, UNSPECIFIED TYPE: ICD-10-CM

## 2019-11-11 DIAGNOSIS — R05.9 COUGH: ICD-10-CM

## 2019-11-11 DIAGNOSIS — Z23 NEED FOR INFLUENZA VACCINATION: ICD-10-CM

## 2019-11-11 DIAGNOSIS — E11.42 DIABETIC POLYNEUROPATHY ASSOCIATED WITH TYPE 2 DIABETES MELLITUS (HCC): ICD-10-CM

## 2019-11-11 DIAGNOSIS — R82.90 ABNORMAL URINE ODOR: ICD-10-CM

## 2019-11-11 DIAGNOSIS — Z12.11 SCREENING FOR COLORECTAL CANCER: ICD-10-CM

## 2019-11-11 DIAGNOSIS — F41.9 ANXIETY: ICD-10-CM

## 2019-11-11 DIAGNOSIS — M16.11 PRIMARY OSTEOARTHRITIS OF RIGHT HIP: ICD-10-CM

## 2019-11-11 DIAGNOSIS — K21.9 GASTROESOPHAGEAL REFLUX DISEASE, ESOPHAGITIS PRESENCE NOT SPECIFIED: ICD-10-CM

## 2019-11-11 DIAGNOSIS — Z12.12 SCREENING FOR COLORECTAL CANCER: ICD-10-CM

## 2019-11-11 DIAGNOSIS — M1A.09X0 CHRONIC GOUT OF MULTIPLE SITES, UNSPECIFIED CAUSE: ICD-10-CM

## 2019-11-11 DIAGNOSIS — I10 ESSENTIAL HYPERTENSION: ICD-10-CM

## 2019-11-11 DIAGNOSIS — J30.2 SEASONAL ALLERGIES: ICD-10-CM

## 2019-11-11 DIAGNOSIS — Z23 NEED FOR PNEUMOCOCCAL VACCINATION: ICD-10-CM

## 2019-11-11 LAB
BILIRUBIN URINE: NEGATIVE
COLOR: YELLOW
COMMENT UA: NORMAL
CREATININE URINE: 42 MG/DL (ref 28–217)
GLUCOSE URINE: NEGATIVE
KETONES, URINE: NEGATIVE
LEUKOCYTE ESTERASE, URINE: NEGATIVE
MICROALBUMIN/CREAT 24H UR: <12 MG/L
MICROALBUMIN/CREAT UR-RTO: NORMAL MCG/MG CREAT
NITRITE, URINE: NEGATIVE
PH UA: 7 (ref 5–8)
PROTEIN UA: NEGATIVE
SPECIFIC GRAVITY UA: 1.01 (ref 1–1.03)
TURBIDITY: CLEAR
URINE HGB: NEGATIVE
UROBILINOGEN, URINE: NORMAL

## 2019-11-11 PROCEDURE — 90686 IIV4 VACC NO PRSV 0.5 ML IM: CPT | Performed by: PEDIATRICS

## 2019-11-11 PROCEDURE — 90471 IMMUNIZATION ADMIN: CPT | Performed by: PEDIATRICS

## 2019-11-11 PROCEDURE — 90472 IMMUNIZATION ADMIN EACH ADD: CPT | Performed by: PEDIATRICS

## 2019-11-11 PROCEDURE — 99214 OFFICE O/P EST MOD 30 MIN: CPT | Performed by: PEDIATRICS

## 2019-11-11 PROCEDURE — 90732 PPSV23 VACC 2 YRS+ SUBQ/IM: CPT | Performed by: PEDIATRICS

## 2019-11-11 ASSESSMENT — ENCOUNTER SYMPTOMS
BLURRED VISION: 0
SHORTNESS OF BREATH: 0
ORTHOPNEA: 0

## 2019-11-12 ASSESSMENT — ENCOUNTER SYMPTOMS
VOICE CHANGE: 0
VOMITING: 0
EYE DISCHARGE: 0
EYE PAIN: 0
CHEST TIGHTNESS: 0
RHINORRHEA: 0
CONSTIPATION: 0
DIARRHEA: 0
EYE REDNESS: 0
ABDOMINAL PAIN: 0
WHEEZING: 0
COLOR CHANGE: 0
COUGH: 1
SORE THROAT: 0

## 2019-11-13 LAB
CULTURE: NORMAL
Lab: NORMAL
SPECIMEN DESCRIPTION: NORMAL

## 2019-11-18 ENCOUNTER — TELEPHONE (OUTPATIENT)
Dept: GASTROENTEROLOGY | Age: 64
End: 2019-11-18

## 2019-12-05 DIAGNOSIS — E03.9 HYPOTHYROIDISM, UNSPECIFIED TYPE: ICD-10-CM

## 2019-12-05 RX ORDER — LEVOTHYROXINE SODIUM 150 MCG
150 TABLET ORAL DAILY
Qty: 30 TABLET | Refills: 5 | Status: SHIPPED | OUTPATIENT
Start: 2019-12-05 | End: 2020-05-29

## 2019-12-06 ENCOUNTER — HOSPITAL ENCOUNTER (OUTPATIENT)
Age: 64
Setting detail: SPECIMEN
Discharge: HOME | End: 2019-12-06
Payer: COMMERCIAL

## 2019-12-06 DIAGNOSIS — E78.5 HYPERLIPIDEMIA, UNSPECIFIED HYPERLIPIDEMIA TYPE: ICD-10-CM

## 2019-12-06 DIAGNOSIS — I10 ESSENTIAL HYPERTENSION: ICD-10-CM

## 2019-12-06 DIAGNOSIS — E11.42 TYPE 2 DIABETES MELLITUS WITH DIABETIC POLYNEUROPATHY, WITHOUT LONG-TERM CURRENT USE OF INSULIN (HCC): ICD-10-CM

## 2019-12-06 DIAGNOSIS — E03.9 HYPOTHYROIDISM, UNSPECIFIED TYPE: ICD-10-CM

## 2019-12-06 DIAGNOSIS — M1A.09X0 CHRONIC GOUT OF MULTIPLE SITES, UNSPECIFIED CAUSE: ICD-10-CM

## 2019-12-06 LAB
ALBUMIN SERPL-MCNC: 4.4 G/DL (ref 3.5–5.2)
ALBUMIN/GLOBULIN RATIO: 1.5 (ref 1–2.5)
ALP BLD-CCNC: 65 U/L (ref 35–104)
ALT SERPL-CCNC: 35 U/L (ref 5–33)
ANION GAP SERPL CALCULATED.3IONS-SCNC: 14 MMOL/L (ref 9–17)
AST SERPL-CCNC: 36 U/L
BILIRUB SERPL-MCNC: 0.5 MG/DL (ref 0.3–1.2)
BUN BLDV-MCNC: 16 MG/DL (ref 8–23)
BUN/CREAT BLD: ABNORMAL (ref 9–20)
CALCIUM SERPL-MCNC: 10 MG/DL (ref 8.6–10.4)
CHLORIDE BLD-SCNC: 102 MMOL/L (ref 98–107)
CHOLESTEROL/HDL RATIO: 3.6
CHOLESTEROL: 129 MG/DL
CO2: 26 MMOL/L (ref 20–31)
CREAT SERPL-MCNC: 0.98 MG/DL (ref 0.5–0.9)
ESTIMATED AVERAGE GLUCOSE: 157 MG/DL
GFR AFRICAN AMERICAN: >60 ML/MIN
GFR NON-AFRICAN AMERICAN: 57 ML/MIN
GFR SERPL CREATININE-BSD FRML MDRD: ABNORMAL ML/MIN/{1.73_M2}
GFR SERPL CREATININE-BSD FRML MDRD: ABNORMAL ML/MIN/{1.73_M2}
GLUCOSE BLD-MCNC: 108 MG/DL (ref 70–99)
HBA1C MFR BLD: 7.1 % (ref 4–6)
HCT VFR BLD CALC: 41.4 % (ref 36.3–47.1)
HDLC SERPL-MCNC: 36 MG/DL
HEMOGLOBIN: 13.1 G/DL (ref 11.9–15.1)
LDL CHOLESTEROL: 48 MG/DL (ref 0–130)
MCH RBC QN AUTO: 28.1 PG (ref 25.2–33.5)
MCHC RBC AUTO-ENTMCNC: 31.6 G/DL (ref 28.4–34.8)
MCV RBC AUTO: 88.8 FL (ref 82.6–102.9)
NRBC AUTOMATED: 0 PER 100 WBC
PDW BLD-RTO: 13.1 % (ref 11.8–14.4)
PLATELET # BLD: 333 K/UL (ref 138–453)
PMV BLD AUTO: 10.6 FL (ref 8.1–13.5)
POTASSIUM SERPL-SCNC: 4 MMOL/L (ref 3.7–5.3)
RBC # BLD: 4.66 M/UL (ref 3.95–5.11)
SODIUM BLD-SCNC: 142 MMOL/L (ref 135–144)
THYROXINE, FREE: 1.62 NG/DL (ref 0.93–1.7)
TOTAL PROTEIN: 7.3 G/DL (ref 6.4–8.3)
TRIGL SERPL-MCNC: 225 MG/DL
TSH SERPL DL<=0.05 MIU/L-ACNC: 4.71 MIU/L (ref 0.3–5)
URIC ACID: 9 MG/DL (ref 2.4–5.7)
VLDLC SERPL CALC-MCNC: ABNORMAL MG/DL (ref 1–30)
WBC # BLD: 6.7 K/UL (ref 3.5–11.3)

## 2019-12-10 ENCOUNTER — TELEPHONE (OUTPATIENT)
Dept: GASTROENTEROLOGY | Age: 64
End: 2019-12-10

## 2019-12-13 DIAGNOSIS — E78.5 HYPERLIPIDEMIA, UNSPECIFIED HYPERLIPIDEMIA TYPE: Primary | ICD-10-CM

## 2019-12-13 DIAGNOSIS — M1A.09X0 CHRONIC GOUT OF MULTIPLE SITES, UNSPECIFIED CAUSE: ICD-10-CM

## 2019-12-13 DIAGNOSIS — I10 ESSENTIAL HYPERTENSION: ICD-10-CM

## 2019-12-13 DIAGNOSIS — E11.42 TYPE 2 DIABETES MELLITUS WITH DIABETIC POLYNEUROPATHY, WITHOUT LONG-TERM CURRENT USE OF INSULIN (HCC): ICD-10-CM

## 2019-12-13 DIAGNOSIS — E03.9 HYPOTHYROIDISM, UNSPECIFIED TYPE: ICD-10-CM

## 2020-05-29 RX ORDER — LEVOTHYROXINE SODIUM 150 MCG
150 TABLET ORAL DAILY
Qty: 30 TABLET | Refills: 5 | Status: SHIPPED | OUTPATIENT
Start: 2020-05-29 | End: 2020-11-23 | Stop reason: SDUPTHER

## 2020-05-29 NOTE — TELEPHONE ENCOUNTER
Last OARRS Review-0  Last Office Visit-11/11/2019  Return To Office-6 months   Next Appointment Date-0  Last Refill Date-12/05/2019  30 tabs   Number of Refills Given- 5      Health Maintenance   Topic Date Due    Shingles Vaccine (1 of 2) 12/25/2005    Diabetic foot exam  09/17/2019    Diabetic retinal exam  02/06/2020    Colon Cancer Screen FIT/FOBT  04/29/2020    Diabetic microalbuminuria test  11/11/2020    Cervical cancer screen  11/17/2020    A1C test (Diabetic or Prediabetic)  12/06/2020    Lipid screen  12/06/2020    TSH testing  12/06/2020    Potassium monitoring  12/06/2020    Creatinine monitoring  12/06/2020    Breast cancer screen  04/24/2021    Flu vaccine  Completed    Pneumococcal 0-64 years Vaccine  Completed    Hepatitis C screen  Completed    HIV screen  Completed    Hepatitis A vaccine  Aged Out    Hib vaccine  Aged Out    Meningococcal (ACWY) vaccine  Aged Out             (applicable per patient's age: Cancer Screenings, Depression Screening, Fall Risk Screening, Immunizations)    Hemoglobin A1C (%)   Date Value   12/06/2019 7.1 (H)   04/24/2019 7.0 (H)   01/18/2019 7.5 (H)     Microalb/Crt.  Ratio (mcg/mg creat)   Date Value   11/11/2019 CANNOT BE CALCULATED     LDL Cholesterol (mg/dL)   Date Value   12/06/2019 48     LDL Calculated (mg/dL)   Date Value   04/13/2015 32     AST (U/L)   Date Value   12/06/2019 36 (H)     ALT (U/L)   Date Value   12/06/2019 35 (H)     BUN (mg/dL)   Date Value   12/06/2019 16      (goal A1C is < 7)   (goal LDL is <100) need 30-50% reduction from baseline     BP Readings from Last 3 Encounters:   11/11/19 121/88   04/29/19 121/80   01/16/19 126/78    (goal /80)      All Future Testing planned in [facility]:  Lab Frequency Next Occurrence   Lipid Panel Once 06/13/2020   Comprehensive Metabolic Panel Once 01/87/1740   Hemoglobin A1C Once 06/13/2020   TSH without Reflex Once 06/13/2020   T4, Free Once 06/13/2020   Uric Acid Once 06/13/2020   CBC

## 2020-06-11 ENCOUNTER — TELEMEDICINE (OUTPATIENT)
Dept: FAMILY MEDICINE CLINIC | Age: 65
End: 2020-06-11
Payer: COMMERCIAL

## 2020-06-11 ENCOUNTER — NURSE TRIAGE (OUTPATIENT)
Dept: OTHER | Facility: CLINIC | Age: 65
End: 2020-06-11

## 2020-06-11 PROCEDURE — 99213 OFFICE O/P EST LOW 20 MIN: CPT | Performed by: NURSE PRACTITIONER

## 2020-06-11 RX ORDER — METHYLPREDNISOLONE 4 MG/1
TABLET ORAL
Qty: 1 KIT | Refills: 0 | Status: SHIPPED | OUTPATIENT
Start: 2020-06-11 | End: 2020-06-17

## 2020-06-11 RX ORDER — COLCHICINE 0.6 MG/1
0.6 TABLET ORAL DAILY PRN
COMMUNITY
Start: 2020-05-26 | End: 2020-11-23 | Stop reason: SDUPTHER

## 2020-06-11 RX ORDER — CYCLOBENZAPRINE HCL 5 MG
5 TABLET ORAL EVERY 8 HOURS PRN
Qty: 30 TABLET | Refills: 0 | Status: SHIPPED | OUTPATIENT
Start: 2020-06-11 | End: 2020-07-11

## 2020-06-11 ASSESSMENT — PATIENT HEALTH QUESTIONNAIRE - PHQ9
2. FEELING DOWN, DEPRESSED OR HOPELESS: 0
SUM OF ALL RESPONSES TO PHQ QUESTIONS 1-9: 0
1. LITTLE INTEREST OR PLEASURE IN DOING THINGS: 0
SUM OF ALL RESPONSES TO PHQ9 QUESTIONS 1 & 2: 0
SUM OF ALL RESPONSES TO PHQ QUESTIONS 1-9: 0

## 2020-06-11 ASSESSMENT — ENCOUNTER SYMPTOMS
COLOR CHANGE: 0
BACK PAIN: 1

## 2020-06-11 NOTE — TELEPHONE ENCOUNTER
Reason for Disposition   Weakness (i.e., loss of strength) in hand or fingers    Answer Assessment - Initial Assessment Questions  1. ONSET: \"When did the pain start? \"      About 1 week. Saw a chiropractor on tuesday  2. LOCATION: \"Where is the pain located? \"  Right arm/shoulder  3. PAIN: \"How bad is the pain? \" (Scale 1-10; or mild, moderate, severe)    - MILD (1-3): doesn't interfere with normal activities    - MODERATE (4-7): interferes with normal activities (e.g., work or school) or awakens from sleep    - SEVERE (8-10): excruciating pain, unable to do any normal activities, unable to hold a cup of water  Pain is a 6-7/10, more pain when she moves arm in front of her  4. WORK OR EXERCISE: \"Has there been any recent work or exercise that involved this part of the body?\"    5. CAUSE: \"What do you think is causing the arm pain? \"  Unsure, no injury. Maybe lifted flower pots wrong  6. OTHER SYMPTOMS: \"Do you have any other symptoms? \" (e.g., neck pain, swelling, rash, fever, numbness, weakness)  Fingertips feel numb  7. PREGNANCY: \"Is there any chance you are pregnant? \" \"When was your last menstrual period? \"    Protocols used: ARM PAIN-ADULT-OH  Received call from [facility]. Pt calling with right arm/shoulder pain for the past week. Pain is a 6-7/10. Feels like pain will shoot down her arm when she moves it. Fingertips feel numb. No chest pain, no sob, no fever. Recommend pt is seen today, call back if any new or worsening symptoms. Call soft transferred to [address] to schedule appointment. Please do not reply to the triage nurse through this encounter. Any subsequent communication should be directly with the patient.

## 2020-06-11 NOTE — PATIENT INSTRUCTIONS
Patient Education        Learning About Pinched Nerves  What is it? A pinched nerve (nerve entrapment) is a problem that happens when a nerve is squeezed in a tight space in the body. Nerves can get pinched between bones, tendons, or muscles. Usually this happens because of an injury or overuse. Pinched nerves can be painful. What causes it? The cause depends on what nerve is affected. Muscles, bones, tendons, or scar tissue can squeeze nerves. So can swelling, tight shoes or equipment, or an injury. Pinched nerves are also more likely to happen with overuse, staying in one position too long, or having conditions like arthritis. What are the symptoms? The symptoms of a pinched nerve may include pain, tingling or numbness, or weakness. They may be felt in the area of the body served by the nerve. The symptoms can get worse when you move in certain ways. How is it diagnosed? Your doctor will do a physical exam. Depending on where you have symptoms, the exam will include watching how you move, checking your reflexes to see how your nerves are working, and checking for muscle weakness. Your doctor may order tests, such as:  · Nerve tests. These tests show how well and how fast the nerves send electrical signals to your muscles. · X-rays. · Imaging tests, such as an MRI or a CT scan. These tests can show what's putting pressure on a nerve. How is it treated? Treatment for a pinched nerve can include rest, stretching, and anti-inflammatory medicines. It can also include steroid shots and sometimes surgery. Other treatment may include wearing a brace, orthotics, or other types of support for the area. How can you care for yourself at home? · Follow your doctor's advice for rest, level of activity, how to protect the area, and stretching. · Be safe with medicines. Read and follow all instructions on the label. ? If the doctor gave you a prescription medicine for pain, take it as prescribed.   ? If you are

## 2020-07-01 RX ORDER — METFORMIN HYDROCHLORIDE 500 MG/1
2000 TABLET, EXTENDED RELEASE ORAL
Qty: 120 TABLET | Refills: 4 | Status: SHIPPED | OUTPATIENT
Start: 2020-07-01 | End: 2020-11-25

## 2020-07-01 RX ORDER — LOSARTAN POTASSIUM AND HYDROCHLOROTHIAZIDE 25; 100 MG/1; MG/1
1 TABLET ORAL DAILY
Qty: 30 TABLET | Refills: 5 | Status: SHIPPED | OUTPATIENT
Start: 2020-07-01 | End: 2020-12-28

## 2020-07-01 RX ORDER — PANTOPRAZOLE SODIUM 40 MG/1
40 TABLET, DELAYED RELEASE ORAL DAILY
Qty: 30 TABLET | Refills: 5 | Status: SHIPPED | OUTPATIENT
Start: 2020-07-01 | End: 2020-12-28

## 2020-07-01 RX ORDER — ATORVASTATIN CALCIUM 20 MG/1
20 TABLET, FILM COATED ORAL DAILY
Qty: 30 TABLET | Refills: 5 | Status: SHIPPED | OUTPATIENT
Start: 2020-07-01 | End: 2020-12-28

## 2020-07-01 RX ORDER — CEPHALEXIN 500 MG/1
500 CAPSULE ORAL 3 TIMES DAILY
Qty: 30 CAPSULE | Refills: 0 | Status: SHIPPED | OUTPATIENT
Start: 2020-07-01 | End: 2020-07-11

## 2020-07-02 NOTE — TELEPHONE ENCOUNTER
Patient informed. Scripts were confirmed at [facility] for her to . She asked if she can pop blisters on her own. I consulted vargas lind for this. Advised not to pop hr own but if they happen to pop on their own, to keep area clean and take abx to help fight infection.
Please let Peg know that I did send a prescription to the pharmacy for an antibiotic. Not sure that this will help the blisters but she can at least try.
Hypertension     Type 2 diabetes mellitus with diabetic polyneuropathy, without long-term current use of insulin (HCC)     Hypothyroidism     Hyperlipidemia     Gastroesophageal reflux disease     Diabetic polyneuropathy associated with type 2 diabetes mellitus (HCC)     Chronic gout of multiple sites

## 2020-07-07 ENCOUNTER — OFFICE VISIT (OUTPATIENT)
Dept: FAMILY MEDICINE CLINIC | Age: 65
End: 2020-07-07
Payer: COMMERCIAL

## 2020-07-07 VITALS
OXYGEN SATURATION: 99 % | TEMPERATURE: 96.8 F | DIASTOLIC BLOOD PRESSURE: 80 MMHG | HEART RATE: 59 BPM | RESPIRATION RATE: 14 BRPM | HEIGHT: 63 IN | SYSTOLIC BLOOD PRESSURE: 120 MMHG | BODY MASS INDEX: 31.89 KG/M2 | WEIGHT: 180 LBS

## 2020-07-07 PROCEDURE — 99213 OFFICE O/P EST LOW 20 MIN: CPT | Performed by: NURSE PRACTITIONER

## 2020-07-07 SDOH — ECONOMIC STABILITY: FOOD INSECURITY: WITHIN THE PAST 12 MONTHS, YOU WORRIED THAT YOUR FOOD WOULD RUN OUT BEFORE YOU GOT MONEY TO BUY MORE.: NEVER TRUE

## 2020-07-07 SDOH — ECONOMIC STABILITY: TRANSPORTATION INSECURITY
IN THE PAST 12 MONTHS, HAS THE LACK OF TRANSPORTATION KEPT YOU FROM MEDICAL APPOINTMENTS OR FROM GETTING MEDICATIONS?: NO

## 2020-07-07 SDOH — ECONOMIC STABILITY: INCOME INSECURITY: HOW HARD IS IT FOR YOU TO PAY FOR THE VERY BASICS LIKE FOOD, HOUSING, MEDICAL CARE, AND HEATING?: NOT HARD AT ALL

## 2020-07-07 SDOH — ECONOMIC STABILITY: FOOD INSECURITY: WITHIN THE PAST 12 MONTHS, THE FOOD YOU BOUGHT JUST DIDN'T LAST AND YOU DIDN'T HAVE MONEY TO GET MORE.: NEVER TRUE

## 2020-07-07 SDOH — ECONOMIC STABILITY: TRANSPORTATION INSECURITY
IN THE PAST 12 MONTHS, HAS LACK OF TRANSPORTATION KEPT YOU FROM MEETINGS, WORK, OR FROM GETTING THINGS NEEDED FOR DAILY LIVING?: NO

## 2020-07-07 ASSESSMENT — PATIENT HEALTH QUESTIONNAIRE - PHQ9
SUM OF ALL RESPONSES TO PHQ9 QUESTIONS 1 & 2: 0
SUM OF ALL RESPONSES TO PHQ QUESTIONS 1-9: 0
SUM OF ALL RESPONSES TO PHQ QUESTIONS 1-9: 0
1. LITTLE INTEREST OR PLEASURE IN DOING THINGS: 0
2. FEELING DOWN, DEPRESSED OR HOPELESS: 0

## 2020-07-07 NOTE — PROGRESS NOTES
Subjective:      Visit Information    Have you changed or started any medications since your last visit including any over-the-counter medicines, vitamins, or herbal medicines? no   Are you having any side effects from any of your medications? -  no  Have you stopped taking any of your medications? Is so, why? -  no    Have you seen any other physician or provider since your last visit? Chiropractor for shoulder    Have you had any other diagnostic tests since your last visit? No  Have you been seen in the emergency room and/or had an admission to a hospital since we last saw you? No  Have you had your routine dental cleaning in the past 6 months? no    Have you activated your [employer] account? If not, what are your barriers?  Yes     Patient Care Team:  Sher Bower MD as PCP - General (Pediatrics)  Sher Bower MD as PCP - [facility]    Medical History Review  Past Medical, Family, and Social History reviewed and does contribute to the patient presenting condition    Health Maintenance   Topic Date Due    Colon Cancer Screen FIT/FOBT  04/29/2020    Diabetic foot exam  07/07/2021 (Originally 9/17/2019)    Shingles Vaccine (1 of 2) 07/07/2021 (Originally 12/25/2005)    Diabetic retinal exam  07/17/2021 (Originally 2/6/2020)    Flu vaccine (1) 09/01/2020    Diabetic microalbuminuria test  11/11/2020    Cervical cancer screen  11/17/2020    A1C test (Diabetic or Prediabetic)  12/06/2020    Lipid screen  12/06/2020    TSH testing  12/06/2020    Potassium monitoring  12/06/2020    Creatinine monitoring  12/06/2020    Breast cancer screen  04/24/2021    Pneumococcal 0-64 years Vaccine  Completed    Hepatitis C screen  Completed    HIV screen  Completed    Hepatitis A vaccine  Aged Out    Hib vaccine  Aged Out    Meningococcal (ACWY) vaccine  Aged Out       Current Outpatient Medications   Medication Sig Dispense Refill    mupirocin (Kylie Sanchez) 2 % ointment Apply 3 times daily. 1 Tube 0    losartan-hydrochlorothiazide (HYZAAR) 100-25 MG per tablet Take 1 tablet by mouth daily 30 tablet 5    atorvastatin (LIPITOR) 20 MG tablet Take 1 tablet by mouth daily 30 tablet 5    pantoprazole (PROTONIX) 40 MG tablet Take 1 tablet by mouth daily Do not crush or break. 30 tablet 5    metFORMIN (GLUCOPHAGE-XR) 500 MG extended release tablet Take 4 tablets by mouth daily (with breakfast) 120 tablet 4    cephALEXin (KEFLEX) 500 MG capsule Take 1 capsule by mouth 3 times daily for 10 days 30 capsule 0    colchicine (COLCRYS) 0.6 MG tablet Take 0.6 mg by mouth daily as needed      cyclobenzaprine (FLEXERIL) 5 MG tablet Take 1 tablet by mouth every 8 hours as needed for Muscle spasms Ok to take 10 mg at night if needed. 30 tablet 0    SYNTHROID 150 MCG tablet Take 1 tablet by mouth Daily 30 tablet 5    colchicine (COLCRYS) 0.6 MG tablet Take 1 tablet by mouth daily 30 tablet 5    carvedilol (COREG) 25 MG tablet Take 25 mg by mouth 2 times daily      allopurinol (ZYLOPRIM) 100 MG tablet Take 1 tablet by mouth daily 30 tablet 5    clobetasol (TEMOVATE) 0.05 % ointment Apply a think layer topically 1 time daily. 60 g 1    glucose blood VI test strips ([employer] BLOOD GLUCOSE TEST) strip Test blood sugar once daily. Dx: Diabetes mellitus type 2 in obese 250.00, 278.00 50 each 11    aspirin 81 MG EC tablet Take 81 mg by mouth daily.  docusate sodium (COLACE) 100 MG capsule Take 100 mg by mouth daily. No current facility-administered medications for this visit. Patient ID: Dany Geronimo is a 59 y.o. female. Patient presents in office today with concerns about large blister on both feet that started last week Monday. Had small blister and grew to larger blister. See photos. Granddaughter stepped on foot on Sunday and blister on left foot broke. Blister on right foot on Sunday night was leaking a little bit.  Used some alcohol and small pin and let a bulla    Opened and healing. Keep affected areas clean and dry. Non-adherent dressing and coban applied to wounds. 2. Open wound of left foot, initial encounter    Apply topically to feet. - mupirocin (BACTROBAN) 2 % ointment; Apply 3 times daily. Dispense: 1 Tube; Refill: 0    3. Open wound of right foot, initial encounter    - mupirocin (BACTROBAN) 2 % ointment; Apply 3 times daily. Dispense: 1 Tube; Refill: 0    4. Acute gout of left foot, unspecified cause    Improved with colchicine. Will continue to monitor. Complete Keflex as prescribed by Dr. Remberto Soria. Monitor for worsening symptoms  Consider dermatology consult as recurrent symptoms annually. Call office with concerns  Recheck in 1 week            Salbador Desai received counseling on the following healthy behaviors: medication adherence  Reviewed prior labs and health maintenance. Continue current medications, diet and exercise. Discussed use, benefit, and side effects of prescribed medications. Barriers to medication compliance addressed. Patient given educational materials - see patient instructions. All patient questions answered. Patient voiced understanding.        Electronically signed by DORINA Siegel CNP on 7/10/2020 at 1:15 PM

## 2020-07-10 ASSESSMENT — ENCOUNTER SYMPTOMS
COUGH: 0
SHORTNESS OF BREATH: 0

## 2020-07-14 ENCOUNTER — OFFICE VISIT (OUTPATIENT)
Dept: FAMILY MEDICINE CLINIC | Age: 65
End: 2020-07-14
Payer: COMMERCIAL

## 2020-07-14 VITALS
SYSTOLIC BLOOD PRESSURE: 107 MMHG | OXYGEN SATURATION: 97 % | HEART RATE: 72 BPM | DIASTOLIC BLOOD PRESSURE: 68 MMHG | RESPIRATION RATE: 14 BRPM | WEIGHT: 179 LBS | HEIGHT: 63 IN | TEMPERATURE: 97.4 F | BODY MASS INDEX: 31.71 KG/M2

## 2020-07-14 PROCEDURE — 99213 OFFICE O/P EST LOW 20 MIN: CPT | Performed by: NURSE PRACTITIONER

## 2020-07-14 ASSESSMENT — PATIENT HEALTH QUESTIONNAIRE - PHQ9
SUM OF ALL RESPONSES TO PHQ9 QUESTIONS 1 & 2: 0
2. FEELING DOWN, DEPRESSED OR HOPELESS: 0
1. LITTLE INTEREST OR PLEASURE IN DOING THINGS: 0
SUM OF ALL RESPONSES TO PHQ QUESTIONS 1-9: 0
SUM OF ALL RESPONSES TO PHQ QUESTIONS 1-9: 0

## 2020-07-14 ASSESSMENT — ENCOUNTER SYMPTOMS
COUGH: 0
SHORTNESS OF BREATH: 0

## 2020-07-14 NOTE — PROGRESS NOTES
Subjective:      Visit Information     Have you changed or started any medications since your last visit including any over-the-counter medicines, vitamins, or herbal medicines? no   Are you having any side effects from any of your medications? -  no  Have you stopped taking any of your medications? Is so, why? -  no    Have you seen any other physician or provider since your last visit? No  Have you had any other diagnostic tests since your last visit? No  Have you been seen in the emergency room and/or had an admission to a hospital since we last saw you? No  Have you had your routine dental cleaning in the past 6 months? no    Have you activated your [facility] account? If not, what are your barriers? Yes     Patient Care Team:  Edwina Cazares MD as PCP - General (Pediatrics)  Edwina Cazares MD as PCP - [facility]    Medical History Review  Past Medical, Family, and Social History reviewed and does contribute to the patient presenting condition    Health Maintenance   Topic Date Due    Colon Cancer Screen FIT/FOBT  04/29/2020    Diabetic foot exam  07/07/2021 (Originally 9/17/2019)    Shingles Vaccine (1 of 2) 07/07/2021 (Originally 12/25/2005)    Diabetic retinal exam  07/17/2021 (Originally 2/6/2020)    Flu vaccine (1) 09/01/2020    Diabetic microalbuminuria test  11/11/2020    Cervical cancer screen  11/17/2020    A1C test (Diabetic or Prediabetic)  12/06/2020    Lipid screen  12/06/2020    TSH testing  12/06/2020    Potassium monitoring  12/06/2020    Creatinine monitoring  12/06/2020    Breast cancer screen  04/24/2021    Pneumococcal 0-64 years Vaccine  Completed    Hepatitis C screen  Completed    HIV screen  Completed    Hepatitis A vaccine  Aged Out    Hib vaccine  Aged Out    Meningococcal (ACWY) vaccine  Aged Out       Current Outpatient Medications   Medication Sig Dispense Refill    mupirocin (BACTROBAN) 2 % ointment Apply 3 times daily. 1 Tube 0    losartan-hydrochlorothiazide (HYZAAR) 100-25 MG per tablet Take 1 tablet by mouth daily 30 tablet 5    atorvastatin (LIPITOR) 20 MG tablet Take 1 tablet by mouth daily 30 tablet 5    pantoprazole (PROTONIX) 40 MG tablet Take 1 tablet by mouth daily Do not crush or break. 30 tablet 5    metFORMIN (GLUCOPHAGE-XR) 500 MG extended release tablet Take 4 tablets by mouth daily (with breakfast) 120 tablet 4    colchicine (COLCRYS) 0.6 MG tablet Take 0.6 mg by mouth daily as needed      SYNTHROID 150 MCG tablet Take 1 tablet by mouth Daily 30 tablet 5    colchicine (COLCRYS) 0.6 MG tablet Take 1 tablet by mouth daily 30 tablet 5    carvedilol (COREG) 25 MG tablet Take 25 mg by mouth 2 times daily      allopurinol (ZYLOPRIM) 100 MG tablet Take 1 tablet by mouth daily 30 tablet 5    clobetasol (TEMOVATE) 0.05 % ointment Apply a think layer topically 1 time daily. 60 g 1    glucose blood VI test strips ([employer] BLOOD GLUCOSE TEST) strip Test blood sugar once daily. Dx: Diabetes mellitus type 2 in obese 250.00, 278.00 50 each 11    aspirin 81 MG EC tablet Take 81 mg by mouth daily.  docusate sodium (COLACE) 100 MG capsule Take 100 mg by mouth daily. No current facility-administered medications for this visit. Patient ID: Randy Bermudez is a 59 y.o. female. Patient presents in office today for 1 week follow up on blisters on her feet. Finished oral Keflex. No bleeding. No drainage. No warmth. Healing well. Thought she was getting new yellow lesion on left foot but it is gone. Gout in foot has resolved. Continues to use colchycine. Tells me she uses for 7-10 days. Review of Systems   Constitutional: Negative for fever. Respiratory: Negative for cough and shortness of breath. Musculoskeletal: Negative for gait problem. Skin: Positive for wound (both feet). Psychiatric/Behavioral: Negative for sleep disturbance.          Objective:     /68 (Site: Left Upper Arm, Position: Sitting, Cuff Size: Medium Adult)   Pulse 72   Temp 97.4 °F (36.3 °C) (Temporal)   Resp 14   Ht 5' 3\" (1.6 m)   Wt 179 lb (81.2 kg)   SpO2 97%   BMI 31.71 kg/m²      Physical Exam  Vitals signs and nursing note reviewed. Constitutional:       General: She is not in acute distress. Appearance: She is not ill-appearing. HENT:      Head: Normocephalic and atraumatic. Cardiovascular:      Rate and Rhythm: Normal rate. Pulmonary:      Effort: Pulmonary effort is normal. No respiratory distress. Skin:     General: Skin is warm. Findings: Wound present. Neurological:      Mental Status: She is alert and oriented to person, place, and time. Psychiatric:         Mood and Affect: Mood normal.         Behavior: Behavior normal.         Assessment / Plan:     1. Skin bulla    2. Open wound of left foot, initial encounter    3. Open wound of right foot, initial encounter    Discussed patient and exam with dermatologist Dr. Stefanie Kothari last week. 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 Discussed with patient in detail today. Areas are healing well. Keep clean and dry. Completed oral antibiotic. Ok to use up bactroban ointment. Monitor for worsening symptoms  Call office with concerns          Syed Zamarripa received counseling on the following healthy behaviors: medication adherence  Reviewed prior labs and health maintenance. Continue current medications, diet and exercise. Discussed use, benefit, and side effects of prescribed medications. Barriers to medication compliance addressed. Patient given educational materials - see patient instructions. All patient questions answered. Patient voiced understanding.        Electronically signed by DORINA Blake CNP on 7/14/2020 at 10:10 AM

## 2020-11-18 ENCOUNTER — TELEPHONE (OUTPATIENT)
Dept: FAMILY MEDICINE CLINIC | Age: 65
End: 2020-11-18

## 2020-11-18 NOTE — TELEPHONE ENCOUNTER
Patient needs to cancel her 2 pm for today (11/18/2020) due to not receiving her covid results yet. Patient can be reached at [phone] if needed.

## 2020-11-23 ENCOUNTER — TELEPHONE (OUTPATIENT)
Dept: FAMILY MEDICINE CLINIC | Age: 65
End: 2020-11-23

## 2020-11-25 NOTE — TELEPHONE ENCOUNTER
Last visit:7/14/2020  Last Med refill:7/1/20  Does patient have enough medication for 72 hours: Yes    Next Visit Date:  No future appointments. Health Maintenance   Topic Date Due    Colon Cancer Screen FIT/FOBT  04/29/2020    Flu vaccine (1) 09/01/2020    Diabetic microalbuminuria test  11/11/2020    Cervical cancer screen  11/17/2020    A1C test (Diabetic or Prediabetic)  12/06/2020    Lipid screen  12/06/2020    TSH testing  12/06/2020    Potassium monitoring  12/06/2020    Creatinine monitoring  12/06/2020    Diabetic foot exam  07/07/2021 (Originally 9/17/2019)    Shingles Vaccine (1 of 2) 07/07/2021 (Originally 12/25/2005)    Diabetic retinal exam  07/17/2021 (Originally 2/6/2020)    Breast cancer screen  04/24/2021    Hepatitis C screen  Completed    HIV screen  Completed    Hepatitis A vaccine  Aged Out    Hib vaccine  Aged Out    Meningococcal (ACWY) vaccine  Aged Out    Pneumococcal 0-64 years Vaccine  Aged Out       Hemoglobin A1C (%)   Date Value   12/06/2019 7.1 (H)   04/24/2019 7.0 (H)   01/18/2019 7.5 (H)             ( goal A1C is < 7)   Microalb/Crt.  Ratio (mcg/mg creat)   Date Value   11/11/2019 CANNOT BE CALCULATED     LDL Cholesterol (mg/dL)   Date Value   12/06/2019 48   01/18/2019 34 (L)     LDL Calculated (mg/dL)   Date Value   04/13/2015 32   07/22/2014 57       (goal LDL is <100)   AST (U/L)   Date Value   12/06/2019 36 (H)     ALT (U/L)   Date Value   12/06/2019 35 (H)     BUN (mg/dL)   Date Value   12/06/2019 16     BP Readings from Last 3 Encounters:   07/14/20 107/68   07/07/20 120/80   11/11/19 121/88          (goal 120/80)    All Future Testing planned in [facility]  Lab Frequency Next Occurrence   (Gxt) Stress Test Exercise W Out Myoview Once 02/17/2021               Patient Active Problem List:     Hypertension     Type 2 diabetes mellitus with diabetic polyneuropathy, without long-term current use of insulin (HCC)     Hypothyroidism     Hyperlipidemia Gastroesophageal reflux disease     Diabetic polyneuropathy associated with type 2 diabetes mellitus (HCC)     Chronic gout of multiple sites

## 2020-11-25 NOTE — TELEPHONE ENCOUNTER
There is a discrepancy with this script and the last.  Dosage and quantities are not the same. Please clarify which is correct.

## 2020-11-27 RX ORDER — METFORMIN HYDROCHLORIDE 500 MG/1
1500 TABLET, EXTENDED RELEASE ORAL
Qty: 90 TABLET | Refills: 5 | Status: SHIPPED | OUTPATIENT
Start: 2020-11-27 | End: 2021-05-26 | Stop reason: SDUPTHER

## 2020-12-28 RX ORDER — PANTOPRAZOLE SODIUM 40 MG/1
40 TABLET, DELAYED RELEASE ORAL DAILY
Qty: 30 TABLET | Refills: 5 | Status: SHIPPED | OUTPATIENT
Start: 2020-12-28 | End: 2021-03-18 | Stop reason: SDUPTHER

## 2020-12-28 RX ORDER — ATORVASTATIN CALCIUM 20 MG/1
20 TABLET, FILM COATED ORAL DAILY
Qty: 30 TABLET | Refills: 5 | Status: SHIPPED | OUTPATIENT
Start: 2020-12-28 | End: 2021-03-18 | Stop reason: SDUPTHER

## 2020-12-28 RX ORDER — LOSARTAN POTASSIUM AND HYDROCHLOROTHIAZIDE 25; 100 MG/1; MG/1
1 TABLET ORAL DAILY
Qty: 30 TABLET | Refills: 5 | Status: SHIPPED | OUTPATIENT
Start: 2020-12-28 | End: 2021-06-22 | Stop reason: SDUPTHER

## 2020-12-28 NOTE — TELEPHONE ENCOUNTER
LOV 11-11-19  LRF 7-1-20    Health Maintenance   Topic Date Due    Colon Cancer Screen FIT/FOBT  04/29/2020    Flu vaccine (1) 09/01/2020    Diabetic microalbuminuria test  11/11/2020    Cervical cancer screen  11/17/2020    A1C test (Diabetic or Prediabetic)  12/06/2020    TSH testing  12/06/2020    Potassium monitoring  12/06/2020    Creatinine monitoring  12/06/2020    Lipid screen  12/06/2020    Diabetic foot exam  07/07/2021 (Originally 9/17/2019)    Shingles Vaccine (1 of 2) 07/07/2021 (Originally 12/25/2005)    Diabetic retinal exam  07/17/2021 (Originally 2/6/2020)    Breast cancer screen  04/24/2021    Pneumococcal 65+ years Vaccine (1 of 1 - PPSV23) 11/11/2024    DEXA (modify frequency per FRAX score)  Completed    Hepatitis C screen  Completed    HIV screen  Completed    Hepatitis A vaccine  Aged Out    Hib vaccine  Aged Out    Meningococcal (ACWY) vaccine  Aged Out             (applicable per patient's age: Cancer Screenings, Depression Screening, Fall Risk Screening, Immunizations)    Hemoglobin A1C (%)   Date Value   12/06/2019 7.1 (H)   04/24/2019 7.0 (H)   01/18/2019 7.5 (H)     Microalb/Crt.  Ratio (mcg/mg creat)   Date Value   11/11/2019 CANNOT BE CALCULATED     LDL Cholesterol (mg/dL)   Date Value   12/06/2019 48     LDL Calculated (mg/dL)   Date Value   04/13/2015 32     AST (U/L)   Date Value   12/06/2019 36 (H)     ALT (U/L)   Date Value   12/06/2019 35 (H)     BUN (mg/dL)   Date Value   12/06/2019 16      (goal A1C is < 7)   (goal LDL is <100) need 30-50% reduction from baseline     BP Readings from Last 3 Encounters:   07/14/20 107/68   07/07/20 120/80   11/11/19 121/88    (goal /80)      All Future Testing planned in [facility]:  Lab Frequency Next Occurrence   (Gxt) Stress Test Exercise W Out Myoview Once 02/17/2021       Next Visit Date:  Future Appointments   Date Time Provider Kati White   1/29/2021 11:20 AM Violeta Collier MD [id number] [address]

## 2021-01-07 ENCOUNTER — TELEPHONE (OUTPATIENT)
Dept: FAMILY MEDICINE CLINIC | Age: 66
End: 2021-01-07

## 2021-01-27 ENCOUNTER — TELEPHONE (OUTPATIENT)
Dept: FAMILY MEDICINE CLINIC | Age: 66
End: 2021-01-27

## 2021-01-27 DIAGNOSIS — E11.69 DIABETES MELLITUS TYPE 2 IN OBESE (HCC): ICD-10-CM

## 2021-01-27 DIAGNOSIS — E66.9 DIABETES MELLITUS TYPE 2 IN OBESE (HCC): ICD-10-CM

## 2021-01-27 DIAGNOSIS — M10.9 ACUTE GOUT OF LEFT FOOT, UNSPECIFIED CAUSE: ICD-10-CM

## 2021-01-27 DIAGNOSIS — E78.5 HYPERLIPIDEMIA, UNSPECIFIED HYPERLIPIDEMIA TYPE: Primary | ICD-10-CM

## 2021-01-27 DIAGNOSIS — I10 ESSENTIAL HYPERTENSION: ICD-10-CM

## 2021-01-27 DIAGNOSIS — E03.9 HYPOTHYROIDISM, UNSPECIFIED TYPE: ICD-10-CM

## 2021-01-27 NOTE — TELEPHONE ENCOUNTER
Patient has a appointment on Friday and wishes to have lab work prior.      Orders Pended for editing and approval.

## 2021-01-29 ENCOUNTER — HOSPITAL ENCOUNTER (OUTPATIENT)
Age: 66
Setting detail: SPECIMEN
Discharge: HOME | End: 2021-01-29
Payer: MEDICARE

## 2021-01-29 ENCOUNTER — OFFICE VISIT (OUTPATIENT)
Dept: FAMILY MEDICINE CLINIC | Age: 66
End: 2021-01-29
Payer: MEDICARE

## 2021-01-29 VITALS
SYSTOLIC BLOOD PRESSURE: 122 MMHG | OXYGEN SATURATION: 98 % | WEIGHT: 177 LBS | RESPIRATION RATE: 16 BRPM | TEMPERATURE: 97.9 F | BODY MASS INDEX: 31.36 KG/M2 | DIASTOLIC BLOOD PRESSURE: 84 MMHG | HEIGHT: 63 IN | HEART RATE: 56 BPM

## 2021-01-29 DIAGNOSIS — I10 ESSENTIAL HYPERTENSION: ICD-10-CM

## 2021-01-29 DIAGNOSIS — E11.69 DIABETES MELLITUS TYPE 2 IN OBESE (HCC): ICD-10-CM

## 2021-01-29 DIAGNOSIS — E66.9 DIABETES MELLITUS TYPE 2 IN OBESE (HCC): ICD-10-CM

## 2021-01-29 DIAGNOSIS — M10.9 ACUTE GOUT OF LEFT FOOT, UNSPECIFIED CAUSE: ICD-10-CM

## 2021-01-29 DIAGNOSIS — R14.2 BELCHING: ICD-10-CM

## 2021-01-29 DIAGNOSIS — N90.89 VULVAR IRRITATION: ICD-10-CM

## 2021-01-29 DIAGNOSIS — E03.9 HYPOTHYROIDISM, UNSPECIFIED TYPE: ICD-10-CM

## 2021-01-29 DIAGNOSIS — Z23 NEED FOR PNEUMOCOCCAL VACCINATION: ICD-10-CM

## 2021-01-29 DIAGNOSIS — F41.9 ANXIETY: ICD-10-CM

## 2021-01-29 DIAGNOSIS — K21.9 GASTROESOPHAGEAL REFLUX DISEASE, UNSPECIFIED WHETHER ESOPHAGITIS PRESENT: ICD-10-CM

## 2021-01-29 DIAGNOSIS — E11.42 DIABETIC POLYNEUROPATHY ASSOCIATED WITH TYPE 2 DIABETES MELLITUS (HCC): ICD-10-CM

## 2021-01-29 DIAGNOSIS — E78.5 HYPERLIPIDEMIA, UNSPECIFIED HYPERLIPIDEMIA TYPE: ICD-10-CM

## 2021-01-29 DIAGNOSIS — R05.3 CHRONIC COUGH: ICD-10-CM

## 2021-01-29 DIAGNOSIS — Z12.31 ENCOUNTER FOR SCREENING MAMMOGRAM FOR BREAST CANCER: ICD-10-CM

## 2021-01-29 DIAGNOSIS — E11.42 TYPE 2 DIABETES MELLITUS WITH DIABETIC POLYNEUROPATHY, WITHOUT LONG-TERM CURRENT USE OF INSULIN (HCC): Primary | ICD-10-CM

## 2021-01-29 DIAGNOSIS — N95.2 ATROPHIC VAGINITIS: ICD-10-CM

## 2021-01-29 DIAGNOSIS — M1A.09X0 CHRONIC GOUT OF MULTIPLE SITES, UNSPECIFIED CAUSE: ICD-10-CM

## 2021-01-29 DIAGNOSIS — Z51.81 THERAPEUTIC DRUG MONITORING: ICD-10-CM

## 2021-01-29 LAB
ALBUMIN SERPL-MCNC: 4.4 G/DL (ref 3.5–5.2)
ALBUMIN/GLOBULIN RATIO: 1.6 (ref 1–2.5)
ALCOHOL URINE: NEGATIVE
ALP BLD-CCNC: 63 U/L (ref 35–104)
ALT SERPL-CCNC: 51 U/L (ref 5–33)
AMPHETAMINE SCREEN, URINE: NEGATIVE
ANION GAP SERPL CALCULATED.3IONS-SCNC: 14 MMOL/L (ref 9–17)
AST SERPL-CCNC: 47 U/L
BARBITURATE SCREEN, URINE: NEGATIVE
BENZODIAZEPINE SCREEN, URINE: NEGATIVE
BILIRUB SERPL-MCNC: 0.49 MG/DL (ref 0.3–1.2)
BUN BLDV-MCNC: 17 MG/DL (ref 8–23)
BUN/CREAT BLD: ABNORMAL (ref 9–20)
BUPRENORPHINE URINE: NEGATIVE
CALCIUM SERPL-MCNC: 10 MG/DL (ref 8.6–10.4)
CHLORIDE BLD-SCNC: 99 MMOL/L (ref 98–107)
CHOLESTEROL, FASTING: 128 MG/DL
CHOLESTEROL/HDL RATIO: 3.2
CO2: 23 MMOL/L (ref 20–31)
COCAINE METABOLITE SCREEN URINE: NEGATIVE
CREAT SERPL-MCNC: 1.07 MG/DL (ref 0.5–0.9)
CREATININE URINE: 97.5 MG/DL (ref 28–217)
ESTIMATED AVERAGE GLUCOSE: 143 MG/DL
FENTANYL SCREEN, URINE: NEGATIVE
GABAPENTIN SCREEN, URINE: NEGATIVE
GFR AFRICAN AMERICAN: >60 ML/MIN
GFR NON-AFRICAN AMERICAN: 51 ML/MIN
GFR SERPL CREATININE-BSD FRML MDRD: ABNORMAL ML/MIN/{1.73_M2}
GFR SERPL CREATININE-BSD FRML MDRD: ABNORMAL ML/MIN/{1.73_M2}
GLUCOSE FASTING: 113 MG/DL (ref 70–99)
HBA1C MFR BLD: 6.6 % (ref 4–6)
HCT VFR BLD CALC: 43.2 % (ref 36.3–47.1)
HDLC SERPL-MCNC: 40 MG/DL
HEMOGLOBIN: 14 G/DL (ref 11.9–15.1)
LDL CHOLESTEROL: 38 MG/DL (ref 0–130)
MCH RBC QN AUTO: 28 PG (ref 25.2–33.5)
MCHC RBC AUTO-ENTMCNC: 32.4 G/DL (ref 28.4–34.8)
MCV RBC AUTO: 86.4 FL (ref 82.6–102.9)
MDMA URINE: NEGATIVE
METHADONE SCREEN, URINE: NEGATIVE
METHAMPHETAMINE, URINE: NEGATIVE
MICROALBUMIN/CREAT 24H UR: <12 MG/L
MICROALBUMIN/CREAT UR-RTO: NORMAL MCG/MG CREAT
NRBC AUTOMATED: 0 PER 100 WBC
OPIATE SCREEN URINE: NEGATIVE
OXYCODONE SCREEN URINE: NEGATIVE
PDW BLD-RTO: 12.7 % (ref 11.8–14.4)
PHENCYCLIDINE SCREEN URINE: NEGATIVE
PLATELET # BLD: 292 K/UL (ref 138–453)
PMV BLD AUTO: 11.1 FL (ref 8.1–13.5)
POTASSIUM SERPL-SCNC: 3.7 MMOL/L (ref 3.7–5.3)
PROPOXYPHENE SCREEN, URINE: NEGATIVE
RBC # BLD: 5 M/UL (ref 3.95–5.11)
SODIUM BLD-SCNC: 136 MMOL/L (ref 135–144)
SYNTHETIC CANNABINOIDS(K2) SCREEN, URINE: NEGATIVE
THC SCREEN, URINE: NEGATIVE
THYROXINE, FREE: 1.9 NG/DL (ref 0.93–1.7)
TOTAL PROTEIN: 7.1 G/DL (ref 6.4–8.3)
TRAMADOL SCREEN URINE: NEGATIVE
TRICYCLIC ANTIDEPRESSANTS, UR: NEGATIVE
TRIGLYCERIDE, FASTING: 252 MG/DL
URIC ACID: 7.7 MG/DL (ref 2.4–5.7)
VLDLC SERPL CALC-MCNC: ABNORMAL MG/DL (ref 1–30)
WBC # BLD: 7.6 K/UL (ref 3.5–11.3)

## 2021-01-29 PROCEDURE — 80305 DRUG TEST PRSMV DIR OPT OBS: CPT | Performed by: PEDIATRICS

## 2021-01-29 PROCEDURE — 2022F DILAT RTA XM EVC RTNOPTHY: CPT | Performed by: PEDIATRICS

## 2021-01-29 PROCEDURE — 3044F HG A1C LEVEL LT 7.0%: CPT | Performed by: PEDIATRICS

## 2021-01-29 PROCEDURE — 3017F COLORECTAL CA SCREEN DOC REV: CPT | Performed by: PEDIATRICS

## 2021-01-29 PROCEDURE — G8427 DOCREV CUR MEDS BY ELIG CLIN: HCPCS | Performed by: PEDIATRICS

## 2021-01-29 PROCEDURE — 1090F PRES/ABSN URINE INCON ASSESS: CPT | Performed by: PEDIATRICS

## 2021-01-29 PROCEDURE — G0009 ADMIN PNEUMOCOCCAL VACCINE: HCPCS | Performed by: PEDIATRICS

## 2021-01-29 PROCEDURE — 90670 PCV13 VACCINE IM: CPT | Performed by: PEDIATRICS

## 2021-01-29 PROCEDURE — G8417 CALC BMI ABV UP PARAM F/U: HCPCS | Performed by: PEDIATRICS

## 2021-01-29 PROCEDURE — G8399 PT W/DXA RESULTS DOCUMENT: HCPCS | Performed by: PEDIATRICS

## 2021-01-29 PROCEDURE — 4040F PNEUMOC VAC/ADMIN/RCVD: CPT | Performed by: PEDIATRICS

## 2021-01-29 PROCEDURE — 1036F TOBACCO NON-USER: CPT | Performed by: PEDIATRICS

## 2021-01-29 PROCEDURE — 1123F ACP DISCUSS/DSCN MKR DOCD: CPT | Performed by: PEDIATRICS

## 2021-01-29 PROCEDURE — 99214 OFFICE O/P EST MOD 30 MIN: CPT | Performed by: PEDIATRICS

## 2021-01-29 PROCEDURE — G8482 FLU IMMUNIZE ORDER/ADMIN: HCPCS | Performed by: PEDIATRICS

## 2021-01-29 RX ORDER — CLOBETASOL PROPIONATE 0.5 MG/G
OINTMENT TOPICAL
Qty: 60 G | Refills: 1 | Status: SHIPPED | OUTPATIENT
Start: 2021-01-29 | End: 2021-02-04

## 2021-01-29 RX ORDER — LORAZEPAM 0.5 MG/1
0.5 TABLET ORAL EVERY 8 HOURS PRN
Qty: 30 TABLET | Refills: 0 | Status: SHIPPED | OUTPATIENT
Start: 2021-01-29 | End: 2022-01-05 | Stop reason: SDUPTHER

## 2021-01-29 ASSESSMENT — PATIENT HEALTH QUESTIONNAIRE - PHQ9
SUM OF ALL RESPONSES TO PHQ QUESTIONS 1-9: 0
SUM OF ALL RESPONSES TO PHQ QUESTIONS 1-9: 0
2. FEELING DOWN, DEPRESSED OR HOPELESS: 0
SUM OF ALL RESPONSES TO PHQ9 QUESTIONS 1 & 2: 0

## 2021-01-29 NOTE — PROGRESS NOTES
2021    Cortez Alonzo (:  1955) is a 72 y.o. female, here for a preventive medicine evaluation. Patient presents today for routine follow-up of her chronic medical problems which include diabetes type 2 with neuropathy, hypertension, hyperlipidemia, hypothyroidism, GERD, gout, seasonal allergies and chronic cough. She states that she has been checking her blood sugars usually once weekly. Her last blood sugar was 152 after eating. She does get occasional blood sugars over 200. She does continue to take Metformin 4 tabs once daily and she is tolerating this well. She does complain of some numbness and tingling in her feet/toes which she does think might be secondary to Colcrys. I did tell her that this most likely some neuropathy but we could try discontinuing the Colcrys to see if this would improve. She actually has been walking a lot and has lost about 10 pounds in the last year. Her blood pressure is well controlled with Coreg and losartan/hydrochlorothiazide. She was previously treated with metoprolol but this was changed to Coreg by her cardiologist.  He has wanted her to have a sleep study in the past however this was not covered by her insurance so she did not have this done. She does see her cardiologist, Dr. Jeremy Cloud regularly. For her hyperlipidemia she does take a atorvastatin she is tolerating this well without side effects. She does continue to take Synthroid for her hypothyroidism and she is doing well with this. She does continue to take pantoprazole for her GERD. She states that her GERD has been really bad and she continues to have belching. She also continues to have a chronic cough that she has had for some time. This has been suspected to be secondary to some allergies or with worsening GERD. Because of her history of GERD and continued chronic cough it is recommended that she see GI. This was previously recommended but she did not see them.   She is also due for screening colonoscopy as well. She does have a history of chronic gout for which she uses Colcrys. She thinks this is causing the numbness in her toes she would like to discontinue it. She is not had any flareups of gout recently. Does have a history of anxiety and uses Ativan as needed for severe episodes. She would like a refill of this. She also has a history of occasional vulvar irritation from atrophic vaginitis and uses clobetasol as needed. She does need refill of this. She has no other concerns at this time. cmw        Patient Active Problem List   Diagnosis    Hypertension    Type 2 diabetes mellitus with diabetic polyneuropathy, without long-term current use of insulin ([address]    Hypothyroidism    Hyperlipidemia    Gastroesophageal reflux disease    Diabetic polyneuropathy associated with type 2 diabetes mellitus (HCC)    Chronic gout of multiple sites       Review of Systems   Constitutional: Negative for appetite change, chills, fatigue and fever. HENT: Negative for congestion, ear pain, postnasal drip, rhinorrhea, sore throat and voice change. Some occasional allergy symptoms   Eyes: Negative for pain, discharge, redness and visual disturbance. Respiratory: Positive for cough. Negative for chest tightness, shortness of breath and wheezing. Cardiovascular: Negative for chest pain, palpitations and leg swelling. Gastrointestinal: Negative for abdominal pain, constipation, diarrhea and vomiting. GERD not well controlled. Still with a lot of acid reflux, belching and coughing. Endocrine: Negative for polydipsia, polyphagia and polyuria. Genitourinary: Negative for decreased urine volume, dysuria, frequency, hematuria and urgency. Musculoskeletal: Positive for arthralgias. Negative for myalgias and neck pain. Skin: Negative for color change and rash. Allergic/Immunologic: Negative for immunocompromised state. Neurological: Positive for numbness. Negative for dizziness, weakness and headaches. Hematological: Negative for adenopathy. Does not bruise/bleed easily. Psychiatric/Behavioral: Negative for dysphoric mood and sleep disturbance. The patient is nervous/anxious. Prior to Visit Medications    Medication Sig Taking? Authorizing Provider   LORazepam (ATIVAN) 0.5 MG tablet Take 1 tablet by mouth every 8 hours as needed for Anxiety for up to 30 days. Yes Kaylene Marinelli MD   atorvastatin (LIPITOR) 20 MG tablet Take 1 tablet by mouth daily Yes DORINA De CNP   pantoprazole (PROTONIX) 40 MG tablet Take 1 tablet by mouth daily DO NOT CRUSH OR BREAK Yes DORINA De CNP   losartan-hydroCHLOROthiazide (HYZAAR) 100-25 MG per tablet Take 1 tablet by mouth daily Yes DORINA De CNP   metFORMIN (GLUCOPHAGE-XR) 500 MG extended release tablet Take 3 tablets by mouth daily (with breakfast) Yes Kaylene Marinelli MD   SYNTHROID 150 MCG tablet Take 1 tablet by mouth Daily Yes Kaylene Marinelli MD   colchicine (COLCRYS) 0.6 MG tablet Take 1 tablet by mouth daily as needed (pain) Yes Kaylene Marinelli MD   carvedilol (COREG) 25 MG tablet Take 25 mg by mouth 2 times daily Yes Historical Provider, MD   aspirin 81 MG EC tablet Take 81 mg by mouth daily. Yes Historical Provider, MD   docusate sodium (COLACE) 100 MG capsule Take 100 mg by mouth daily. Yes Historical Provider, MD   clobetasol (TEMOVATE) 0.05 % ointment Apply a think layer topically 1 time daily. Kaylene Marinelli MD   colchicine (COLCRYS) 0.6 MG tablet Take 1 tablet by mouth daily  Kaylene Marinelli MD   glucose blood VI test strips ([employer] BLOOD GLUCOSE TEST) strip Test blood sugar once daily.  Dx: Diabetes mellitus type 2 in obese 250.00, 278.00  Kaylene Marinelli MD        Allergies   Allergen Reactions    Sulfa Antibiotics Hives    Tetanus Toxoids Hives       Past Medical History:   Diagnosis Date    No erythema or rash. Neurological:      Mental Status: She is alert and oriented to person, place, and time. Cranial Nerves: No cranial nerve deficit. Motor: No abnormal muscle tone. Coordination: Coordination normal.      Deep Tendon Reflexes: Reflexes are normal and symmetric. Psychiatric:         Behavior: Behavior normal.         Thought Content: Thought content normal.         Judgment: Judgment normal.         No flowsheet data found. Lab Results   Component Value Date    CHOL 129 12/06/2019    CHOL 112 01/18/2019    CHOL 114 09/17/2018    CHOL 141 04/13/2015    CHOL 141 07/22/2014    CHOL 140 03/05/2013    CHOLFAST 128 01/29/2021    TRIG 225 12/06/2019    TRIG 196 01/18/2019    TRIG 223 09/17/2018    TRIGLYCFAST 252 01/29/2021    HDL 40 01/29/2021    HDL 36 12/06/2019    HDL 39 01/18/2019    LDLCHOLESTEROL 38 01/29/2021    LDLCHOLESTEROL 48 12/06/2019    LDLCHOLESTEROL 34 01/18/2019    LDLCALC 32 04/13/2015    LDLCALC 57 07/22/2014    LDLCALC 51 03/05/2013    GLUF 113 01/29/2021    GLUCOSE 108 12/06/2019    GLUCOSE 106 01/18/2019    LABA1C 6.6 01/29/2021    LABA1C 7.1 12/06/2019    LABA1C 7.0 04/24/2019       The ASCVD Risk score (Ryan Romero., et al., 2013) failed to calculate for the following reasons:     The valid total cholesterol range is 130 to 320 mg/dL    Immunization History   Administered Date(s) Administered    Influenza, Quadv, IM, (6 mo and older Fluzone, Flulaval, Fluarix and 3 yrs and older Afluria) 11/17/2017, 09/17/2018    Influenza, Quadv, IM, PF (6 mo and older Fluzone, Flulaval, Fluarix, and 3 yrs and older Afluria) 11/11/2019    Influenza, Quadv, Recombinant, IM PF (Flublok 18 yrs and older) 11/07/2020    Pneumococcal Conjugate 13-valent ([id number]) 01/29/2021    Pneumococcal Polysaccharide ([id number]) 11/11/2019       Health Maintenance   Topic Date Due    Colon Cancer Screen FIT/FOBT  04/29/2020    Cervical cancer screen  11/17/2020    TSH testing 12/06/2020    Annual Wellness Visit (AWV)  01/29/2021    Diabetic foot exam  07/07/2021 (Originally 9/17/2019)    Shingles Vaccine (1 of 2) 07/07/2021 (Originally 12/25/2005)    Diabetic retinal exam  07/17/2021 (Originally 2/6/2020)    Breast cancer screen  04/24/2021    A1C test (Diabetic or Prediabetic)  01/29/2022    Diabetic microalbuminuria test  01/29/2022    Lipid screen  01/29/2022    Potassium monitoring  01/29/2022    Creatinine monitoring  01/29/2022    Pneumococcal 65+ years Vaccine (2 of 2 - PPSV23) 11/11/2024    DEXA (modify frequency per FRAX score)  Completed    Flu vaccine  Completed    Hepatitis C screen  Completed    HIV screen  Completed    Hepatitis A vaccine  Aged Out    Hib vaccine  Aged Out    Meningococcal (ACWY) vaccine  Aged Out       ASSESSMENT/PLAN:    1. Type 2 diabetes mellitus with diabetic polyneuropathy, without long-term current use of insulin ([facility]  2. Diabetic polyneuropathy associated with type 2 diabetes mellitus ([facility]  3. Essential hypertension  4. Hyperlipidemia, unspecified hyperlipidemia type  5. Hypothyroidism, unspecified type  6. Gastroesophageal reflux disease, unspecified whether esophagitis present  -     External Referral To Gastroenterology  7. Chronic cough  -     External Referral To Gastroenterology  8. Belching  -     External Referral To Gastroenterology  9. Chronic gout of multiple sites, unspecified cause  10. Anxiety  -     POCT Rapid Drug Screen  -     LORazepam (ATIVAN) 0.5 MG tablet; Take 1 tablet by mouth every 8 hours as needed for Anxiety for up to 30 days. , Disp-30 tablet, R-0Normal  11. Therapeutic drug monitoring  -     POCT Rapid Drug Screen  12. Vulvar irritation  13. Atrophic vaginitis  14. Need for pneumococcal vaccination  -     PREVNAR 13 IM (Pneumococcal conjugate vaccine 13-valent)  15. Encounter for screening mammogram for breast cancer  -     KANDY DIGITAL SCREEN W OR WO CAD BILATERAL;  Future      Proceed with continue same medications  Obtain labs as ordered  Strict diabetic diet and regular exercise encouraged  Weight reduction encouraged  Yearly eye and foot exams recommended  Follow-up with cardiology as scheduled  GI consult for worsening GERD, chronic cough, belching and colon cancer screening  Discontinue Colcrys and monitor for gout symptoms  Obtain POCT urine drug screen - negative   Refill Ativan to use as needed  Clobetasol to use as needed  Prevnar 13 today  Obtain mammogram  Schedule AWV with pap  Call with concerns        Return in about 3 months (around 4/29/2021) for routine follow up. An electronic signature was used to authenticate this note.     --Jose Grant MD on 2/7/2021 at 9:34 AM

## 2021-02-04 DIAGNOSIS — E03.9 HYPOTHYROIDISM, UNSPECIFIED TYPE: Primary | ICD-10-CM

## 2021-02-04 DIAGNOSIS — R79.89 ELEVATED LIVER FUNCTION TESTS: ICD-10-CM

## 2021-02-04 DIAGNOSIS — N95.2 ATROPHIC VAGINITIS: ICD-10-CM

## 2021-02-04 DIAGNOSIS — E11.42 TYPE 2 DIABETES MELLITUS WITH DIABETIC POLYNEUROPATHY, WITHOUT LONG-TERM CURRENT USE OF INSULIN (HCC): ICD-10-CM

## 2021-02-04 DIAGNOSIS — E78.5 HYPERLIPIDEMIA, UNSPECIFIED HYPERLIPIDEMIA TYPE: ICD-10-CM

## 2021-02-04 DIAGNOSIS — N90.89 VULVAR IRRITATION: ICD-10-CM

## 2021-02-04 DIAGNOSIS — R74.8 ELEVATED CREATINE KINASE: ICD-10-CM

## 2021-02-04 DIAGNOSIS — M1A.09X0 CHRONIC GOUT OF MULTIPLE SITES, UNSPECIFIED CAUSE: ICD-10-CM

## 2021-02-04 RX ORDER — CLOBETASOL PROPIONATE 0.5 MG/G
OINTMENT TOPICAL
Qty: 60 G | Refills: 1 | Status: SHIPPED | OUTPATIENT
Start: 2021-02-04 | End: 2022-07-21 | Stop reason: SDUPTHER

## 2021-02-07 ASSESSMENT — ENCOUNTER SYMPTOMS
ABDOMINAL PAIN: 0
COUGH: 1
RHINORRHEA: 0
CONSTIPATION: 0
VOMITING: 0
EYE PAIN: 0
WHEEZING: 0
SORE THROAT: 0
EYE REDNESS: 0
DIARRHEA: 0
EYE DISCHARGE: 0
SHORTNESS OF BREATH: 0
VOICE CHANGE: 0
CHEST TIGHTNESS: 0
COLOR CHANGE: 0

## 2021-02-10 ENCOUNTER — HOSPITAL ENCOUNTER (OUTPATIENT)
Age: 66
Setting detail: SPECIMEN
Discharge: HOME | End: 2021-02-10
Payer: MEDICARE

## 2021-02-10 DIAGNOSIS — E03.9 HYPOTHYROIDISM, UNSPECIFIED TYPE: ICD-10-CM

## 2021-02-10 LAB — TSH SERPL DL<=0.05 MIU/L-ACNC: 0.61 MIU/L (ref 0.3–5)

## 2021-03-18 DIAGNOSIS — K21.9 GASTROESOPHAGEAL REFLUX DISEASE: ICD-10-CM

## 2021-03-18 DIAGNOSIS — E78.5 HYPERLIPIDEMIA, UNSPECIFIED HYPERLIPIDEMIA TYPE: ICD-10-CM

## 2021-03-18 RX ORDER — ATORVASTATIN CALCIUM 20 MG/1
20 TABLET, FILM COATED ORAL DAILY
Qty: 90 TABLET | Refills: 1 | Status: SHIPPED | OUTPATIENT
Start: 2021-03-18 | End: 2021-11-03

## 2021-03-18 RX ORDER — PANTOPRAZOLE SODIUM 40 MG/1
40 TABLET, DELAYED RELEASE ORAL DAILY
Qty: 90 TABLET | Refills: 1 | Status: SHIPPED | OUTPATIENT
Start: 2021-03-18 | End: 2021-11-03

## 2021-03-18 NOTE — TELEPHONE ENCOUNTER
[facility] [place]:35-  O:90-    Health Maintenance   Topic Date Due    COVID-19 Vaccine (1) Never done    Colon Cancer Screen FIT/FOBT  04/29/2020    Cervical cancer screen  11/17/2020    Annual Wellness Visit (AWV)  Never done    Diabetic foot exam  07/07/2021 (Originally 9/17/2019)    Shingles Vaccine (1 of 2) 07/07/2021 (Originally 12/25/2005)    Diabetic retinal exam  07/17/2021 (Originally 2/6/2020)    Breast cancer screen  04/24/2021    A1C test (Diabetic or Prediabetic)  01/29/2022    Diabetic microalbuminuria test  01/29/2022    Lipid screen  01/29/2022    Potassium monitoring  01/29/2022    Creatinine monitoring  01/29/2022    TSH testing  02/10/2022    Pneumococcal 65+ years Vaccine (2 of 2 - PPSV23) 11/11/2024    DEXA (modify frequency per FRAX score)  Completed    Flu vaccine  Completed    Hepatitis C screen  Completed    HIV screen  Completed    Hepatitis A vaccine  Aged Out    Hib vaccine  Aged Out    Meningococcal (ACWY) vaccine  Aged Out             (applicable per patient's age: Cancer Screenings, Depression Screening, Fall Risk Screening, Immunizations)    Hemoglobin A1C (%)   Date Value   01/29/2021 6.6 (H)   12/06/2019 7.1 (H)   04/24/2019 7.0 (H)     Microalb/Crt.  Ratio (mcg/mg creat)   Date Value   01/29/2021 CANNOT BE CALCULATED     LDL Cholesterol (mg/dL)   Date Value   01/29/2021 38     LDL Calculated (mg/dL)   Date Value   04/13/2015 32     AST (U/L)   Date Value   01/29/2021 47 (H)     ALT (U/L)   Date Value   01/29/2021 51 (H)     BUN (mg/dL)   Date Value   01/29/2021 17      (goal A1C is < 7)   (goal LDL is <100) need 30-50% reduction from baseline     BP Readings from Last 3 Encounters:   01/29/21 122/84   07/14/20 107/68   07/07/20 120/80    (goal /80)      All Future Testing planned in [facility]:  Lab Frequency Next Occurrence   KANDY DIGITAL SCREEN W OR WO CAD BILATERAL Once 01/29/2021   Comprehensive Metabolic Panel Once 85/15/6306   Hemoglobin A1C Once 05/05/2021   TSH without Reflex Once 05/05/2021   T4, Free Once 05/05/2021   Uric Acid Once 05/05/2021   US RENAL COMPLETE Once 02/11/2021   US LIVER Once 02/11/2021       Next Visit Date:  Future Appointments   Date Time Provider Kati White   3/24/2021 10:00 AM STA [place] OP US ST[facility] HEENA US STA [place]   3/24/2021 10:45 AM STA [place] OP US STAZ [facility]EENA US STA [place]   3/24/2021 11:30 AM STA [place] OP MAMMO RM STAZ HEENA KANDY STA [place]   5/26/2021  9:20 AM Nadia Valderrama MD [facility] [address] [facility]            Patient Active Problem List:     Hypertension     Type 2 diabetes mellitus with diabetic polyneuropathy, without long-term current use of insulin ([address]     Hypothyroidism     Hyperlipidemia     Gastroesophageal reflux disease     Diabetic polyneuropathy associated with type 2 diabetes mellitus ([address]     Chronic gout of multiple sites

## 2021-03-24 ENCOUNTER — HOSPITAL ENCOUNTER (OUTPATIENT)
Dept: MAMMOGRAPHY | Facility: CLINIC | Age: 66
Discharge: HOME | End: 2021-03-26
Payer: MEDICARE

## 2021-03-24 ENCOUNTER — HOSPITAL ENCOUNTER (OUTPATIENT)
Dept: ULTRASOUND IMAGING | Facility: CLINIC | Age: 66
Discharge: HOME | End: 2021-03-26
Payer: MEDICARE

## 2021-03-24 DIAGNOSIS — Z12.31 ENCOUNTER FOR SCREENING MAMMOGRAM FOR BREAST CANCER: ICD-10-CM

## 2021-03-24 DIAGNOSIS — R74.8 ELEVATED CREATINE KINASE: ICD-10-CM

## 2021-03-24 DIAGNOSIS — R79.89 ELEVATED LIVER FUNCTION TESTS: ICD-10-CM

## 2021-03-24 PROCEDURE — 76705 ECHO EXAM OF ABDOMEN: CPT

## 2021-03-24 PROCEDURE — 76770 US EXAM ABDO BACK WALL COMP: CPT

## 2021-05-05 ENCOUNTER — HOSPITAL ENCOUNTER (OUTPATIENT)
Dept: MAMMOGRAPHY | Facility: CLINIC | Age: 66
Discharge: HOME | End: 2021-05-07
Payer: MEDICARE

## 2021-05-05 ENCOUNTER — HOSPITAL ENCOUNTER (OUTPATIENT)
Age: 66
Setting detail: SPECIMEN
Discharge: HOME | End: 2021-05-05
Payer: MEDICARE

## 2021-05-05 DIAGNOSIS — E78.5 HYPERLIPIDEMIA, UNSPECIFIED HYPERLIPIDEMIA TYPE: ICD-10-CM

## 2021-05-05 DIAGNOSIS — Z12.31 VISIT FOR SCREENING MAMMOGRAM: ICD-10-CM

## 2021-05-05 DIAGNOSIS — E03.9 HYPOTHYROIDISM, UNSPECIFIED TYPE: ICD-10-CM

## 2021-05-05 DIAGNOSIS — E11.42 TYPE 2 DIABETES MELLITUS WITH DIABETIC POLYNEUROPATHY, WITHOUT LONG-TERM CURRENT USE OF INSULIN (HCC): ICD-10-CM

## 2021-05-05 DIAGNOSIS — M1A.09X0 CHRONIC GOUT OF MULTIPLE SITES, UNSPECIFIED CAUSE: ICD-10-CM

## 2021-05-05 LAB
ALBUMIN SERPL-MCNC: 4.6 G/DL (ref 3.5–5.2)
ALBUMIN/GLOBULIN RATIO: 1.6 (ref 1–2.5)
ALP BLD-CCNC: 66 U/L (ref 35–104)
ALT SERPL-CCNC: 44 U/L (ref 5–33)
ANION GAP SERPL CALCULATED.3IONS-SCNC: 14 MMOL/L (ref 9–17)
AST SERPL-CCNC: 46 U/L
BILIRUB SERPL-MCNC: 0.4 MG/DL (ref 0.3–1.2)
BUN BLDV-MCNC: 13 MG/DL (ref 8–23)
BUN/CREAT BLD: ABNORMAL (ref 9–20)
CALCIUM SERPL-MCNC: 10.2 MG/DL (ref 8.6–10.4)
CHLORIDE BLD-SCNC: 99 MMOL/L (ref 98–107)
CO2: 25 MMOL/L (ref 20–31)
CREAT SERPL-MCNC: 0.91 MG/DL (ref 0.5–0.9)
ESTIMATED AVERAGE GLUCOSE: 148 MG/DL
GFR AFRICAN AMERICAN: >60 ML/MIN
GFR NON-AFRICAN AMERICAN: >60 ML/MIN
GFR SERPL CREATININE-BSD FRML MDRD: ABNORMAL ML/MIN/{1.73_M2}
GFR SERPL CREATININE-BSD FRML MDRD: ABNORMAL ML/MIN/{1.73_M2}
GLUCOSE BLD-MCNC: 111 MG/DL (ref 70–99)
HBA1C MFR BLD: 6.8 % (ref 4–6)
POTASSIUM SERPL-SCNC: 3.9 MMOL/L (ref 3.7–5.3)
SODIUM BLD-SCNC: 138 MMOL/L (ref 135–144)
THYROXINE, FREE: 1.88 NG/DL (ref 0.93–1.7)
TOTAL PROTEIN: 7.5 G/DL (ref 6.4–8.3)
TSH SERPL DL<=0.05 MIU/L-ACNC: 0.64 MIU/L (ref 0.3–5)
URIC ACID: 7.9 MG/DL (ref 2.4–5.7)

## 2021-05-05 PROCEDURE — 77067 SCR MAMMO BI INCL CAD: CPT

## 2021-05-14 DIAGNOSIS — E78.5 HYPERLIPIDEMIA, UNSPECIFIED HYPERLIPIDEMIA TYPE: Primary | ICD-10-CM

## 2021-05-14 DIAGNOSIS — E11.42 TYPE 2 DIABETES MELLITUS WITH DIABETIC POLYNEUROPATHY, WITHOUT LONG-TERM CURRENT USE OF INSULIN (HCC): ICD-10-CM

## 2021-05-14 DIAGNOSIS — E03.9 HYPOTHYROIDISM, UNSPECIFIED TYPE: ICD-10-CM

## 2021-05-14 DIAGNOSIS — M1A.09X0 CHRONIC GOUT OF MULTIPLE SITES, UNSPECIFIED CAUSE: ICD-10-CM

## 2021-05-26 ENCOUNTER — OFFICE VISIT (OUTPATIENT)
Dept: FAMILY MEDICINE CLINIC | Age: 66
End: 2021-05-26
Payer: MEDICARE

## 2021-05-26 ENCOUNTER — HOSPITAL ENCOUNTER (OUTPATIENT)
Age: 66
Setting detail: SPECIMEN
Discharge: HOME | End: 2021-05-26
Payer: MEDICARE

## 2021-05-26 VITALS
DIASTOLIC BLOOD PRESSURE: 82 MMHG | HEART RATE: 61 BPM | SYSTOLIC BLOOD PRESSURE: 118 MMHG | TEMPERATURE: 98 F | WEIGHT: 177 LBS | BODY MASS INDEX: 30.86 KG/M2

## 2021-05-26 VITALS
SYSTOLIC BLOOD PRESSURE: 118 MMHG | TEMPERATURE: 98 F | WEIGHT: 177 LBS | DIASTOLIC BLOOD PRESSURE: 82 MMHG | RESPIRATION RATE: 16 BRPM | HEART RATE: 61 BPM | HEIGHT: 64 IN | BODY MASS INDEX: 30.22 KG/M2

## 2021-05-26 DIAGNOSIS — L30.9 DERMATITIS: ICD-10-CM

## 2021-05-26 DIAGNOSIS — E11.42 TYPE 2 DIABETES MELLITUS WITH DIABETIC POLYNEUROPATHY, WITHOUT LONG-TERM CURRENT USE OF INSULIN (HCC): ICD-10-CM

## 2021-05-26 DIAGNOSIS — R14.2 BELCHING: ICD-10-CM

## 2021-05-26 DIAGNOSIS — R05.3 CHRONIC COUGH: ICD-10-CM

## 2021-05-26 DIAGNOSIS — R10.2 PELVIC PAIN: ICD-10-CM

## 2021-05-26 DIAGNOSIS — E03.9 HYPOTHYROIDISM, UNSPECIFIED TYPE: ICD-10-CM

## 2021-05-26 DIAGNOSIS — E11.42 TYPE 2 DIABETES MELLITUS WITH DIABETIC POLYNEUROPATHY, WITHOUT LONG-TERM CURRENT USE OF INSULIN (HCC): Primary | ICD-10-CM

## 2021-05-26 DIAGNOSIS — M1A.09X0 CHRONIC GOUT OF MULTIPLE SITES, UNSPECIFIED CAUSE: ICD-10-CM

## 2021-05-26 DIAGNOSIS — E11.42 DIABETIC POLYNEUROPATHY ASSOCIATED WITH TYPE 2 DIABETES MELLITUS (HCC): ICD-10-CM

## 2021-05-26 DIAGNOSIS — Z00.00 INITIAL MEDICARE ANNUAL WELLNESS VISIT: Primary | ICD-10-CM

## 2021-05-26 DIAGNOSIS — K21.9 GASTROESOPHAGEAL REFLUX DISEASE, UNSPECIFIED WHETHER ESOPHAGITIS PRESENT: ICD-10-CM

## 2021-05-26 DIAGNOSIS — E78.5 HYPERLIPIDEMIA, UNSPECIFIED HYPERLIPIDEMIA TYPE: ICD-10-CM

## 2021-05-26 DIAGNOSIS — K44.9 HIATAL HERNIA: ICD-10-CM

## 2021-05-26 DIAGNOSIS — F41.9 ANXIETY: ICD-10-CM

## 2021-05-26 DIAGNOSIS — K63.5 POLYP OF COLON, UNSPECIFIED PART OF COLON, UNSPECIFIED TYPE: ICD-10-CM

## 2021-05-26 DIAGNOSIS — I10 ESSENTIAL HYPERTENSION: ICD-10-CM

## 2021-05-26 LAB
-: NORMAL
AMORPHOUS: NORMAL
BACTERIA: NORMAL
BILIRUBIN URINE: NEGATIVE
CASTS UA: NORMAL /LPF (ref 0–8)
COLOR: YELLOW
COMMENT UA: ABNORMAL
CRYSTALS, UA: NORMAL /HPF
EPITHELIAL CELLS UA: NORMAL /HPF (ref 0–5)
GLUCOSE URINE: NEGATIVE
KETONES, URINE: NEGATIVE
LEUKOCYTE ESTERASE, URINE: ABNORMAL
MUCUS: NORMAL
NITRITE, URINE: NEGATIVE
OTHER OBSERVATIONS UA: NORMAL
PH UA: 5.5 (ref 5–8)
PROTEIN UA: NEGATIVE
RBC UA: NORMAL /HPF (ref 0–4)
RENAL EPITHELIAL, UA: NORMAL /HPF
SPECIFIC GRAVITY UA: 1.01 (ref 1–1.03)
TRICHOMONAS: NORMAL
TURBIDITY: CLEAR
URINE HGB: NEGATIVE
UROBILINOGEN, URINE: NORMAL
WBC UA: NORMAL /HPF (ref 0–5)
YEAST: NORMAL

## 2021-05-26 PROCEDURE — 99212 OFFICE O/P EST SF 10 MIN: CPT | Performed by: PEDIATRICS

## 2021-05-26 PROCEDURE — G0402 INITIAL PREVENTIVE EXAM: HCPCS | Performed by: PEDIATRICS

## 2021-05-26 PROCEDURE — 4040F PNEUMOC VAC/ADMIN/RCVD: CPT | Performed by: PEDIATRICS

## 2021-05-26 PROCEDURE — 3017F COLORECTAL CA SCREEN DOC REV: CPT | Performed by: PEDIATRICS

## 2021-05-26 PROCEDURE — 1036F TOBACCO NON-USER: CPT | Performed by: PEDIATRICS

## 2021-05-26 PROCEDURE — 99214 OFFICE O/P EST MOD 30 MIN: CPT | Performed by: PEDIATRICS

## 2021-05-26 PROCEDURE — 1123F ACP DISCUSS/DSCN MKR DOCD: CPT | Performed by: PEDIATRICS

## 2021-05-26 PROCEDURE — G8417 CALC BMI ABV UP PARAM F/U: HCPCS | Performed by: PEDIATRICS

## 2021-05-26 PROCEDURE — 3044F HG A1C LEVEL LT 7.0%: CPT | Performed by: PEDIATRICS

## 2021-05-26 PROCEDURE — G8399 PT W/DXA RESULTS DOCUMENT: HCPCS | Performed by: PEDIATRICS

## 2021-05-26 PROCEDURE — 2022F DILAT RTA XM EVC RTNOPTHY: CPT | Performed by: PEDIATRICS

## 2021-05-26 PROCEDURE — 1090F PRES/ABSN URINE INCON ASSESS: CPT | Performed by: PEDIATRICS

## 2021-05-26 PROCEDURE — G8427 DOCREV CUR MEDS BY ELIG CLIN: HCPCS | Performed by: PEDIATRICS

## 2021-05-26 RX ORDER — COLCHICINE 0.6 MG/1
1 TABLET ORAL DAILY
COMMUNITY
Start: 2021-04-26 | End: 2021-05-26 | Stop reason: SDUPTHER

## 2021-05-26 RX ORDER — METFORMIN HYDROCHLORIDE 500 MG/1
2000 TABLET, EXTENDED RELEASE ORAL
Qty: 120 TABLET | Refills: 5 | Status: SHIPPED | OUTPATIENT
Start: 2021-05-26 | End: 2021-11-03

## 2021-05-26 ASSESSMENT — LIFESTYLE VARIABLES
HOW OFTEN DO YOU HAVE SIX OR MORE DRINKS ON ONE OCCASION: 0
HOW MANY STANDARD DRINKS CONTAINING ALCOHOL DO YOU HAVE ON A TYPICAL DAY: 0

## 2021-05-26 ASSESSMENT — PATIENT HEALTH QUESTIONNAIRE - PHQ9: SUM OF ALL RESPONSES TO PHQ QUESTIONS 1-9: 0

## 2021-05-26 ASSESSMENT — ENCOUNTER SYMPTOMS: DIARRHEA: 1

## 2021-05-26 NOTE — PROGRESS NOTES
Herman Pascal (:  1955) is a 72 y.o. female,{New vs Established:[id number]::\"Established patient\"}, here for evaluation of the following chief complaint(s):  Medicare AWV         ASSESSMENT/PLAN:  1. Initial Medicare annual wellness visit  2. Pelvic pain  -     Urinalysis; Future  -     Culture, Urine; Future  -     US PELVIS COMPLETE; Future      Podiatry   deramto      Return for Medicare Annual Wellness Visit in 1 year. 1. Type 2 diabetes mellitus with diabetic polyneuropathy, without long-term current use of insulin ([facility]  2. Diabetic polyneuropathy associated with type 2 diabetes mellitus ([facility]  3. Essential hypertension  4. Hyperlipidemia, unspecified hyperlipidemia type  5. Hypothyroidism, unspecified type  6. Gastroesophageal reflux disease, unspecified whether esophagitis present  -     External Referral To Gastroenterology  7. Chronic cough  -     External Referral To Gastroenterology  8. Belching  -     External Referral To Gastroenterology  9. Chronic gout of multiple sites, unspecified cause  10. Anxiety  -     POCT Rapid Drug Screen  -     LORazepam (ATIVAN) 0.5 MG tablet; Take 1 tablet by mouth every 8 hours as needed for Anxiety for up to 30 days. , Disp-30 tablet, R-0Normal  11. Therapeutic drug monitoring  -     POCT Rapid Drug Screen  12. Vulvar irritation  13. Atrophic vaginitis  14. Need for pneumococcal vaccination  -     PREVNAR 13 IM (Pneumococcal conjugate vaccine 13-valent)  15. Encounter for screening mammogram for breast cancer  -     KANDY DIGITAL SCREEN W OR WO CAD BILATERAL;  Future        Subjective     HPI    Checking sugars twice weekly - 192 after eating / 152 after     Rash all over arms - when she went to ER the doc noticed it   She might be allergic to the dog - she is allergic to cats   Was first on the chest and arms and behind her knees   Tried zyrtec that makes her drowsy   She has been using her steroid ointment that she has   Some OTC hydrocortisone Patient presents today for routine follow-up of her chronic medical problems which include diabetes type 2 with neuropathy, hypertension, hyperlipidemia, hypothyroidism, GERD, gout, seasonal allergies and chronic cough. She states that she has been checking her blood sugars usually once weekly. Her last blood sugar was 152 after eating. She does get occasional blood sugars over 200. She does continue to take Metformin 4 tabs once daily and she is tolerating this well. She does complain of some numbness and tingling in her feet/toes which she does think might be secondary to Colcrys. I did tell her that this most likely some neuropathy but we could try discontinuing the Colcrys to see if this would improve. She actually has been walking a lot and has lost about 10 pounds in the last year. Her blood pressure is well controlled with Coreg and losartan/hydrochlorothiazide. She was previously treated with metoprolol but this was changed to Coreg by her cardiologist.  He has wanted her to have a sleep study in the past however this was not covered by her insurance so she did not have this done. She does see her cardiologist, Dr. Musa Back regularly. For her hyperlipidemia she does take a atorvastatin she is tolerating this well without side effects. She does continue to take Synthroid for her hypothyroidism and she is doing well with this. She does continue to take pantoprazole for her GERD. She states that her GERD has been really bad and she continues to have belching. She also continues to have a chronic cough that she has had for some time. This has been suspected to be secondary to some allergies or with worsening GERD. Because of her history of GERD and continued chronic cough it is recommended that she see GI. This was previously recommended but she did not see them. She is also due for screening colonoscopy as well. She does have a history of chronic gout for which she uses Colcrys.   She thinks this is causing the numbness in her toes she would like to discontinue it. She is not had any flareups of gout recently. Does have a history of anxiety and uses Ativan as needed for severe episodes. She would like a refill of this. She also has a history of occasional vulvar irritation from atrophic vaginitis and uses clobetasol as needed. She does need refill of this. She has no other concerns at this time. cmw    Had symptoms of left foot swelling 1 month before her labs:  Took colcrys   One tablet every day   Went to ER had swelling of the left wrist - could have been gout or tendonitis     Dr. Boykin Neither:   EGD:  Hiatal hernia - stay on protonix and watch what she is eating    GERD not as bad   Cough is still there     Colonoscopy - 4/9 - 3 polyps - 1 was HP and 2 were TA - next colonoscopy in 2026  Mamm UTD - 5/5/21    Feels like something is between her toes   Numbness and tingling of the toes       Review of Systems       Objective      Physical Exam       {Time Documentation Optional:[id number]}      An electronic signature was used to authenticate this note.     --Ryne Goff MD

## 2021-05-26 NOTE — PROGRESS NOTES
Moreno Roy (:  1955) is a 72 y.o. female,Established patient, here for evaluation of the following chief complaint(s):  Diabetes, Hypertension, and Hyperlipidemia         ASSESSMENT/PLAN:    1. Type 2 diabetes mellitus with diabetic polyneuropathy, without long-term current use of insulin ([facility]  2. Diabetic polyneuropathy associated with type 2 diabetes mellitus ([facility]  3. Essential hypertension  4. Hyperlipidemia, unspecified hyperlipidemia type  5. Hypothyroidism, unspecified type  6. Gastroesophageal reflux disease, unspecified whether esophagitis present  7. Belching  8. Hiatal hernia  9. Polyp of colon, unspecified part of colon, unspecified type  10. Chronic gout of multiple sites, unspecified cause  11. Chronic cough  12. Anxiety    Proceed with review of recent labs  Continue current medications  Check blood sugars regularly  Yearly eye and foot exams recommended  Monitor blood pressure occasionally and follow-up with cardiology as scheduled  Continue Synthroid  Continue PPI and follow-up with GI as indicated  Repeat colonoscopy in   ENT consult as discussed and annual wellness visit  Continue Colcrys  Reschedule Pap  Call with concerns      Return in about 3 months (around 2021) for PAP / pelvic and routine follow up. Subjective     HPI    Patient presents today for routine follow-up of her chronic medical problems including diabetes type 2 with neuropathy, hypertension, hyperlipidemia, hypothyroidism, GERD, gout, seasonal allergies and chronic cough. She also has a history of anxiety. She states that she has been checking her blood sugars occasionally about twice weekly. She states that they have still been somewhat elevated around 150-190. She does continue to take Metformin and she is tolerating this well. She does still continue to have some numbness and tingling in her feet and toes.   She previously thought this was secondary to Colcrys but I did tell her that she likely did have some diabetic neuropathy. Her blood pressure is well controlled with Coreg and losartan/hydrochlorothiazide. She was previously treated with metoprolol but this was changed to Coreg by her cardiologist.  He has 100 to have a sleep study in the past however this was not covered by her insurance so she did not have this done. She does follow with her cardiologist,  [facility] regularly. She does have a history of hyperlipidemia for which she takes a atorvastatin and she is tolerating this without side effects. She does have a history of hypothyroidism and is taking Synthroid regularly. She does take pantoprazole for her history of GERD. She does continue to have some symptoms and some belching. She does tell me that she saw GI, Dr. Fer Palacio and did have an EGD which showed a hiatal hernia. He told her to stay on Protonix and watch what she is eating. She states that her GERD is not as bad as before. She does have a chronic cough which is still there. It was recommended that she see ENT for this. She did also have a colonoscopy on 4/9/2021 which showed 3 polyps. 1 was a hyperplastic polyp and tubular tubular adenomas. Her next colonoscopy will be in 2026. She also has a history of chronic gout for which she was previously treated with Colcrys. She has been taking this daily and does report that she did have some symptoms of left foot swelling 1 month before her labs. She did go to the emergency room because she had swelling of her left wrist and was told that this could be gout or tendinitis. She has not been taking Colcrys daily. She does have a history of occasional vulvar irritation from atrophic vaginitis and uses clobetasol as needed. She was scheduled for an annual wellness visit today in which her Pap was supposed to have been done but she declined having this done because she has been having some diarrhea today. Her mammogram is up-to-date as of 5/5/2021.    She does have a history of anxiety and uses Ativan as needed for severe anxiety. She has no other concerns at this time. cmw        Review of Systems   Constitutional: Negative for appetite change, chills, fatigue and fever. HENT: Negative for congestion, ear pain, postnasal drip, rhinorrhea, sore throat and voice change. Some occasional allergy symptoms   Eyes: Negative for pain, discharge, redness and visual disturbance. Respiratory: Positive for cough. Negative for chest tightness, shortness of breath and wheezing. Cardiovascular: Negative for chest pain, palpitations and leg swelling. Gastrointestinal: Negative for abdominal pain, constipation, diarrhea and vomiting. GERD better. Still with some acid reflux, belching and coughing. Endocrine: Negative for polydipsia, polyphagia and polyuria. Genitourinary: Negative for decreased urine volume, dysuria, frequency, hematuria and urgency. Musculoskeletal: Positive for arthralgias. Negative for myalgias and neck pain. Skin: Positive for rash. Negative for color change. Allergic/Immunologic: Negative for immunocompromised state. Neurological: Positive for numbness. Negative for dizziness, weakness and headaches. Hematological: Negative for adenopathy. Does not bruise/bleed easily. Psychiatric/Behavioral: Negative for dysphoric mood and sleep disturbance. The patient is nervous/anxious. Objective      Physical Exam  Vitals and nursing note reviewed. Constitutional:       General: She is not in acute distress. Appearance: Normal appearance. She is well-developed. She is not ill-appearing, toxic-appearing or diaphoretic. Comments: obese   HENT:      Head: Normocephalic. Right Ear: Tympanic membrane, ear canal and external ear normal. There is no impacted cerumen. Left Ear: Tympanic membrane, ear canal and external ear normal. There is no impacted cerumen. Nose: Nose normal. No congestion or rhinorrhea. Mouth/Throat:      Mouth: Mucous membranes are moist.      Pharynx: No oropharyngeal exudate. Eyes:      General: No scleral icterus. Right eye: No discharge. Left eye: No discharge. Conjunctiva/sclera: Conjunctivae normal.      Pupils: Pupils are equal, round, and reactive to light. Neck:      Thyroid: No thyromegaly. Vascular: No JVD. Cardiovascular:      Rate and Rhythm: Normal rate and regular rhythm. Pulses: Normal pulses. Heart sounds: Normal heart sounds. No murmur heard. Pulmonary:      Effort: Pulmonary effort is normal. No respiratory distress. Breath sounds: Normal breath sounds. No decreased breath sounds, wheezing, rhonchi or rales. Chest:      Chest wall: No tenderness. Abdominal:      General: Bowel sounds are normal.      Palpations: Abdomen is soft. There is no mass. Tenderness: There is no abdominal tenderness. There is no right CVA tenderness or left CVA tenderness. Musculoskeletal:         General: No tenderness. Cervical back: Normal range of motion and neck supple. Right lower leg: No edema. Left lower leg: No edema. Lymphadenopathy:      Cervical: No cervical adenopathy. Skin:     General: Skin is warm. Capillary Refill: Capillary refill takes less than 2 seconds. Coloration: Skin is not pale. Findings: No erythema or rash. Neurological:      Mental Status: She is alert and oriented to person, place, and time. Cranial Nerves: No cranial nerve deficit. Motor: No abnormal muscle tone. Coordination: Coordination normal.      Deep Tendon Reflexes: Reflexes are normal and symmetric. Psychiatric:         Behavior: Behavior normal.         Thought Content: Thought content normal.         Judgment: Judgment normal.            An electronic signature was used to authenticate this note.     --Marques Nicole MD

## 2021-05-26 NOTE — PROGRESS NOTES
Medicare Annual Wellness Visit    Name: Deedee Patel Date: 2021   MRN: [id number] Sex: Female   Age: 72 y.o. Ethnicity: Non-/Non    : 1955 Race: Savannah Cerda is here for Jose Angel Rasheed AWV    Patient presents today for routine annual care wellness visit however she did begin her appointment by discussing several other concerns. I did try to perform her annual wellness visit first and then discuss some of her concerns and then we also scheduled her for routine follow-up as she was following up regarding her diabetes and several other things as well. See below regarding her pelvic pain concerns and decline of pelvic exam.      She does report that she continues to have a chronic cough. She underwent work-up with GI and does need a referral to ENT. She also tells me that she developed a rash all over her arms. When she went to the emergency room she stated that the doctor noticed her rash. She did just get a new dog and they thought maybe she was allergic to her dog. She is allergic to cats so this would might not be far from the tooth. This was worse first on her chest and arms and then got behind her knees. She did take Zyrtec and this made her drowsy so she has been using a steroid ointments that she had previous. This is better. She does need to see dermatology for routine skin exam and for evaluation if this rash recurs. She does need a refill on Metformin that she takes for type 2 diabetes. Screenings for behavioral, psychosocial and functional/safety risks, and cognitive dysfunction are all negative except as indicated below. These results, as well as other patient data from the [address] [facility] [address] form, are documented in Flowsheets linked to this Encounter. Allergies   Allergen Reactions    Sulfa Antibiotics Hives    Tetanus Toxoids Hives         Prior to Visit Medications    Medication Sig Taking?  Authorizing Provider   metFORMIN Pupils are equal, round, and reactive to light. Neck:      Thyroid: No thyromegaly. Vascular: No JVD. Cardiovascular:      Rate and Rhythm: Normal rate and regular rhythm. Heart sounds: Normal heart sounds. No murmur heard. Pulmonary:      Effort: Pulmonary effort is normal. No respiratory distress. Breath sounds: Normal breath sounds. No decreased breath sounds, wheezing, rhonchi or rales. Chest:      Chest wall: No tenderness. Abdominal:      General: Bowel sounds are normal.      Palpations: Abdomen is soft. There is no mass. Tenderness: There is no abdominal tenderness. There is no right CVA tenderness or left CVA tenderness. Musculoskeletal:         General: No tenderness. Cervical back: Normal range of motion and neck supple. Right lower leg: No edema. Left lower leg: No edema. Lymphadenopathy:      Cervical: No cervical adenopathy. Skin:     General: Skin is warm. Capillary Refill: Capillary refill takes less than 2 seconds. Coloration: Skin is not pale. Findings: Rash (she has some scattered erythematous macules over the arms ) present. No erythema. Neurological:      Mental Status: She is alert and oriented to person, place, and time. Cranial Nerves: No cranial nerve deficit. Motor: No abnormal muscle tone. Coordination: Coordination normal.      Deep Tendon Reflexes: Reflexes are normal and symmetric. Psychiatric:         Behavior: Behavior normal.         Thought Content: Thought content normal.         Judgment: Judgment normal.                Physical Activity:     Days of Exercise per Week:     Minutes of Exercise per Session:          Based upon direct observation of the patient, evaluation of cognition reveals recent and remote memory intact. Patient's complete Health Risk Assessment and screening values have been reviewed and are found in Flowsheets.  The following problems were reviewed today and where Hearing/Vision Interventions:  · Vision concerns:  patient encouraged to make appointment with his/her eye specialist      Personalized Preventive Plan   Current Health Maintenance Status  Immunization History   Administered Date(s) Administered    COVID-19, J&J, PF, 0.5 mL 03/19/2021    Influenza, Quadv, IM, (6 mo and older Fluzone, Flulaval, Fluarix and 3 yrs and older Afluria) 11/17/2017, 09/17/2018    Influenza, Quadv, IM, PF (6 mo and older Fluzone, Flulaval, Fluarix, and 3 yrs and older Afluria) 11/11/2019    Influenza, Quadv, Recombinant, IM PF (Flublok 18 yrs and older) 11/07/2020    Pneumococcal Conjugate 13-valent ([id number]) 01/29/2021    Pneumococcal Polysaccharide ([id number]) 11/11/2019        Health Maintenance   Topic Date Due    Colon Cancer Screen FIT/FOBT  04/29/2020    Cervical cancer screen  11/17/2020    Annual Wellness Visit (AWV)  Never done    Diabetic foot exam  07/07/2021 (Originally 9/17/2019)    Shingles Vaccine (1 of 2) 07/07/2021 (Originally 12/25/2005)    Diabetic retinal exam  07/17/2021 (Originally 2/6/2020)    Diabetic microalbuminuria test  01/29/2022    Lipid screen  01/29/2022    A1C test (Diabetic or Prediabetic)  05/05/2022    TSH testing  05/05/2022    Potassium monitoring  05/05/2022    Creatinine monitoring  05/05/2022    Breast cancer screen  05/05/2023    Pneumococcal 65+ years Vaccine (2 of 2 - PPSV23) 11/11/2024    DEXA (modify frequency per FRAX score)  Completed    Flu vaccine  Completed    COVID-19 Vaccine  Completed    Hepatitis C screen  Completed    HIV screen  Completed    Hepatitis A vaccine  Aged Out    Hib vaccine  Aged Out    Meningococcal (ACWY) vaccine  Aged Out     Recommendations for [employer] Due: see orders and patient instructions/AVS.  . Recommended screening schedule for the next 5-10 years is provided to the patient in written form: see Patient Leodan Nguyen was seen today for medicare awv. Diagnoses and all orders for this visit:    Initial Medicare annual wellness visit    Pelvic pain  Recommend Pap and pelvic exam -patient will reschedule. Obtain urinalysis, urine culture and ultrasound of the pelvis for further evaluation.  -     Urinalysis; Future  -     Culture, Urine; Future  -     US PELVIS COMPLETE; Future    Chronic cough  Worked up by GI with EGD. Still with persistent cough. Referral to ENT for further evaluation.  -     External Referral To ENT    Dermatitis  Possible allergy to dogs causing dermatitis. Referral to dermatology for evaluation and full skin exam.  -     External Referral To Dermatology    Type 2 diabetes mellitus with diabetic polyneuropathy, without long-term current use of insulin ([address]  -     metFORMIN (GLUCOPHAGE-XR) 500 MG extended release tablet;  Take 4 tablets by mouth daily (with breakfast)

## 2021-05-26 NOTE — PATIENT INSTRUCTIONS
Personalized Preventive Plan for Jaky Born - 5/26/2021  Medicare offers a range of preventive health benefits. Some of the tests and screenings are paid in full while other may be subject to a deductible, co-insurance, and/or copay. Some of these benefits include a comprehensive review of your medical history including lifestyle, illnesses that may run in your family, and various assessments and screenings as appropriate. After reviewing your medical record and screening and assessments performed today your provider may have ordered immunizations, labs, imaging, and/or referrals for you. A list of these orders (if applicable) as well as your Preventive Care list are included within your After Visit Summary for your review. Other Preventive Recommendations:    · A preventive eye exam performed by an eye specialist is recommended every 1-2 years to screen for glaucoma; cataracts, macular degeneration, and other eye disorders. · A preventive dental visit is recommended every 6 months. · Try to get at least 150 minutes of exercise per week or 10,000 steps per day on a pedometer . · Order or download the FREE \"Exercise & Physical Activity: Your Everyday Guide\" from [employer]. Call 4-895.606.9092 or search [employer] online. · You need 0948-7178 mg of calcium and 9314-6960 IU of vitamin D per day. It is possible to meet your calcium requirement with diet alone, but a vitamin D supplement is usually necessary to meet this goal.  · When exposed to the sun, use a sunscreen that protects against both UVA and UVB radiation with an SPF of 30 or greater. Reapply every 2 to 3 hours or after sweating, drying off with a towel, or swimming. · Always wear a seat belt when traveling in a car. Always wear a helmet when riding a bicycle or motorcycle. CC: f/u for  leukocytosis, cellulitis leg  Patient reports  she is feeling ok today  REVIEW OF SYSTEMS:  All other review of systems negative (Comprehensive ROS)    Antimicrobials Day #  :3/7  meropenem  IVPB 1000 milliGRAM(s) IV Intermittent every 8 hours    Other Medications Reviewed    T(F): 99.5 (19 @ 13:29), Max: 99.5 (19 @ 13:29)  HR: 85 (19 @ 13:29)  BP: 152/70 (19 @ 13:29)  RR: 18 (19 @ 13:29)  SpO2: 95% (19 @ 13:29)  Wt(kg): --    PHYSICAL EXAM:  General: alert, no acute distress  Eyes:  anicteric, no conjunctival injection, no discharge  Oropharynx: no lesions or injection 	  Neck: supple, without adenopathy  Lungs: clear to auscultation  Heart: regular rate and rhythm; no murmur, rubs or gallops  Abdomen: soft, nondistended, nontender, without mass or organomegaly  Skin: no lesions  Extremities: no clubbing, cyanosis,. legs with  edema  Neurologic: alert,  moves all extremities    LAB RESULTS:                        9.9    10.49 )-----------( 95       ( 19 Dec 2019 08:12 )             32.7         143  |  102  |  8   ----------------------------<  100<H>  3.5   |  30  |  0.57    Ca    9.0      19 Dec 2019 06:43        Urinalysis Basic - ( 17 Dec 2019 19:40 )    Color: Light Yellow / Appearance: Clear / S.011 / pH: x  Gluc: x / Ketone: Negative  / Bili: Negative / Urobili: <2 mg/dL   Blood: x / Protein: Trace / Nitrite: Negative   Leuk Esterase: Small / RBC: 3 /HPF / WBC 2 /HPF   Sq Epi: x / Non Sq Epi: 0 /HPF / Bacteria: Negative      MICROBIOLOGY:  RECENT CULTURES:   @ 21:25 .Blood Blood     Growth in aerobic bottle: Bacillus species not anthracis  "Susceptibilities not performed"    Growth in aerobic bottle: Gram Positive Rods    12-15 @ 03:30 .Urine Catheterized Escherichia coli ESBL    50,000 - 99,000 CFU/mL Escherichia coli ESBL  <10,000 CFU/ml Normal Urogenital love present      -15 @ 03:28 .Blood Blood     No growth to date.          RADIOLOGY REVIEWED:      < from: [facility] Duplex Lower Ext Vein Scan, Kristin (19 @ 09:38) >  IMPRESSION:     No evidence of deep venous thrombosis in either lower extremity above the   knee.      < end of copied text >          Assessment:  patient with severe anxiety , recent stay for visual changes and recommended TA bx but not done as yet. Came back after  reports more erratic behavior, popping oxy and ativan. Had very high wbc then spiked a temp, antibiotics changed from ancef to meropenem with positive urine cx with esbl but ua not impressive. she is however better. CT chest abd and pelvis refused  Plan:    continue meropenem for now , maybe 4 more days  local wound care to legs  ct body and chest if she accepts

## 2021-05-27 LAB
CULTURE: NORMAL
Lab: NORMAL
SPECIMEN DESCRIPTION: NORMAL

## 2021-05-31 ASSESSMENT — ENCOUNTER SYMPTOMS
ABDOMINAL PAIN: 0
CONSTIPATION: 0
COLOR CHANGE: 0
COUGH: 1
STRIDOR: 0
WHEEZING: 0
BLOOD IN STOOL: 0
SHORTNESS OF BREATH: 0

## 2021-06-01 PROBLEM — K63.5 POLYP OF COLON: Status: ACTIVE | Noted: 2021-06-01

## 2021-06-01 PROBLEM — R05.3 CHRONIC COUGH: Status: ACTIVE | Noted: 2021-06-01

## 2021-06-01 PROBLEM — K44.9 HIATAL HERNIA: Status: ACTIVE | Noted: 2021-06-01

## 2021-06-01 PROBLEM — F41.9 ANXIETY: Status: ACTIVE | Noted: 2021-06-01

## 2021-06-01 ASSESSMENT — ENCOUNTER SYMPTOMS
VOMITING: 0
COUGH: 1
CHEST TIGHTNESS: 0
CONSTIPATION: 0
ABDOMINAL PAIN: 0
EYE REDNESS: 0
DIARRHEA: 0
SHORTNESS OF BREATH: 0
VOICE CHANGE: 0
COLOR CHANGE: 0
SORE THROAT: 0
RHINORRHEA: 0
EYE DISCHARGE: 0
EYE PAIN: 0
WHEEZING: 0

## 2021-06-22 DIAGNOSIS — I10 ESSENTIAL HYPERTENSION: ICD-10-CM

## 2021-06-22 RX ORDER — LOSARTAN POTASSIUM AND HYDROCHLOROTHIAZIDE 25; 100 MG/1; MG/1
1 TABLET ORAL DAILY
Qty: 30 TABLET | Refills: 5 | Status: SHIPPED | OUTPATIENT
Start: 2021-06-22 | End: 2021-11-26

## 2021-06-22 NOTE — TELEPHONE ENCOUNTER
Last visit: 5/26/21  Last Med refill: 12/28/20    Next Visit Date:  Future Appointments   Date Time Provider Kati White   9/29/2021 10:30 AM Ej Bailey MD [facility] [address] Maintenance   Topic Date Due    Colon Cancer Screen FIT/FOBT  04/29/2020    Cervical cancer screen  11/17/2020    Diabetic foot exam  07/07/2021 (Originally 9/17/2019)    Shingles Vaccine (1 of 2) 07/07/2021 (Originally 12/25/2005)    Diabetic retinal exam  07/17/2021 (Originally 2/6/2020)    Diabetic microalbuminuria test  01/29/2022    Lipid screen  01/29/2022    A1C test (Diabetic or Prediabetic)  05/05/2022    TSH testing  05/05/2022    Potassium monitoring  05/05/2022    Creatinine monitoring  05/05/2022    Annual Wellness Visit (AWV)  05/27/2022    Breast cancer screen  05/05/2023    Pneumococcal 65+ years Vaccine (2 of 2 - PPSV23) 11/11/2024    DEXA (modify frequency per FRAX score)  Completed    Flu vaccine  Completed    COVID-19 Vaccine  Completed    Hepatitis C screen  Completed    HIV screen  Completed    Hepatitis A vaccine  Aged Out    Hib vaccine  Aged Out    Meningococcal (ACWY) vaccine  Aged Out       Hemoglobin A1C (%)   Date Value   05/05/2021 6.8 (H)   01/29/2021 6.6 (H)   12/06/2019 7.1 (H)             ( goal A1C is < 7)   Microalb/Crt.  Ratio (mcg/mg creat)   Date Value   01/29/2021 CANNOT BE CALCULATED     LDL Cholesterol (mg/dL)   Date Value   01/29/2021 38   12/06/2019 48     LDL Calculated (mg/dL)   Date Value   04/13/2015 32   07/22/2014 57       (goal LDL is <100)   AST (U/L)   Date Value   05/05/2021 46 (H)     ALT (U/L)   Date Value   05/05/2021 44 (H)     BUN (mg/dL)   Date Value   05/05/2021 13     BP Readings from Last 3 Encounters:   05/26/21 118/82   05/26/21 118/82   01/29/21 122/84          (goal 120/80)    All Future Testing planned in [facility]  Lab Frequency Next Occurrence   Lipid Panel Once 08/14/2021   Comprehensive Metabolic Panel Once 62/78/2754

## 2021-07-30 ENCOUNTER — OFFICE VISIT (OUTPATIENT)
Dept: PRIMARY CARE CLINIC | Age: 66
End: 2021-07-30
Payer: MEDICARE

## 2021-07-30 VITALS
HEART RATE: 64 BPM | SYSTOLIC BLOOD PRESSURE: 130 MMHG | WEIGHT: 179 LBS | OXYGEN SATURATION: 99 % | TEMPERATURE: 98 F | BODY MASS INDEX: 31.21 KG/M2 | DIASTOLIC BLOOD PRESSURE: 84 MMHG

## 2021-07-30 DIAGNOSIS — L30.9 DERMATITIS: Primary | ICD-10-CM

## 2021-07-30 PROCEDURE — G8399 PT W/DXA RESULTS DOCUMENT: HCPCS | Performed by: FAMILY MEDICINE

## 2021-07-30 PROCEDURE — 4040F PNEUMOC VAC/ADMIN/RCVD: CPT | Performed by: FAMILY MEDICINE

## 2021-07-30 PROCEDURE — 1123F ACP DISCUSS/DSCN MKR DOCD: CPT | Performed by: FAMILY MEDICINE

## 2021-07-30 PROCEDURE — 3017F COLORECTAL CA SCREEN DOC REV: CPT | Performed by: FAMILY MEDICINE

## 2021-07-30 PROCEDURE — 1090F PRES/ABSN URINE INCON ASSESS: CPT | Performed by: FAMILY MEDICINE

## 2021-07-30 PROCEDURE — G8427 DOCREV CUR MEDS BY ELIG CLIN: HCPCS | Performed by: FAMILY MEDICINE

## 2021-07-30 PROCEDURE — 1036F TOBACCO NON-USER: CPT | Performed by: FAMILY MEDICINE

## 2021-07-30 PROCEDURE — G8417 CALC BMI ABV UP PARAM F/U: HCPCS | Performed by: FAMILY MEDICINE

## 2021-07-30 PROCEDURE — 99214 OFFICE O/P EST MOD 30 MIN: CPT | Performed by: FAMILY MEDICINE

## 2021-07-30 RX ORDER — PREDNISONE 20 MG/1
TABLET ORAL
Qty: 18 TABLET | Refills: 0 | Status: SHIPPED | OUTPATIENT
Start: 2021-07-30 | End: 2021-08-09

## 2021-07-30 NOTE — PROGRESS NOTES
Subjective:  Genesis Regalado presents for   Chief Complaint   Patient presents with    Rash     Has had it a couple months- spreading. Had it before summer. Itches like crazy. On her arms/neck and now face and right breast.      Started 2 months ago. On her neck. It has now spread to her arms    Tried only hydrocortisone cream (otc) on this but it didn't do much    Had discussed this with her provider last month. Is taking oral benadryl at hs     Is outdoors a lot and in the sun    No new meds or otc vitamins etc    She describes them as looking like welts when they first appear. Patient Active Problem List   Diagnosis    Hypertension    Type 2 diabetes mellitus with diabetic polyneuropathy, without long-term current use of insulin ([facility]    Hypothyroidism    Hyperlipidemia    Gastroesophageal reflux disease    Diabetic polyneuropathy associated with type 2 diabetes mellitus (HCC)    Chronic gout of multiple sites    Hiatal hernia    Polyp of colon    Chronic cough    Anxiety         Review of Systems:  · General: no significant weight changes. · Respiratory: no cough, pleuritic chest pain, dyspnea, or wheezing  · Cardiovascular: no pain, SILVA, orthopnea, palpitations or claudication  · Gastrointestinal: no chronic nausea, vomiting, heartburn, diarrhea, constipation, bloating, or abdominal pain. No bloody or black stools. Objective:  Physical Exam   Vitals:   Vitals:    07/30/21 1206   BP: 130/84   Site: Left Upper Arm   Position: Sitting   Cuff Size: Medium Adult   Pulse: 64   Temp: 98 °F (36.7 °C)   SpO2: 99%   Weight: 179 lb (81.2 kg)     Wt Readings from Last 3 Encounters:   07/30/21 179 lb (81.2 kg)   05/26/21 177 lb (80.3 kg)   05/26/21 177 lb (80.3 kg)     Ht Readings from Last 3 Encounters:   05/26/21 5' 3.5\" (1.613 m)   01/29/21 5' 3\" (1.6 m)   07/14/20 5' 3\" (1.6 m)     Body mass index is 31.21 kg/m². Constitutional: She is oriented to person, place, and time.   She appears well-developed and well-nourished and in no acute distress. Answers all my questions appropriately. Skin - has evidence of long term sun damage on ue and le /face and neck. On the arms she has large numbers of patches with lsight indurationand scaliness . A few on the neck . Few on the leg. Back/abd- no sun damamge - none of the aboe lesion presnet    Assessment:   Encounter Diagnosis   Name Primary?  Dermatitis Yes         Plan:   There are no discontinued medications. THE ABOVE NOTED DISCONTINUED MEDS MAY ONLY BE FROM 'CLEANING UP' THE MED LIST AND WERE NOT ACTUALLY CANCELED;  SEE CHART FOR DETAILS! Orders Placed This Encounter   Medications    predniSONE (DELTASONE) 20 MG tablet     Sig: 3 tabs x 3 days, then 2 tabs x 3 days, then 1 tab x 3 days     Dispense:  18 tablet     Refill:  0     No orders of the defined types were placed in this encounter. Return in about 2 weeks (around 8/13/2021). There are no Patient Instructions on file for this visit. Follow up with provider      This could be aggravated or actually casued by a sun/drug reaction- but which? Use Mositurizing lotion at least QID    Short cooler showers    Don't scratch.

## 2021-09-29 ENCOUNTER — OFFICE VISIT (OUTPATIENT)
Dept: FAMILY MEDICINE CLINIC | Age: 66
End: 2021-09-29
Payer: MEDICARE

## 2021-09-29 ENCOUNTER — HOSPITAL ENCOUNTER (OUTPATIENT)
Age: 66
Setting detail: SPECIMEN
Discharge: HOME | End: 2021-09-29
Payer: MEDICARE

## 2021-09-29 VITALS
WEIGHT: 175.8 LBS | DIASTOLIC BLOOD PRESSURE: 68 MMHG | HEIGHT: 63 IN | TEMPERATURE: 97.3 F | BODY MASS INDEX: 31.15 KG/M2 | SYSTOLIC BLOOD PRESSURE: 110 MMHG | HEART RATE: 59 BPM

## 2021-09-29 DIAGNOSIS — Z01.419 ENCOUNTER FOR ROUTINE GYNECOLOGICAL EXAMINATION WITH PAPANICOLAOU SMEAR OF CERVIX: ICD-10-CM

## 2021-09-29 DIAGNOSIS — N89.8 VAGINAL ODOR: ICD-10-CM

## 2021-09-29 DIAGNOSIS — Z09 HOSPITAL DISCHARGE FOLLOW-UP: ICD-10-CM

## 2021-09-29 DIAGNOSIS — N95.2 ATROPHIC VAGINITIS: ICD-10-CM

## 2021-09-29 DIAGNOSIS — I95.1 ORTHOSTATIC HYPOTENSION: ICD-10-CM

## 2021-09-29 DIAGNOSIS — E11.42 DIABETIC POLYNEUROPATHY ASSOCIATED WITH TYPE 2 DIABETES MELLITUS (HCC): ICD-10-CM

## 2021-09-29 DIAGNOSIS — K63.5 POLYP OF COLON, UNSPECIFIED PART OF COLON, UNSPECIFIED TYPE: ICD-10-CM

## 2021-09-29 DIAGNOSIS — L30.9 DERMATITIS: ICD-10-CM

## 2021-09-29 DIAGNOSIS — E03.9 HYPOTHYROIDISM, UNSPECIFIED TYPE: ICD-10-CM

## 2021-09-29 DIAGNOSIS — E11.42 TYPE 2 DIABETES MELLITUS WITH DIABETIC POLYNEUROPATHY, WITHOUT LONG-TERM CURRENT USE OF INSULIN (HCC): Primary | ICD-10-CM

## 2021-09-29 DIAGNOSIS — K44.9 HIATAL HERNIA: ICD-10-CM

## 2021-09-29 DIAGNOSIS — M1A.09X0 CHRONIC GOUT OF MULTIPLE SITES, UNSPECIFIED CAUSE: ICD-10-CM

## 2021-09-29 DIAGNOSIS — I10 ESSENTIAL HYPERTENSION: ICD-10-CM

## 2021-09-29 DIAGNOSIS — K21.9 GASTROESOPHAGEAL REFLUX DISEASE, UNSPECIFIED WHETHER ESOPHAGITIS PRESENT: ICD-10-CM

## 2021-09-29 DIAGNOSIS — Z12.39 ENCOUNTER FOR SCREENING BREAST EXAMINATION AND DISCUSSION OF BREAST SELF EXAMINATION: ICD-10-CM

## 2021-09-29 DIAGNOSIS — E78.5 HYPERLIPIDEMIA, UNSPECIFIED HYPERLIPIDEMIA TYPE: ICD-10-CM

## 2021-09-29 DIAGNOSIS — Z12.89 ENCOUNTER FOR SCREENING FOR MALIGNANT NEOPLASM OF OTHER SITES: ICD-10-CM

## 2021-09-29 DIAGNOSIS — F41.9 ANXIETY: ICD-10-CM

## 2021-09-29 DIAGNOSIS — Z23 NEED FOR INFLUENZA VACCINATION: ICD-10-CM

## 2021-09-29 PROCEDURE — 1123F ACP DISCUSS/DSCN MKR DOCD: CPT | Performed by: PEDIATRICS

## 2021-09-29 PROCEDURE — G8417 CALC BMI ABV UP PARAM F/U: HCPCS | Performed by: PEDIATRICS

## 2021-09-29 PROCEDURE — 2022F DILAT RTA XM EVC RTNOPTHY: CPT | Performed by: PEDIATRICS

## 2021-09-29 PROCEDURE — 3017F COLORECTAL CA SCREEN DOC REV: CPT | Performed by: PEDIATRICS

## 2021-09-29 PROCEDURE — G8427 DOCREV CUR MEDS BY ELIG CLIN: HCPCS | Performed by: PEDIATRICS

## 2021-09-29 PROCEDURE — 99215 OFFICE O/P EST HI 40 MIN: CPT | Performed by: PEDIATRICS

## 2021-09-29 PROCEDURE — 1090F PRES/ABSN URINE INCON ASSESS: CPT | Performed by: PEDIATRICS

## 2021-09-29 PROCEDURE — G8399 PT W/DXA RESULTS DOCUMENT: HCPCS | Performed by: PEDIATRICS

## 2021-09-29 PROCEDURE — 1036F TOBACCO NON-USER: CPT | Performed by: PEDIATRICS

## 2021-09-29 PROCEDURE — 4040F PNEUMOC VAC/ADMIN/RCVD: CPT | Performed by: PEDIATRICS

## 2021-09-29 PROCEDURE — 90694 VACC AIIV4 NO PRSRV 0.5ML IM: CPT | Performed by: PEDIATRICS

## 2021-09-29 PROCEDURE — G0008 ADMIN INFLUENZA VIRUS VAC: HCPCS | Performed by: PEDIATRICS

## 2021-09-29 PROCEDURE — 3044F HG A1C LEVEL LT 7.0%: CPT | Performed by: PEDIATRICS

## 2021-09-29 RX ORDER — ALLOPURINOL 100 MG/1
100 TABLET ORAL DAILY
Qty: 90 TABLET | Refills: 1 | Status: SHIPPED
Start: 2021-09-29 | End: 2022-01-05 | Stop reason: DRUGHIGH

## 2021-09-29 SDOH — ECONOMIC STABILITY: FOOD INSECURITY: WITHIN THE PAST 12 MONTHS, YOU WORRIED THAT YOUR FOOD WOULD RUN OUT BEFORE YOU GOT MONEY TO BUY MORE.: NEVER TRUE

## 2021-09-29 SDOH — ECONOMIC STABILITY: FOOD INSECURITY: WITHIN THE PAST 12 MONTHS, THE FOOD YOU BOUGHT JUST DIDN'T LAST AND YOU DIDN'T HAVE MONEY TO GET MORE.: NEVER TRUE

## 2021-09-29 ASSESSMENT — SOCIAL DETERMINANTS OF HEALTH (SDOH): HOW HARD IS IT FOR YOU TO PAY FOR THE VERY BASICS LIKE FOOD, HOUSING, MEDICAL CARE, AND HEATING?: NOT HARD AT ALL

## 2021-09-29 NOTE — PROGRESS NOTES
2021    Matthieu Mccann (:  1955) is a 72 y.o. female, here for a preventive medicine evaluation. Patient presents today for routine follow-up and Pap and pelvic. She would also like to complete her hospital follow-up visit as well. She does have a history of type 2 diabetes with neuropathy, hypertension, hyperlipidemia, hypothyroidism, GERD, hiatal hernia, colon polyps and gout. She also has a history of anxiety. She states she has been checking her blood sugars occasionally and they are okay. She does continue to take Metformin and she does seem to be tolerating this well. She does still continue to have numbness and tingling in her feet and toes. She still continues to think this is secondary to Colcrys. I have advised her in the past that I feel her symptoms are related to diabetic neuropathy. She wants to try to stop her Colcrys to see if this is some reason for her symptoms. She is due for her eye exam and she was encouraged to do this. Her blood pressure is well controlled with her current medications now. She has been trying to stay hydrated by drinking more water. She does have a history of hyperlipidemia for which she takes atorvastatin and she is tolerating this without side effects. She does have a history of hypothyroidism and continues to take her Synthroid regularly. She does take pantoprazole for her history of GERD. She did have an EGD with Dr. Kat Dolan which showed a hiatal hernia. He advised her to stay on Protonix and watch what she is eating. She did have a colonoscopy on 2021 which showed 3 polyps. One polyp was a hyperplastic polyp and 2 were tubular adenomas. Her next colonoscopy is due in . She does have a history of chronic gout treated with Colcrys. She does want to stop this to see if this is the reason for her numbness and tingling in her feet. She is willing to start allopurinol to control her gout.   She does have a history of anxiety and has used Ativan in the past for severe anxiety. She does have difficulty sleeping. She reports that she is postmenopausal for at least 25 years. She denies any postmenopausal bleeding, vaginal discharge or vaginal pain. She does have a history of atrophic vaginitis for which she has used clobetasol in the past.  She does complain of having a vaginal odor that is worse after intercourse which she is complained of for some time now. She has been tested for bacterial vaginosis in the past.  She has tried multiple different soaps and washes in the past without improvement. She did just want to mention this again. I advised her that I would be happy to give her a referral to gynecology if she chooses. She is going to continue to monitor. She does perform a self breast exam regularly. She thought that she felt something in her right breast about 2 months ago. She did have a normal mammogram in May. Her last DEXA scan was in 2017 and was normal.      She was recently hospitalized at [facility] becoming very dizzy and lightheaded [facility]kids. Apparently she became quite dizzy and lightheaded and then had significant stomach cramping and diarrhea. She was taken to the rescue center at the [facility] access hospital where blood sugar was obtained and was 141. They did do a twelve-lead EKG she is not sure of these results. Her blood pressure was low at 84/62. They recommended that she go to the hospital for monitoring. Apparently she had had several different EKGs and each 1 showed something different so the doctor was uncomfortable letting her go home. She was monitored for 24 hours and then gave her IV fluids. She was seen by Dr. Melanie Martines and apparently he was not able to fully explain what was going on. She apparently was told that she could be discharged but never really got any straight answers.   It seemed as though the consensus was she was dehydrated and was somewhat orthostatic. The only records I have from that hospital stay are a 2D echocardiogram which is essentially normal and chest x-ray that was normal.  I have nothing else. She was supposed to follow-up with her regular cardiologist, Dr. Jean Tirado but apparently they are booked until December. I told her that she needed to call and ask for sooner appointment with someone else if Dr. Caridad Adams not able to see her. She has two other concerns today. She does tell me that she got one of the blisters on her right great toe that she will get intermittently. I advised her that she should follow-up with dermatology. She reports that she has had a blistery bumpy rash on her arms all summer. Now it just seems to be red and is not very bumpy. She did try some clobetasol over the summer and it really helped. One time she used the clobetasol got a hive from it. I did recommend that she see dermatology for evaluation of this rash that appears to be some kind of sun related dermatitis. cmw    Patient Active Problem List   Diagnosis    Hypertension    Type 2 diabetes mellitus with diabetic polyneuropathy, without long-term current use of insulin ([facility] [address]    Hypothyroidism    Hyperlipidemia    Gastroesophageal reflux disease    Diabetic polyneuropathy associated with type 2 diabetes mellitus (HCC)    Chronic gout of multiple sites    Hiatal hernia    Polyp of colon    Chronic cough    Anxiety       Review of Systems   Constitutional: Negative for appetite change, chills, fatigue and fever. HENT: Negative for congestion, ear pain, postnasal drip, rhinorrhea, sore throat and voice change. Some occasional allergy symptoms   Eyes: Negative for pain, discharge, redness and visual disturbance. Respiratory: Positive for cough. Negative for chest tightness, shortness of breath and wheezing. Cardiovascular: Negative for chest pain, palpitations and leg swelling.    Gastrointestinal: Negative for abdominal pain, Yes Historical Provider, MD   aspirin 81 MG EC tablet Take 81 mg by mouth daily. Yes Historical Provider, MD   docusate sodium (COLACE) 100 MG capsule Take 100 mg by mouth daily. Yes Historical Provider, MD   glucose blood VI test strips ([employer] BLOOD GLUCOSE TEST) strip Test blood sugar once daily. Dx: Diabetes mellitus type 2 in obese 250.00, 278.00  Zach Torres MD        Allergies   Allergen Reactions    Sulfa Antibiotics Hives    Tetanus Toxoids Hives       Past Medical History:   Diagnosis Date    Abnormal weight gain     Allergic rhinitis     Anxiety     Asthma     Depression     GERD (gastroesophageal reflux disease)     Hyperlipidemia     Hypertension     Hypothyroidism     Postmenopause bleeding     Tietze's disease     Type II or unspecified type diabetes mellitus without mention of complication, not stated as uncontrolled        Past Surgical History:   Procedure Laterality Date     SECTION      HEMORRHOID SURGERY      HERNIA REPAIR      WRIST FRACTURE SURGERY         Social History     Socioeconomic History    Marital status:      Spouse name: Not on file    Number of children: Not on file    Years of education: Not on file    Highest education level: Not on file   Occupational History    Not on file   Tobacco Use    Smoking status: Never Smoker    Smokeless tobacco: Never Used   Substance and Sexual Activity    Alcohol use: Yes    Drug use: No    Sexual activity: Not on file   Other Topics Concern    Not on file   Social History Narrative    Not on file     Social Determinants of Health     Financial Resource Strain: Low Risk     Difficulty of Paying Living Expenses: Not hard at all   Food Insecurity: No Food Insecurity    Worried About Running Out of Food in the Last Year: Never true    [address] in the Last Year: Never true   Transportation Needs:     Lack of Transportation (Medical):      Lack of Transportation (Non-Medical): Physical Activity:     Days of Exercise per Week:     Minutes of Exercise per Session:    Stress:     Feeling of Stress :    Social Connections:     Frequency of Communication with Friends and Family:     Frequency of Social Gatherings with Friends and Family:     Attends Anab[employer]ist Services:     Active Member of Clubs or Organizations:     Attends Club or Organization Meetings:     Marital Status:    Intimate Partner Violence:     Fear of Current or Ex-Partner:     Emotionally Abused:     Physically Abused:     Sexually Abused:         No family history on file. ADVANCE DIRECTIVE: N, <no information>    Vitals:    09/29/21 1032   BP: 110/68   Site: Left Upper Arm   Position: Sitting   Cuff Size: Large Adult   Pulse: 59   Temp: 97.3 °F (36.3 °C)   TempSrc: Temporal   Weight: 175 lb 12.8 oz (79.7 kg)   Height: 5' 3.25\" (1.607 m)     Estimated body mass index is 30.9 kg/m² as calculated from the following:    Height as of this encounter: 5' 3.25\" (1.607 m). Weight as of this encounter: 175 lb 12.8 oz (79.7 kg). Physical Exam  Vitals and nursing note reviewed. Constitutional:       General: She is not in acute distress. Appearance: Normal appearance. She is well-developed. She is obese. She is not ill-appearing, toxic-appearing or diaphoretic. Comments: obese   HENT:      Head: Normocephalic. Right Ear: Tympanic membrane, ear canal and external ear normal.      Left Ear: Tympanic membrane, ear canal and external ear normal.      Nose: Nose normal.      Mouth/Throat:      Mouth: Mucous membranes are moist.      Pharynx: No oropharyngeal exudate. Eyes:      General: No scleral icterus. Right eye: No discharge. Left eye: No discharge. Conjunctiva/sclera: Conjunctivae normal.      Pupils: Pupils are equal, round, and reactive to light. Neck:      Thyroid: No thyromegaly. Vascular: No JVD.    Cardiovascular:      Rate and Rhythm: Normal rate and regular rhythm. Pulses: Normal pulses. Heart sounds: Normal heart sounds. No murmur heard. Pulmonary:      Effort: Pulmonary effort is normal. No respiratory distress. Breath sounds: Normal breath sounds. No decreased breath sounds, wheezing, rhonchi or rales. Chest:      Chest wall: No tenderness. Breasts:         Right: Normal. No swelling, bleeding, inverted nipple, mass, nipple discharge, skin change or tenderness. Left: Normal. No swelling, bleeding, inverted nipple, mass, nipple discharge, skin change or tenderness. Comments: No abnormalities noted on breast exam  Abdominal:      General: Bowel sounds are normal.      Palpations: Abdomen is soft. There is no mass. Tenderness: There is no abdominal tenderness. Hernia: There is no hernia in the left inguinal area. Genitourinary:     Exam position: Supine. Labia:         Right: No rash or tenderness. Left: No rash or tenderness. Vagina: Normal.       Musculoskeletal:         General: No tenderness. Normal range of motion. Cervical back: Normal range of motion and neck supple. Right lower leg: No edema. Left lower leg: No edema. Feet:      Right foot:      Protective Sensation: 4 sites tested. 6 sites sensed. Skin integrity: Skin integrity normal.      Toenail Condition: Right toenails are normal.      Left foot:      Protective Sensation: 4 sites tested. 6 sites sensed. Skin integrity: Skin integrity normal.      Toenail Condition: Left toenails are normal.   Lymphadenopathy:      Cervical: No cervical adenopathy. Lower Body: No right inguinal adenopathy. No left inguinal adenopathy. Skin:     General: Skin is warm. Capillary Refill: Capillary refill takes less than 2 seconds. Coloration: Skin is not pale. Findings: Rash present. No erythema.       Comments: She has an erythematous rash over the dorsal surface of both arms that starts at the wrist and stops on the upper arm where a short sleeve shirt would end. Neurological:      General: No focal deficit present. Mental Status: She is alert and oriented to person, place, and time. Cranial Nerves: No cranial nerve deficit. Motor: No abnormal muscle tone. Coordination: Coordination normal.      Deep Tendon Reflexes: Reflexes are normal and symmetric. Psychiatric:         Behavior: Behavior normal.         Thought Content: Thought content normal.         Judgment: Judgment normal.         No flowsheet data found. Lab Results   Component Value Date    CHOL 129 12/06/2019    CHOL 112 01/18/2019    CHOL 114 09/17/2018    CHOL 141 04/13/2015    CHOL 141 07/22/2014    CHOL 140 03/05/2013    CHOLFAST 128 01/29/2021    TRIG 225 12/06/2019    TRIG 196 01/18/2019    TRIG 223 09/17/2018    TRIGLYCFAST 252 01/29/2021    HDL 40 01/29/2021    HDL 36 12/06/2019    HDL 39 01/18/2019    LDLCHOLESTEROL 38 01/29/2021    LDLCHOLESTEROL 48 12/06/2019    LDLCHOLESTEROL 34 01/18/2019    LDLCALC 32 04/13/2015    LDLCALC 57 07/22/2014    LDLCALC 51 03/05/2013    GLUF 113 01/29/2021    GLUCOSE 118 09/08/2021    GLUCOSE 106 01/18/2019    LABA1C 6.8 05/05/2021    LABA1C 6.6 01/29/2021    LABA1C 7.1 12/06/2019       The ASCVD Risk score (Tanya Bonilla., et al., 2013) failed to calculate for the following reasons:     The valid total cholesterol range is 130 to 320 mg/dL    Immunization History   Administered Date(s) Administered    COVID-19, J&J, PF, 0.5 mL 03/19/2021    Influenza, Quadv, IM, (6 mo and older Fluzone, Flulaval, Fluarix and 3 yrs and older Afluria) 11/17/2017, 09/17/2018    Influenza, Quadv, IM, PF (6 mo and older Fluzone, Flulaval, Fluarix, and 3 yrs and older Afluria) 11/11/2019    Influenza, Quadv, Recombinant, IM PF (Flublok 18 yrs and older) 11/07/2020    Influenza, Quadv, adjuvanted, 65 yrs +, IM, PF (Fluad) 09/29/2021    Pneumococcal Conjugate 13-valent (Anselm Sandoval) 01/29/2021    Pneumococcal Polysaccharide ([id number]) 11/11/2019       Health Maintenance   Topic Date Due    Shingles Vaccine (1 of 2) Never done    Diabetic retinal exam  02/06/2020    Diabetic microalbuminuria test  01/29/2022    Lipid screen  01/29/2022    A1C test (Diabetic or Prediabetic)  05/05/2022    Annual Wellness Visit (AWV)  05/27/2022    TSH testing  09/07/2022    Potassium monitoring  09/08/2022    Creatinine monitoring  09/08/2022    Diabetic foot exam  09/29/2022    Breast cancer screen  05/05/2023    Pneumococcal 65+ years Vaccine (2 of 2 - PPSV23) 11/11/2024    Cervical cancer screen  09/29/2026    Colon cancer screen colonoscopy  04/09/2031    DEXA (modify frequency per FRAX score)  Completed    Flu vaccine  Completed    COVID-19 Vaccine  Completed    Hepatitis C screen  Completed    HIV screen  Completed    Hepatitis A vaccine  Aged Out    Hib vaccine  Aged Out    Meningococcal (ACWY) vaccine  Aged Out          ASSESSMENT/PLAN:    1. Type 2 diabetes mellitus with diabetic polyneuropathy, without long-term current use of insulin ([facility])  -      DIABETES FOOT EXAM  2. Diabetic polyneuropathy associated with type 2 diabetes mellitus ([facility] [address]  -      DIABETES FOOT EXAM  3. Essential hypertension  4. Hyperlipidemia, unspecified hyperlipidemia type  5. Hypothyroidism, unspecified type  6. Gastroesophageal reflux disease, unspecified whether esophagitis present  7. Hiatal hernia  8. Polyp of colon, unspecified part of colon, unspecified type  9. Chronic gout of multiple sites, unspecified cause  -     allopurinol (ZYLOPRIM) 100 MG tablet; Take 1 tablet by mouth daily, Disp-90 tablet, R-1Normal  10. Anxiety  11. Encounter for routine gynecological examination with Papanicolaou smear of cervix  -     PAP Smear; Future  12. Encounter for screening for malignant neoplasm of other sites  15. Encounter for screening breast examination and discussion of breast self examination  14.  Atrophic vaginitis  15. Vaginal odor  16. Orthostatic hypotension  17. Hospital discharge follow-up  18. Dermatitis  -     External Referral To Dermatology  -     hydrocortisone 2.5 % cream; Apply topically 2 times daily. , Disp-28 g, R-1, Normal  19. Need for influenza vaccination  -     INFLUENZA, QUADV, ADJUVANTED, 72 YRS =, IM, PF, PREFILL SYR, 0.5ML (FLUAD)    Proceed with continue current medications  Obtain labs when due  Strict diabetic diet and regular exercise encouraged  Monitor blood pressure occasionally  Follow-up with cardiology as scheduled  Follow-up with GI as needed  Colonoscopy due in 2026  Discontinue colchicine after starting allopurinol as prescribed  Ativan only as needed for severe anxiety  Obtain Pap  Consider consult with GYN for evaluation of vaginal odor  Recommend monthly self breast exams  Mammogram due in 2022  DEXA scan in 2022  Increase fluids to ensure adequate hydration  Follow-up with cardiology as scheduled  Obtain records from recent hospitalization  Dermatology consult for dermatitis  Trial of hydrocortisone 2.5% to rash only for short period of time  Flu vaccine today  Shingles vaccine recommended  Call with concerns    On this date 9/29/2021 I have spent over 75 minutes reviewing previous notes, test results and face to face with the patient discussing the diagnosis and importance of compliance with the treatment plan as well as documenting on the day of the visit. Return in about 3 months (around 12/29/2021) for routine follow up. An electronic signature was used to authenticate this note.     --Desire Zamarripa MD on 10/5/2021 at 8:57 PM

## 2021-09-29 NOTE — PROGRESS NOTES
Vaccine Information Sheet, \"Influenza - Inactivated\"  given to Julio Dailey, or parent/legal guardian of  Julio Dailey and verbalized understanding. Patient responses:    Have you ever had a reaction to a flu vaccine? No  Do you have any current illness? No  Have you ever had Guillian Columbus Syndrome? No  Do you have a serious allergy to any of the following: Neomycin, Polymyxin, Thimerosal, eggs or egg products? No    Flu vaccine given per order. Please see immunization tab. Risks and benefits explained. Current VIS given.       Immunizations Administered     Name Date Dose Route    Influenza, Quadv, adjuvanted, 65 yrs +, IM, PF (Fluad) 9/29/2021 0.5 mL Intramuscular    Site: Deltoid- Left    Lot: [id number]    NDC: 92530-830-67

## 2021-10-01 LAB
HPV SAMPLE: NORMAL
HPV, GENOTYPE 16: NOT DETECTED
HPV, GENOTYPE 18: NOT DETECTED
HPV, HIGH RISK OTHER: NOT DETECTED
HPV, INTERPRETATION: NORMAL
SPECIMEN DESCRIPTION: NORMAL

## 2021-10-04 LAB — CYTOLOGY REPORT: NORMAL

## 2021-10-05 ASSESSMENT — ENCOUNTER SYMPTOMS
EYE DISCHARGE: 0
CONSTIPATION: 0
COUGH: 1
DIARRHEA: 0
SHORTNESS OF BREATH: 0
VOMITING: 0
COLOR CHANGE: 0
VOICE CHANGE: 0
EYE REDNESS: 0
WHEEZING: 0
ABDOMINAL PAIN: 0
EYE PAIN: 0
CHEST TIGHTNESS: 0
RHINORRHEA: 0
SORE THROAT: 0

## 2021-11-02 DIAGNOSIS — K21.9 GASTROESOPHAGEAL REFLUX DISEASE: ICD-10-CM

## 2021-11-02 DIAGNOSIS — E78.5 HYPERLIPIDEMIA, UNSPECIFIED HYPERLIPIDEMIA TYPE: ICD-10-CM

## 2021-11-02 DIAGNOSIS — E11.42 TYPE 2 DIABETES MELLITUS WITH DIABETIC POLYNEUROPATHY, WITHOUT LONG-TERM CURRENT USE OF INSULIN (HCC): ICD-10-CM

## 2021-11-03 RX ORDER — PANTOPRAZOLE SODIUM 40 MG/1
40 TABLET, DELAYED RELEASE ORAL DAILY
Qty: 90 TABLET | Refills: 1 | Status: SHIPPED | OUTPATIENT
Start: 2021-11-03 | End: 2022-05-11

## 2021-11-03 RX ORDER — ATORVASTATIN CALCIUM 20 MG/1
20 TABLET, FILM COATED ORAL DAILY
Qty: 90 TABLET | Refills: 1 | Status: SHIPPED
Start: 2021-11-03 | End: 2021-12-22 | Stop reason: DRUGHIGH

## 2021-11-03 RX ORDER — METFORMIN HYDROCHLORIDE 500 MG/1
1500 TABLET, EXTENDED RELEASE ORAL
Qty: 270 TABLET | Refills: 1 | Status: SHIPPED | OUTPATIENT
Start: 2021-11-03 | End: 2022-04-11

## 2021-11-03 NOTE — TELEPHONE ENCOUNTER
LOV 9-29-21  LRF Lipitor & Protonix 3-18-21, Metformin 5-26-21    Health Maintenance   Topic Date Due    Shingles Vaccine (1 of 2) Never done    Diabetic retinal exam  02/06/2020    Diabetic microalbuminuria test  01/29/2022    Lipid screen  01/29/2022    A1C test (Diabetic or Prediabetic)  05/05/2022    Annual Wellness Visit (AWV)  05/27/2022    TSH testing  09/07/2022    Potassium monitoring  09/08/2022    Creatinine monitoring  09/08/2022    Diabetic foot exam  09/29/2022    Breast cancer screen  05/05/2023    Pneumococcal 65+ years Vaccine (2 of 2 - PPSV23) 11/11/2024    Cervical cancer screen  09/29/2026    Colon cancer screen colonoscopy  04/09/2031    DEXA (modify frequency per FRAX score)  Completed    Flu vaccine  Completed    COVID-19 Vaccine  Completed    Hepatitis C screen  Completed    HIV screen  Completed    Hepatitis A vaccine  Aged Out    Hib vaccine  Aged Out    Meningococcal (ACWY) vaccine  Aged Out             (applicable per patient's age: Cancer Screenings, Depression Screening, Fall Risk Screening, Immunizations)    Hemoglobin A1C (%)   Date Value   05/05/2021 6.8 (H)   01/29/2021 6.6 (H)   12/06/2019 7.1 (H)     Microalb/Crt.  Ratio (mcg/mg creat)   Date Value   01/29/2021 CANNOT BE CALCULATED     LDL Cholesterol (mg/dL)   Date Value   01/29/2021 38     LDL Calculated (mg/dL)   Date Value   04/13/2015 32     AST (U/L)   Date Value   09/08/2021 29     ALT (U/L)   Date Value   09/08/2021 38     BUN (mg/dL)   Date Value   09/08/2021 17      (goal A1C is < 7)   (goal LDL is <100) need 30-50% reduction from baseline     BP Readings from Last 3 Encounters:   09/29/21 110/68   07/30/21 130/84   05/26/21 118/82    (goal /80)      All Future Testing planned in [facility]:  Lab Frequency Next Occurrence   Lipid Panel Once 07/01/2022   Comprehensive Metabolic Panel Once 05/85/4628   Hemoglobin A1C Once 07/01/2022   TSH without Reflex Once 07/01/2022   T4, Free Once 07/01/2022 Uric Acid Once 07/01/2022   US PELVIS COMPLETE Once 05/26/2022       Next Visit Date:  Future Appointments   Date Time Provider Kati Scotti   1/5/2022 10:00 AM [facility]bin Segura   2/2/2022 11:15 AM Gregor Arias MD [facility] [place]            Patient Active Problem List:     Hypertension     Type 2 diabetes mellitus with diabetic polyneuropathy, without long-term current use of insulin ([facility] [address]     Hypothyroidism     Hyperlipidemia     Gastroesophageal reflux disease     Diabetic polyneuropathy associated with type 2 diabetes mellitus (HCC)     Chronic gout of multiple sites     Hiatal hernia     Polyp of colon     Chronic cough     Anxiety

## 2021-11-24 DIAGNOSIS — I10 ESSENTIAL HYPERTENSION: ICD-10-CM

## 2021-11-24 DIAGNOSIS — E03.9 HYPOTHYROIDISM, UNSPECIFIED TYPE: ICD-10-CM

## 2021-11-24 RX ORDER — LEVOTHYROXINE SODIUM 150 MCG
TABLET ORAL
Qty: 30 TABLET | Refills: 5 | Status: SHIPPED | OUTPATIENT
Start: 2021-11-24 | End: 2022-01-11

## 2021-11-24 NOTE — TELEPHONE ENCOUNTER
Last visit: 9/29/21  Last Med refill: 5/26/21    Next Visit Date:  Future Appointments   Date Time Provider Kati White   1/5/2022 10:00 AM MD Juan Miguel [place] [facility] Vitaliy Pro   2/2/2022 11:15 AM Elvis Cooper MD Timm Barthel [facility] [address] Maintenance   Topic Date Due    Shingles Vaccine (1 of 2) Never done    Diabetic retinal exam  02/06/2020    COVID-19 Vaccine (2 - Booster for Magdi series) 05/14/2021    Diabetic microalbuminuria test  01/29/2022    Lipid screen  01/29/2022    A1C test (Diabetic or Prediabetic)  05/05/2022    Annual Wellness Visit (AWV)  05/27/2022    TSH testing  09/07/2022    Potassium monitoring  09/08/2022    Creatinine monitoring  09/08/2022    Diabetic foot exam  09/29/2022    Breast cancer screen  05/05/2023    Pneumococcal 65+ years Vaccine (2 of 2 - PPSV23) 11/11/2024    Cervical cancer screen  09/29/2026    Colon cancer screen colonoscopy  04/09/2031    DEXA (modify frequency per FRAX score)  Completed    Flu vaccine  Completed    Hepatitis C screen  Completed    HIV screen  Completed    Hepatitis A vaccine  Aged Out    Hib vaccine  Aged Out    Meningococcal (ACWY) vaccine  Aged Out       Hemoglobin A1C (%)   Date Value   05/05/2021 6.8 (H)   01/29/2021 6.6 (H)   12/06/2019 7.1 (H)             ( goal A1C is < 7)   Microalb/Crt.  Ratio (mcg/mg creat)   Date Value   01/29/2021 CANNOT BE CALCULATED     LDL Cholesterol (mg/dL)   Date Value   01/29/2021 38   12/06/2019 48     LDL Calculated (mg/dL)   Date Value   04/13/2015 32   07/22/2014 57       (goal LDL is <100)   AST (U/L)   Date Value   09/08/2021 29     ALT (U/L)   Date Value   09/08/2021 38     BUN (mg/dL)   Date Value   09/08/2021 17     BP Readings from Last 3 Encounters:   09/29/21 110/68   07/30/21 130/84   05/26/21 118/82          (goal 120/80)    All Future Testing planned in [facility]  Lab Frequency Next Occurrence   Lipid Panel Once 07/01/2022   Comprehensive Metabolic Panel Once 07/01/2022   Hemoglobin A1C Once 07/01/2022   TSH without Reflex Once 07/01/2022   T4, Free Once 07/01/2022   Uric Acid Once 07/01/2022   US PELVIS COMPLETE Once 05/26/2022               Patient Active Problem List:     Hypertension     Type 2 diabetes mellitus with diabetic polyneuropathy, without long-term current use of insulin (HCC)     Hypothyroidism     Hyperlipidemia     Gastroesophageal reflux disease     Diabetic polyneuropathy associated with type 2 diabetes mellitus (HCC)     Chronic gout of multiple sites     Hiatal hernia     Polyp of colon     Chronic cough     Anxiety

## 2021-11-26 RX ORDER — LOSARTAN POTASSIUM AND HYDROCHLOROTHIAZIDE 25; 100 MG/1; MG/1
1 TABLET ORAL DAILY
Qty: 90 TABLET | Refills: 1 | Status: SHIPPED | OUTPATIENT
Start: 2021-11-26 | End: 2022-05-11

## 2021-11-26 NOTE — TELEPHONE ENCOUNTER
LOV 9-29-21  LRF 6-22-21    Health Maintenance   Topic Date Due    Shingles Vaccine (1 of 2) Never done    Diabetic retinal exam  02/06/2020    COVID-19 Vaccine (2 - Booster for Magdi series) 05/14/2021    Diabetic microalbuminuria test  01/29/2022    Lipid screen  01/29/2022    A1C test (Diabetic or Prediabetic)  05/05/2022    Annual Wellness Visit (AWV)  05/27/2022    TSH testing  09/07/2022    Potassium monitoring  09/08/2022    Creatinine monitoring  09/08/2022    Diabetic foot exam  09/29/2022    Breast cancer screen  05/05/2023    Pneumococcal 65+ years Vaccine (2 of 2 - PPSV23) 11/11/2024    Cervical cancer screen  09/29/2026    Colon cancer screen colonoscopy  04/09/2031    DEXA (modify frequency per FRAX score)  Completed    Flu vaccine  Completed    Hepatitis C screen  Completed    HIV screen  Completed    Hepatitis A vaccine  Aged Out    Hib vaccine  Aged Out    Meningococcal (ACWY) vaccine  Aged Out             (applicable per patient's age: Cancer Screenings, Depression Screening, Fall Risk Screening, Immunizations)    Hemoglobin A1C (%)   Date Value   05/05/2021 6.8 (H)   01/29/2021 6.6 (H)   12/06/2019 7.1 (H)     Microalb/Crt.  Ratio (mcg/mg creat)   Date Value   01/29/2021 CANNOT BE CALCULATED     LDL Cholesterol (mg/dL)   Date Value   01/29/2021 38     LDL Calculated (mg/dL)   Date Value   04/13/2015 32     AST (U/L)   Date Value   09/08/2021 29     ALT (U/L)   Date Value   09/08/2021 38     BUN (mg/dL)   Date Value   09/08/2021 17      (goal A1C is < 7)   (goal LDL is <100) need 30-50% reduction from baseline     BP Readings from Last 3 Encounters:   09/29/21 110/68   07/30/21 130/84   05/26/21 118/82    (goal /80)      All Future Testing planned in [facility]:  Lab Frequency Next Occurrence   Lipid Panel Once 07/01/2022   Comprehensive Metabolic Panel Once 22/30/9259   Hemoglobin A1C Once 07/01/2022   TSH without Reflex Once 07/01/2022   T4, Free Once 07/01/2022   Uric Acid Once 07/01/2022   US PELVIS COMPLETE Once 05/26/2022       Next Visit Date:  Future Appointments   Date Time Provider Kati White   1/5/2022 10:00 AM Nabeel Mishra MD [facility]   2/2/2022 11:15 AM Nabeel Mishra MD [facility]            Patient Active Problem List:     Hypertension     Type 2 diabetes mellitus with diabetic polyneuropathy, without long-term current use of insulin ([address]     Hypothyroidism     Hyperlipidemia     Gastroesophageal reflux disease     Diabetic polyneuropathy associated with type 2 diabetes mellitus (HCC)     Chronic gout of multiple sites     Hiatal hernia     Polyp of colon     Chronic cough     Anxiety

## 2021-12-15 ENCOUNTER — HOSPITAL ENCOUNTER (OUTPATIENT)
Age: 66
Setting detail: SPECIMEN
Discharge: HOME | End: 2021-12-15

## 2021-12-15 DIAGNOSIS — E11.42 TYPE 2 DIABETES MELLITUS WITH DIABETIC POLYNEUROPATHY, WITHOUT LONG-TERM CURRENT USE OF INSULIN (HCC): ICD-10-CM

## 2021-12-15 DIAGNOSIS — E78.5 HYPERLIPIDEMIA, UNSPECIFIED HYPERLIPIDEMIA TYPE: ICD-10-CM

## 2021-12-15 DIAGNOSIS — M1A.09X0 CHRONIC GOUT OF MULTIPLE SITES, UNSPECIFIED CAUSE: ICD-10-CM

## 2021-12-15 DIAGNOSIS — E03.9 HYPOTHYROIDISM, UNSPECIFIED TYPE: ICD-10-CM

## 2021-12-15 LAB
ALBUMIN SERPL-MCNC: 4.4 G/DL (ref 3.5–5.2)
ALBUMIN/GLOBULIN RATIO: 1.7 (ref 1–2.5)
ALP BLD-CCNC: 69 U/L (ref 35–104)
ALT SERPL-CCNC: 44 U/L (ref 5–33)
ANION GAP SERPL CALCULATED.3IONS-SCNC: 16 MMOL/L (ref 9–17)
AST SERPL-CCNC: 38 U/L
BILIRUB SERPL-MCNC: 0.44 MG/DL (ref 0.3–1.2)
BUN BLDV-MCNC: 14 MG/DL (ref 8–23)
BUN/CREAT BLD: ABNORMAL (ref 9–20)
CALCIUM SERPL-MCNC: 10.1 MG/DL (ref 8.6–10.4)
CHLORIDE BLD-SCNC: 99 MMOL/L (ref 98–107)
CHOLESTEROL/HDL RATIO: 3.5
CHOLESTEROL: 127 MG/DL
CO2: 24 MMOL/L (ref 20–31)
CREAT SERPL-MCNC: 1.03 MG/DL (ref 0.5–0.9)
ESTIMATED AVERAGE GLUCOSE: 157 MG/DL
GFR AFRICAN AMERICAN: >60 ML/MIN
GFR NON-AFRICAN AMERICAN: 54 ML/MIN
GFR SERPL CREATININE-BSD FRML MDRD: ABNORMAL ML/MIN/{1.73_M2}
GFR SERPL CREATININE-BSD FRML MDRD: ABNORMAL ML/MIN/{1.73_M2}
GLUCOSE BLD-MCNC: 102 MG/DL (ref 70–99)
HBA1C MFR BLD: 7.1 % (ref 4–6)
HDLC SERPL-MCNC: 36 MG/DL
LDL CHOLESTEROL: 46 MG/DL (ref 0–130)
POTASSIUM SERPL-SCNC: 3.7 MMOL/L (ref 3.7–5.3)
SODIUM BLD-SCNC: 139 MMOL/L (ref 135–144)
THYROXINE, FREE: 2.01 NG/DL (ref 0.93–1.7)
TOTAL PROTEIN: 7 G/DL (ref 6.4–8.3)
TRIGL SERPL-MCNC: 227 MG/DL
TSH SERPL DL<=0.05 MIU/L-ACNC: 0.83 MIU/L (ref 0.3–5)
URIC ACID: 8 MG/DL (ref 2.4–5.7)
VLDLC SERPL CALC-MCNC: ABNORMAL MG/DL (ref 1–30)

## 2021-12-16 DIAGNOSIS — E03.9 HYPOTHYROIDISM, UNSPECIFIED TYPE: ICD-10-CM

## 2021-12-16 DIAGNOSIS — E78.5 HYPERLIPIDEMIA, UNSPECIFIED HYPERLIPIDEMIA TYPE: Primary | ICD-10-CM

## 2021-12-16 DIAGNOSIS — E11.42 TYPE 2 DIABETES MELLITUS WITH DIABETIC POLYNEUROPATHY, WITHOUT LONG-TERM CURRENT USE OF INSULIN (HCC): ICD-10-CM

## 2021-12-16 DIAGNOSIS — M1A.09X0 CHRONIC GOUT OF MULTIPLE SITES, UNSPECIFIED CAUSE: ICD-10-CM

## 2021-12-20 ENCOUNTER — PATIENT MESSAGE (OUTPATIENT)
Dept: FAMILY MEDICINE CLINIC | Age: 66
End: 2021-12-20

## 2021-12-20 DIAGNOSIS — M1A.09X0 CHRONIC GOUT OF MULTIPLE SITES, UNSPECIFIED CAUSE: ICD-10-CM

## 2021-12-21 RX ORDER — COLCHICINE 0.6 MG/1
0.6 TABLET ORAL DAILY
Qty: 30 TABLET | Refills: 5 | Status: SHIPPED | OUTPATIENT
Start: 2021-12-21 | End: 2022-07-21 | Stop reason: SDUPTHER

## 2021-12-21 NOTE — TELEPHONE ENCOUNTER
Last visit: 5-26-21  Last Med refill: 5-26-21  Does patient have enough medication for 72 hours: Yes    Next Visit Date:  Future Appointments   Date Time Provider Kati Cindy   1/5/2022 10:00 AM MD Juan Miguel Trejo [facility]   2/2/2022 11:15 AM MD Shane Trejo [facility] [address] Maintenance   Topic Date Due    Shingles Vaccine (1 of 2) Never done    Diabetic retinal exam  02/06/2020    COVID-19 Vaccine (2 - Booster for Magdi series) 05/14/2021    Diabetic microalbuminuria test  01/29/2022    Annual Wellness Visit (AWV)  05/27/2022    Diabetic foot exam  09/29/2022    A1C test (Diabetic or Prediabetic)  12/15/2022    Lipid screen  12/15/2022    TSH testing  12/15/2022    Potassium monitoring  12/15/2022    Creatinine monitoring  12/15/2022    Breast cancer screen  05/05/2023    Pneumococcal 65+ years Vaccine (2 of 2 - PPSV23) 11/11/2024    Cervical cancer screen  09/29/2026    Colon cancer screen colonoscopy  04/09/2031    DEXA (modify frequency per FRAX score)  Completed    Flu vaccine  Completed    Hepatitis C screen  Completed    HIV screen  Completed    Hepatitis A vaccine  Aged Out    Hib vaccine  Aged Out    Meningococcal (ACWY) vaccine  Aged Out       Hemoglobin A1C (%)   Date Value   12/15/2021 7.1 (H)   05/05/2021 6.8 (H)   01/29/2021 6.6 (H)             ( goal A1C is < 7)   Microalb/Crt.  Ratio (mcg/mg creat)   Date Value   01/29/2021 CANNOT BE CALCULATED     LDL Cholesterol (mg/dL)   Date Value   12/15/2021 46   01/29/2021 38     LDL Calculated (mg/dL)   Date Value   04/13/2015 32   07/22/2014 57       (goal LDL is <100)   AST (U/L)   Date Value   12/15/2021 38 (H)     ALT (U/L)   Date Value   12/15/2021 44 (H)     BUN (mg/dL)   Date Value   12/15/2021 14     BP Readings from Last 3 Encounters:   09/29/21 110/68   07/30/21 130/84   05/26/21 118/82          (goal 120/80)    All Future Testing planned in [facility]  Lab Frequency Next Occurrence   US PELVIS COMPLETE Once 05/26/2022   Lipid, Fasting Once 03/12/2022   Comprehensive Metabolic Panel Once 72/53/0247   Hemoglobin A1C Once 03/12/2022   CBC Once 03/12/2022   TSH without Reflex Once 03/12/2022   T4, Free Once 03/12/2022   Uric Acid Once 03/12/2022               Patient Active Problem List:     Hypertension     Type 2 diabetes mellitus with diabetic polyneuropathy, without long-term current use of insulin (HCC)     Hypothyroidism     Hyperlipidemia     Gastroesophageal reflux disease     Diabetic polyneuropathy associated with type 2 diabetes mellitus (HCC)     Chronic gout of multiple sites     Hiatal hernia     Polyp of colon     Chronic cough     Anxiety

## 2021-12-22 DIAGNOSIS — M1A.09X0 CHRONIC GOUT OF MULTIPLE SITES, UNSPECIFIED CAUSE: ICD-10-CM

## 2021-12-22 DIAGNOSIS — K44.9 HIATAL HERNIA: Primary | ICD-10-CM

## 2021-12-22 DIAGNOSIS — E78.5 HYPERLIPIDEMIA, UNSPECIFIED HYPERLIPIDEMIA TYPE: ICD-10-CM

## 2021-12-22 RX ORDER — ATORVASTATIN CALCIUM 40 MG/1
40 TABLET, FILM COATED ORAL DAILY
Qty: 90 TABLET | Refills: 1 | Status: SHIPPED | OUTPATIENT
Start: 2021-12-22 | End: 2022-12-22

## 2021-12-28 RX ORDER — SEMAGLUTIDE 1.34 MG/ML
0.25 INJECTION, SOLUTION SUBCUTANEOUS WEEKLY
Qty: 4 PEN | Refills: 1 | Status: CANCELLED | OUTPATIENT
Start: 2021-12-28 | End: 2022-12-28

## 2021-12-28 NOTE — TELEPHONE ENCOUNTER
Patient called into the office asking about the dosing on her Synthroid medication. The refill that was sent to the pharmacy was still for the old dosing not for the 137 mg. She would like to know for sure what dose you would like her to be taking. Please advise.

## 2021-12-29 NOTE — TELEPHONE ENCOUNTER
Spoke with pt she does not want to use Ozempic at this time. She also stated that she was told by us due to her lab work that her synthroid was going to be changed from 150 to 137 mcg.

## 2021-12-30 NOTE — TELEPHONE ENCOUNTER
It appears as though PARUL MCALLISTER spoke with the patient on 12/22 and informed her of results and recommendations but did not send any of the refills. There are several things. Please review my recommendations on her last labs and ensure she is aware of all of the recommendations made.   The send the prescriptions to me for approval.

## 2021-12-31 RX ORDER — ALLOPURINOL 100 MG/1
200 TABLET ORAL DAILY
Qty: 60 TABLET | Refills: 5 | Status: SHIPPED | OUTPATIENT
Start: 2021-12-31

## 2021-12-31 RX ORDER — LEVOTHYROXINE SODIUM 137 UG/1
137 TABLET ORAL DAILY
Qty: 90 TABLET | Refills: 1 | Status: SHIPPED | OUTPATIENT
Start: 2021-12-31 | End: 2022-07-11

## 2021-12-31 NOTE — TELEPHONE ENCOUNTER
Medications pending for allopurinol and levothyroxine. The lipitor was already sent on 12-22-21. Thyroid testing shows free thyroxine level is high indicating thyroid dosage is likely too high. Uric acid level remains elevated at 8.0.  I would like her to increase allopurinol to 200 mg once daily to get this under better control  Comprehensive panel shows blood sugar is okay at 102, kidney function is slightly decreased with creatinine at 1.03.  This is fairly stable from previous.  Liver functions are mildly elevated similar to before.  Electrolytes are normal  Cholesterol levels show total cholesterol is good at 127 with bad cholesterol great at 46.  HDL or good cholesterol is mildly low at 36 and triglycerides are elevated at 227  Hemoglobin A1c is elevated at 7.1 indicating average blood sugars have increased to 157     Recommend: Strict low-cholesterol diabetic diet with regular exercise.  Decrease Synthroid to 137 mcg once daily  Increase allopurinol to 200 mg once daily  Increase Lipitor to 40 mg once daily  Start Ozempic 0.25 mg injection once weekly if covered under insurance.  This will help to improve blood sugars and may help with weight reduction.     Recommend: Repeat labs in 3 months including fasting lipid, CMP, [id number], uric acid, CBC, free T4, TSH

## 2022-01-05 ENCOUNTER — OFFICE VISIT (OUTPATIENT)
Dept: FAMILY MEDICINE CLINIC | Age: 67
End: 2022-01-05
Payer: MEDICARE

## 2022-01-05 VITALS
SYSTOLIC BLOOD PRESSURE: 112 MMHG | DIASTOLIC BLOOD PRESSURE: 76 MMHG | WEIGHT: 173 LBS | TEMPERATURE: 97.5 F | RESPIRATION RATE: 15 BRPM | BODY MASS INDEX: 30.4 KG/M2 | HEART RATE: 61 BPM

## 2022-01-05 DIAGNOSIS — E11.42 TYPE 2 DIABETES MELLITUS WITH DIABETIC POLYNEUROPATHY, WITHOUT LONG-TERM CURRENT USE OF INSULIN (HCC): Primary | ICD-10-CM

## 2022-01-05 DIAGNOSIS — F41.9 ANXIETY: ICD-10-CM

## 2022-01-05 DIAGNOSIS — I10 ESSENTIAL HYPERTENSION: ICD-10-CM

## 2022-01-05 DIAGNOSIS — K44.9 HIATAL HERNIA: ICD-10-CM

## 2022-01-05 DIAGNOSIS — Z51.81 THERAPEUTIC DRUG MONITORING: ICD-10-CM

## 2022-01-05 DIAGNOSIS — E11.42 DIABETIC POLYNEUROPATHY ASSOCIATED WITH TYPE 2 DIABETES MELLITUS (HCC): ICD-10-CM

## 2022-01-05 DIAGNOSIS — E03.9 HYPOTHYROIDISM, UNSPECIFIED TYPE: ICD-10-CM

## 2022-01-05 DIAGNOSIS — E78.5 HYPERLIPIDEMIA, UNSPECIFIED HYPERLIPIDEMIA TYPE: ICD-10-CM

## 2022-01-05 DIAGNOSIS — K63.5 POLYP OF COLON, UNSPECIFIED PART OF COLON, UNSPECIFIED TYPE: ICD-10-CM

## 2022-01-05 DIAGNOSIS — M1A.09X0 CHRONIC GOUT OF MULTIPLE SITES, UNSPECIFIED CAUSE: ICD-10-CM

## 2022-01-05 DIAGNOSIS — U07.1 COVID-19: ICD-10-CM

## 2022-01-05 DIAGNOSIS — K21.9 GASTROESOPHAGEAL REFLUX DISEASE, UNSPECIFIED WHETHER ESOPHAGITIS PRESENT: ICD-10-CM

## 2022-01-05 LAB
ALCOHOL URINE: NEGATIVE
AMPHETAMINE SCREEN, URINE: NORMAL
BARBITURATE SCREEN, URINE: NEGATIVE
BENZODIAZEPINE SCREEN, URINE: NEGATIVE
BUPRENORPHINE URINE: NEGATIVE
COCAINE METABOLITE SCREEN URINE: NEGATIVE
FENTANYL SCREEN, URINE: NEGATIVE
GABAPENTIN SCREEN, URINE: NEGATIVE
MDMA URINE: NEGATIVE
METHADONE SCREEN, URINE: NEGATIVE
METHAMPHETAMINE, URINE: NEGATIVE
OPIATE SCREEN URINE: NEGATIVE
OXYCODONE SCREEN URINE: NEGATIVE
PHENCYCLIDINE SCREEN URINE: NEGATIVE
PROPOXYPHENE SCREEN, URINE: NEGATIVE
SYNTHETIC CANNABINOIDS(K2) SCREEN, URINE: NEGATIVE
THC SCREEN, URINE: NEGATIVE
TRAMADOL SCREEN URINE: NEGATIVE
TRICYCLIC ANTIDEPRESSANTS, UR: NEGATIVE

## 2022-01-05 PROCEDURE — 1036F TOBACCO NON-USER: CPT | Performed by: PEDIATRICS

## 2022-01-05 PROCEDURE — 3017F COLORECTAL CA SCREEN DOC REV: CPT | Performed by: PEDIATRICS

## 2022-01-05 PROCEDURE — 99215 OFFICE O/P EST HI 40 MIN: CPT | Performed by: PEDIATRICS

## 2022-01-05 PROCEDURE — 3046F HEMOGLOBIN A1C LEVEL >9.0%: CPT | Performed by: PEDIATRICS

## 2022-01-05 PROCEDURE — 1090F PRES/ABSN URINE INCON ASSESS: CPT | Performed by: PEDIATRICS

## 2022-01-05 PROCEDURE — 4040F PNEUMOC VAC/ADMIN/RCVD: CPT | Performed by: PEDIATRICS

## 2022-01-05 PROCEDURE — G8484 FLU IMMUNIZE NO ADMIN: HCPCS | Performed by: PEDIATRICS

## 2022-01-05 PROCEDURE — 2022F DILAT RTA XM EVC RTNOPTHY: CPT | Performed by: PEDIATRICS

## 2022-01-05 PROCEDURE — G8399 PT W/DXA RESULTS DOCUMENT: HCPCS | Performed by: PEDIATRICS

## 2022-01-05 PROCEDURE — G8417 CALC BMI ABV UP PARAM F/U: HCPCS | Performed by: PEDIATRICS

## 2022-01-05 PROCEDURE — 80305 DRUG TEST PRSMV DIR OPT OBS: CPT | Performed by: PEDIATRICS

## 2022-01-05 PROCEDURE — 1123F ACP DISCUSS/DSCN MKR DOCD: CPT | Performed by: PEDIATRICS

## 2022-01-05 PROCEDURE — G8427 DOCREV CUR MEDS BY ELIG CLIN: HCPCS | Performed by: PEDIATRICS

## 2022-01-05 RX ORDER — LORAZEPAM 0.5 MG/1
0.5 TABLET ORAL EVERY 8 HOURS PRN
Qty: 30 TABLET | Refills: 0 | Status: SHIPPED | OUTPATIENT
Start: 2022-01-05 | End: 2022-02-04

## 2022-01-05 RX ORDER — METHYLPREDNISOLONE 4 MG/1
TABLET ORAL
Qty: 1 KIT | Refills: 0 | Status: SHIPPED | OUTPATIENT
Start: 2022-01-05 | End: 2022-01-11

## 2022-01-05 ASSESSMENT — ENCOUNTER SYMPTOMS
ABDOMINAL PAIN: 0
EYE REDNESS: 0
SHORTNESS OF BREATH: 0
VOMITING: 0
SORE THROAT: 0
COLOR CHANGE: 0
WHEEZING: 0
VOICE CHANGE: 0
DIARRHEA: 0
EYE DISCHARGE: 0
CHEST TIGHTNESS: 0
CONSTIPATION: 0
RHINORRHEA: 0
EYE PAIN: 0

## 2022-01-05 NOTE — PROGRESS NOTES
Zadie Schwab (:  1955) is a 77 y.o. female,Established patient, here for evaluation of the following chief complaint(s):  Diabetes, Hypertension, and Hyperlipidemia         ASSESSMENT/PLAN:    1. Type 2 diabetes mellitus with diabetic polyneuropathy, without long-term current use of insulin ([facility]  2. Diabetic polyneuropathy associated with type 2 diabetes mellitus ([facility]  3. Essential hypertension  4. Hyperlipidemia, unspecified hyperlipidemia type  5. Hypothyroidism, unspecified type  6. Gastroesophageal reflux disease, unspecified whether esophagitis present  7. Hiatal hernia  8. Polyp of colon, unspecified part of colon, unspecified type  9. Chronic gout of multiple sites, unspecified cause  10. Anxiety  -     LORazepam (ATIVAN) 0.5 MG tablet; Take 1 tablet by mouth every 8 hours as needed for Anxiety for up to 30 days. , Disp-30 tablet, R-0Normal  11. Therapeutic drug monitoring  -     POCT Rapid Drug Screen  12. COVID-19  -     methylPREDNISolone (MEDROL, RASHEEDA,) 4 MG tablet; Take by mouth as instructed by packet. , Disp-1 kit, R-0Normal      Reviewed labs   Strict diabetic diet and regular exercise encouraged  Weight reduction encouraged  Recommend Ozempic for patient with like to try strict diabetic diet and exercise for 3 months before starting this  Yearly eye and foot exams recommended  Monitor blood pressure occasionally  Follow-up with cardiology as scheduled  Increase Lipitor to 40 mg once daily  Follow-up with GI as needed  Colonoscopy due in   Increase allopurinol to 200 mg once daily  Discontinue colchicine  Ativan only as needed for severe anxiety  Obtain POCT drug screen   CSM updated  Trial of Medrol Dosepak for lingering symptoms related to recent COVID infection  Shingles vaccine recommended   COVID vaccine recommended  Call with concerns       Return in about 3 months (around 2022) for routine follow up.          Subjective     HPI        Patient presents today for routine follow-up of her chronic medical problems including type 2 diabetes with neuropathy, hypertension, hyperlipidemia, hypothyroidism, GERD, hiatal hernia, colon polyps and gout. She also has a history of anxiety. She starts first by telling me that she just had COVID. She states that she is checking her blood sugars several times per week. They are usually around 130s to 140s. She does continue on metformin and recently had a hemoglobin A1c that was elevated. It was recommended that she start Ozempic but she does not wish to do this. She would like to get a hold on diet and exercise before adding medication. She is due for her eye exam.  She does have numbness and tingling in her toes likely secondary to diabetic neuropathy. She has thought that this was possibly secondary to her Colcrys in the past.  Her blood pressure is well controlled with her current medications. She does feel as though since having COVID she may be a little dehydrated because her blood pressures have been low. She was encouraged to increase her fluid intake. She does see Dr. Franky Beth for routine cardiac follow up. She does have a history of hyperlipidemia for which she takes atorvastatin and this was recently increased to 40 mg due to poor control of cholesterol levels on recent labs. She is tolerating this without side effects. Gwen Morelanding a history of hypothyroidism and has been taking her thyroid regularly. Her Synthroid was decreased after her recent labs. She does continue to take her pantoprazole for history of GERD. She did have an EGD with Dr. Janalyn Simmonds that showed a hiatal hernia. He advised her to remain on Protonix and to watch what she is eating. She had a colonoscopy on 4/9/2021 which showed 3 polyps. 1 was a hyperplastic polyp and 2 were tubular adenomas. Her next colonoscopy will be due in 2026. She does have a history of chronic gout treated with Colcrys.   She was started on allopurinol which was increased recently and attempts to get her off of the Colcrys. She has not had any acute flareups. She does have a history of anxiety and is using Ativan very infrequently for her history of this. She does ask for refill. She did see dermatology for a rash she had that was bothering her. She states that she originally saw them in [place] and then in a drained several times. They did burn a spot on her leg and gave her a cream for the rash that she was having. Apparently this was some allergic contact dermatitis. The rash is better. She reports that she tested positive for COVID on [address]. She has been monitoring her oxygen saturations which have been around 96-98. She does still have some congestion and cough but overall she is starting to improve. We discussed a trial of a Medrol Dosepak to help improve inflammation in her chest if she continues to cough. I did tell her that if she needs these these may temporarily worsen her blood sugars and she understands. She has no other concerns at this time. cmw      Review of Systems   Constitutional: Positive for fatigue. Negative for appetite change, chills and fever. HENT: Negative for congestion, ear pain, postnasal drip, rhinorrhea, sore throat and voice change. Eyes: Negative for pain, discharge, redness and visual disturbance. Respiratory: Positive for cough (since having covid ). Negative for chest tightness, shortness of breath and wheezing. Still with a lot of chest congestion   Cardiovascular: Negative for chest pain, palpitations and leg swelling. Gastrointestinal: Negative for abdominal pain, constipation, diarrhea and vomiting. GERD stable with protonix. Endocrine: Negative for polydipsia, polyphagia and polyuria. Genitourinary: Negative for decreased urine volume, dysuria, frequency, hematuria and urgency. Musculoskeletal: Positive for arthralgias. Negative for myalgias and neck pain.    Skin: Negative for color change and rash.   Allergic/Immunologic: Negative for immunocompromised state. Neurological: Positive for dizziness, light-headedness and numbness. Negative for weakness and headaches. Hematological: Negative for adenopathy. Does not bruise/bleed easily. Psychiatric/Behavioral: Negative for dysphoric mood and sleep disturbance. The patient is nervous/anxious. Objective      Physical Exam  Vitals and nursing note reviewed. Constitutional:       General: She is not in acute distress. Appearance: Normal appearance. She is well-developed. She is not ill-appearing, toxic-appearing or diaphoretic. Comments: obese   HENT:      Head: Normocephalic. Right Ear: Tympanic membrane, ear canal and external ear normal. There is no impacted cerumen. Left Ear: Tympanic membrane, ear canal and external ear normal. There is no impacted cerumen. Nose: Nose normal. No congestion or rhinorrhea. Mouth/Throat:      Mouth: Mucous membranes are moist.      Pharynx: No oropharyngeal exudate. Eyes:      General: No scleral icterus. Right eye: No discharge. Left eye: No discharge. Conjunctiva/sclera: Conjunctivae normal.      Pupils: Pupils are equal, round, and reactive to light. Neck:      Thyroid: No thyromegaly. Vascular: No JVD. Cardiovascular:      Rate and Rhythm: Normal rate and regular rhythm. Pulses: Normal pulses. Heart sounds: Normal heart sounds. No murmur heard. Pulmonary:      Effort: Pulmonary effort is normal. No respiratory distress. Breath sounds: Rhonchi (some scattered rhonchi) present. No decreased breath sounds, wheezing or rales. Comments: Occasional loose cough  Chest:      Chest wall: No tenderness. Abdominal:      General: Bowel sounds are normal.      Palpations: Abdomen is soft. There is no mass. Tenderness: There is no abdominal tenderness. There is no right CVA tenderness or left CVA tenderness.    Musculoskeletal: General: No tenderness. Cervical back: Normal range of motion and neck supple. Right lower leg: No edema. Left lower leg: No edema. Lymphadenopathy:      Cervical: No cervical adenopathy. Skin:     General: Skin is warm. Capillary Refill: Capillary refill takes less than 2 seconds. Coloration: Skin is not pale. Findings: No erythema or rash. Neurological:      Mental Status: She is alert and oriented to person, place, and time. Cranial Nerves: No cranial nerve deficit. Motor: No abnormal muscle tone. Coordination: Coordination normal.      Deep Tendon Reflexes: Reflexes are normal and symmetric. Psychiatric:         Behavior: Behavior normal.         Thought Content: Thought content normal.         Judgment: Judgment normal.            On this date 1/5/2022 I have spent over 40 minutes reviewing previous notes, test results and face to face with the patient discussing the diagnosis and importance of compliance with the treatment plan as well as documenting on the day of the visit. An electronic signature was used to authenticate this note.     --Janette Fernandez MD

## 2022-01-11 ASSESSMENT — ENCOUNTER SYMPTOMS: COUGH: 1

## 2022-02-02 ENCOUNTER — VIRTUAL VISIT (OUTPATIENT)
Dept: FAMILY MEDICINE CLINIC | Age: 67
End: 2022-02-02
Payer: MEDICARE

## 2022-02-02 DIAGNOSIS — R05.3 CHRONIC COUGH: ICD-10-CM

## 2022-02-02 DIAGNOSIS — E78.5 HYPERLIPIDEMIA, UNSPECIFIED HYPERLIPIDEMIA TYPE: ICD-10-CM

## 2022-02-02 DIAGNOSIS — M1A.09X0 CHRONIC GOUT OF MULTIPLE SITES, UNSPECIFIED CAUSE: ICD-10-CM

## 2022-02-02 DIAGNOSIS — F41.9 ANXIETY: ICD-10-CM

## 2022-02-02 DIAGNOSIS — K63.5 POLYP OF COLON, UNSPECIFIED PART OF COLON, UNSPECIFIED TYPE: ICD-10-CM

## 2022-02-02 DIAGNOSIS — E11.42 TYPE 2 DIABETES MELLITUS WITH DIABETIC POLYNEUROPATHY, WITHOUT LONG-TERM CURRENT USE OF INSULIN (HCC): Primary | ICD-10-CM

## 2022-02-02 DIAGNOSIS — K44.9 HIATAL HERNIA: ICD-10-CM

## 2022-02-02 DIAGNOSIS — K21.9 GASTROESOPHAGEAL REFLUX DISEASE, UNSPECIFIED WHETHER ESOPHAGITIS PRESENT: ICD-10-CM

## 2022-02-02 DIAGNOSIS — E11.42 DIABETIC POLYNEUROPATHY ASSOCIATED WITH TYPE 2 DIABETES MELLITUS (HCC): ICD-10-CM

## 2022-02-02 DIAGNOSIS — E03.9 HYPOTHYROIDISM, UNSPECIFIED TYPE: ICD-10-CM

## 2022-02-02 DIAGNOSIS — I10 ESSENTIAL HYPERTENSION: ICD-10-CM

## 2022-02-02 PROCEDURE — 1090F PRES/ABSN URINE INCON ASSESS: CPT | Performed by: PEDIATRICS

## 2022-02-02 PROCEDURE — G8427 DOCREV CUR MEDS BY ELIG CLIN: HCPCS | Performed by: PEDIATRICS

## 2022-02-02 PROCEDURE — G8399 PT W/DXA RESULTS DOCUMENT: HCPCS | Performed by: PEDIATRICS

## 2022-02-02 PROCEDURE — 99214 OFFICE O/P EST MOD 30 MIN: CPT | Performed by: PEDIATRICS

## 2022-02-02 PROCEDURE — 2022F DILAT RTA XM EVC RTNOPTHY: CPT | Performed by: PEDIATRICS

## 2022-02-02 PROCEDURE — 3046F HEMOGLOBIN A1C LEVEL >9.0%: CPT | Performed by: PEDIATRICS

## 2022-02-02 PROCEDURE — 4040F PNEUMOC VAC/ADMIN/RCVD: CPT | Performed by: PEDIATRICS

## 2022-02-02 PROCEDURE — 3017F COLORECTAL CA SCREEN DOC REV: CPT | Performed by: PEDIATRICS

## 2022-02-02 PROCEDURE — 1123F ACP DISCUSS/DSCN MKR DOCD: CPT | Performed by: PEDIATRICS

## 2022-02-02 RX ORDER — LEVOTHYROXINE SODIUM 137 MCG
137 TABLET ORAL DAILY
Qty: 90 TABLET | Refills: 1 | Status: SHIPPED | OUTPATIENT
Start: 2022-02-02 | End: 2022-05-29

## 2022-02-02 ASSESSMENT — ENCOUNTER SYMPTOMS
VOMITING: 0
ABDOMINAL PAIN: 1
COUGH: 1
ABDOMINAL DISTENTION: 1
EYE PAIN: 0
RHINORRHEA: 0
EYE DISCHARGE: 0
CONSTIPATION: 0
COLOR CHANGE: 0
EYE REDNESS: 0
DIARRHEA: 0
CHEST TIGHTNESS: 0
VOICE CHANGE: 0
WHEEZING: 0
NAUSEA: 1
SORE THROAT: 0
SHORTNESS OF BREATH: 0

## 2022-02-02 NOTE — PROGRESS NOTES
Ailyn Lozano (:  1955) is a Established patient, here for evaluation of the following:    Assessment & Plan        Below is the assessment and plan developed based on review of pertinent history, physical exam, labs, studies, and medications. 1. Type 2 diabetes mellitus with diabetic polyneuropathy, without long-term current use of insulin (HCC)  -     Microalbumin, Ur; Future  2. Diabetic polyneuropathy associated with type 2 diabetes mellitus ([address]  3. Essential hypertension  4. Hyperlipidemia, unspecified hyperlipidemia type  5. Hypothyroidism, unspecified type  -     SYNTHROID 137 MCG tablet; Take 1 tablet by mouth Daily, Disp-90 tablet, R-1, DAWNormal  6. Gastroesophageal reflux disease, unspecified whether esophagitis present  7. Hiatal hernia  8. Polyp of colon, unspecified part of colon, unspecified type  9. Chronic gout of multiple sites, unspecified cause  10. Anxiety  11. Chronic cough      Reviewed recent labs  Repeat labs in March  Continue same medications  Strict diabetic diet and regular exercise encouraged  Weight reduction encouraged  Consider Ozempic  Yearly eye and foot exams recommended  Monitor blood pressure occasionally  Follow-up with cardiology as scheduled  Continue Lipitor 40 mg once daily  Continue pantoprazole and take at night - 1/2 hour before dinner  Pepcid once daily prn   Follow-up with GI when due  Colonoscopy due in   Start allopurinol at 200 mg once daily  Ativan only as needed for severe anxiety  Mammogram due in May  Dexascan due in the Winter 2022  Shingles vaccine recommended  Covid booster recommended  Call with concerns        Return in about 6 weeks (around 3/16/2022) for routine follow up. Subjective      HPI     Patient presents today for routine follow-up of her chronic medical problems including type 2 diabetes with neuropathy, hypertension, hyperlipidemia, hypothyroidism, GERD, hiatal hernia, colon polyps and gout.   She also has a history of anxiety. She did just recently have Covid and she is finally feeling better from this. She states that she is checking her blood sugars intermittently several times weekly. She states that they have been doing well and still remaining about the 130-140 range. She does continue on Metformin her most recent hemoglobin A1c was 7.1 in December. It was recommended that she start on Ozempic but she did not want to do this so she has been maintained on Metformin, healthy diet and regular exercise. She is due for her eye exam and was reminded to schedule this. She does continue to have numbness and tingling in her toes likely secondary to diabetic neuropathy. She had thought that this was possibly secondary to her Colcrys which she continues. Her blood pressure is normally well controlled with her current medications. Her blood pressure is elevated today but she was having a difficult time getting on the virtual visit with her  and he made her somewhat anxious. She does see Dr. Villanueva regularly for routine cardiac follow-up. She does need to schedule an appointment with him but has been having a hard time getting in. She does have a history of hyperlipidemia for which she takes a atorvastatin. This was recently increased because of her cholesterol levels being uncontrolled but she had just filled a prescription for 90 days of the 20 mg so she is going to increase this to 40 mg soon. She is tolerating this without side effects. She does have a history of hypothyroidism that is treated with Synthroid. Her Synthroid was recently decreased to 137 mcg but unfortunately she got the generic brand from the pharmacy and she has not been able to tolerate this very well. She had been having some palpitations/fluttery sensation so she went back to her dispense as written 150 mcg tablets. I did send a new prescription for Synthroid 137 mcg dispense as written to her pharmacy.   She will have labs done in the next several months. She does continue to take pantoprazole for her history of GERD. She did have an EGD with Dr. Julia Flood that showed a 1 cm hiatal hernia. He advised her to remain on Protonix and watch what she is eating. She still continues to have some chest discomfort which she thinks is secondary to her GERD. She also gets a lot of belching. Since having Covid she has been having a lot of stomach issues. I did tell her that we could try to change her Metformin to see if this is part of the reason for her symptoms and she does not wish to do that at this time. She did have a colonoscopy on 4/9/2021 and this showed 3 polyps with one being a hyperplastic polyp and 2 being tubular adenomas. Her next colonoscopy will be due in 2026. She does have a history of chronic gout that is treated with Colcrys and allopurinol. Allopurinol was increased to 200 mg however she has not increased this yet because of her stomach issues. She is going to do this soon. She has not had any acute flareups of gout. She does have a history of anxiety and uses Ativan very infrequently for her history of this. She does report that her cough from Covid is finally better. She does still get a cough here and there but she does feel as though this is from her GERD. She states that she was having some pain several nights ago that almost took her to the ER but it did finally settle down. She is taking Tums with anti-gas and this seems to be helpful. She has been really only eating small amounts and usually just yogurt, toast and pretzel last night. She does not wish to pursue any further testing at this time but will let me know if her symptoms worsen and if her symptoms persist at her next visit we will discuss further testing/work-up. She has no other concerns at this time. cmw          Review of Systems   Constitutional: Positive for fatigue. Negative for appetite change, chills and fever.    HENT: Negative for congestion, ear pain, postnasal drip, rhinorrhea, sore throat and voice change. Eyes: Negative for pain, discharge, redness and visual disturbance. Respiratory: Positive for cough. Negative for chest tightness, shortness of breath and wheezing. Cardiovascular: Positive for palpitations. Negative for chest pain and leg swelling. Gastrointestinal: Positive for abdominal distention, abdominal pain and nausea. Negative for constipation, diarrhea and vomiting. GERD stable with protonix. Endocrine: Negative for polydipsia, polyphagia and polyuria. Genitourinary: Negative for decreased urine volume, dysuria, frequency, hematuria and urgency. Musculoskeletal: Positive for arthralgias. Negative for myalgias and neck pain. Skin: Negative for color change and rash. Allergic/Immunologic: Negative for immunocompromised state. Neurological: Positive for numbness. Negative for dizziness, weakness, light-headedness and headaches. Hematological: Negative for adenopathy. Does not bruise/bleed easily. Psychiatric/Behavioral: Negative for dysphoric mood and sleep disturbance. The patient is nervous/anxious.            Objective      Patient-Reported Vitals  Patient-Reported Systolic (Top): 038 mmHg  Patient-Reported Diastolic (Bottom): 97 mmHg  Patient-Reported Pulse: 64  Patient-Reported Weight: 168 lb           [INSTRUCTIONS:  \"[x]\" Indicates a positive item  \"[]\" Indicates a negative item  -- DELETE ALL ITEMS NOT EXAMINED]    Constitutional: [x] Appears well-developed and well-nourished [x] No apparent distress      [] Abnormal -     Mental status: [x] Alert and awake  [x] Oriented to person/place/time [x] Able to follow commands    [] Abnormal -     Eyes:   EOM    [x]  Normal    [] Abnormal -   Sclera  [x]  Normal    [] Abnormal -          Discharge [x]  None visible   [] Abnormal -     HENT: [x] Normocephalic, atraumatic  [] Abnormal -   [x] Mouth/Throat: Mucous membranes are moist    External Ears [x] Normal  [] Abnormal -    Neck: [x] No visualized mass [] Abnormal -     Pulmonary/Chest: [x] Respiratory effort normal   [x] No visualized signs of difficulty breathing or respiratory distress        [] Abnormal -      Musculoskeletal:   [x] Normal gait with no signs of ataxia         [x] Normal range of motion of neck        [] Abnormal -     Neurological:        [x] No Facial Asymmetry (Cranial nerve 7 motor function) (limited exam due to video visit)          [x] No gaze palsy        [] Abnormal -          Skin:        [x] No significant exanthematous lesions or discoloration noted on facial skin         [] Abnormal -            Psychiatric:       [x] Normal Affect [x] Abnormal -  Mildly anxious       [x] No Hallucinations    Other pertinent observable physical exam findings:-       On this date 2/2/2022 I have spent over 30 minutes reviewing previous notes, test results and face to face (virtual) with the patient discussing the diagnosis and importance of compliance with the treatment plan as well as documenting on the day of the visit. Allison Renu, was evaluated through a synchronous (real-time) audio-video encounter. The patient (or guardian if applicable) is aware that this is a billable service, which includes applicable co-pays. This Virtual Visit was conducted with patient's (and/or legal guardian's) consent. The visit was conducted pursuant to the emergency declaration under the [address] [place] [address] waThe [facility] authority and the [employer] General Act. Patient identification was verified, and a caregiver was present when appropriate. The patient was located at home in a state where the provider was licensed to provide care.        --Paige Peoples MD

## 2022-04-11 DIAGNOSIS — E11.42 TYPE 2 DIABETES MELLITUS WITH DIABETIC POLYNEUROPATHY, WITHOUT LONG-TERM CURRENT USE OF INSULIN (HCC): ICD-10-CM

## 2022-04-11 RX ORDER — METFORMIN HYDROCHLORIDE 500 MG/1
TABLET, EXTENDED RELEASE ORAL
Qty: 270 TABLET | Refills: 1 | Status: SHIPPED | OUTPATIENT
Start: 2022-04-11 | End: 2022-10-05

## 2022-04-11 RX ORDER — ATORVASTATIN CALCIUM 20 MG/1
TABLET, FILM COATED ORAL
Qty: 90 TABLET | Refills: 1 | Status: SHIPPED | OUTPATIENT
Start: 2022-04-11 | End: 2022-10-05

## 2022-04-11 NOTE — TELEPHONE ENCOUNTER
Last visit: 01/05/22  Last Med refill: atorvastatin 12/22/21, metformin 11/03/21  Does patient have enough medication for 72 hours: Yes    Next Visit Date:  Future Appointments   Date Time Provider Kati White   5/18/2022 10:30 AM MD Radha Almaraz [facility] [address] Maintenance   Topic Date Due    Shingles Vaccine (1 of 2) Never done    Diabetic retinal exam  02/06/2020    COVID-19 Vaccine (2 - Booster for Magdi series) 05/14/2021    Diabetic microalbuminuria test  01/29/2022    Depression Screen  05/26/2022    Annual Wellness Visit (AWV)  05/27/2022    Diabetic foot exam  09/29/2022    A1C test (Diabetic or Prediabetic)  12/15/2022    Lipid screen  12/15/2022    TSH testing  12/15/2022    Potassium monitoring  12/15/2022    Creatinine monitoring  12/15/2022    Breast cancer screen  05/05/2023    Pneumococcal 65+ years Vaccine (2 of 2 - PPSV23) 11/11/2024    Colorectal Cancer Screen  04/09/2031    DEXA (modify frequency per FRAX score)  Completed    Flu vaccine  Completed    Hepatitis C screen  Completed    Hepatitis A vaccine  Aged Out    Hib vaccine  Aged Out    Meningococcal (ACWY) vaccine  Aged Out       Hemoglobin A1C (%)   Date Value   12/15/2021 7.1 (H)   05/05/2021 6.8 (H)   01/29/2021 6.6 (H)             ( goal A1C is < 7)   Microalb/Crt.  Ratio (mcg/mg creat)   Date Value   01/29/2021 CANNOT BE CALCULATED     LDL Cholesterol (mg/dL)   Date Value   12/15/2021 46   01/29/2021 38     LDL Calculated (mg/dL)   Date Value   04/13/2015 32   07/22/2014 57       (goal LDL is <100)   AST (U/L)   Date Value   12/15/2021 38 (H)     ALT (U/L)   Date Value   12/15/2021 44 (H)     BUN (mg/dL)   Date Value   12/15/2021 14     BP Readings from Last 3 Encounters:   01/05/22 112/76   09/29/21 110/68   07/30/21 130/84          (goal 120/80)    All Future Testing planned in [facility]  Lab Frequency Next Occurrence   US PELVIS COMPLETE Once 05/26/2022   Lipid, Fasting Once 03/12/2022   Comprehensive Metabolic Panel Once 49/68/7304   Hemoglobin A1C Once 03/12/2022   CBC Once 03/12/2022   TSH without Reflex Once 03/12/2022   T4, Free Once 03/12/2022   Uric Acid Once 03/12/2022   Microalbumin, Ur Once 03/12/2022               Patient Active Problem List:     Hypertension     Type 2 diabetes mellitus with diabetic polyneuropathy, without long-term current use of insulin (HCC)     Hypothyroidism     Hyperlipidemia     Gastroesophageal reflux disease     Diabetic polyneuropathy associated with type 2 diabetes mellitus (HCC)     Chronic gout of multiple sites     Hiatal hernia     Polyp of colon     Chronic cough     Anxiety

## 2022-04-19 ENCOUNTER — PATIENT MESSAGE (OUTPATIENT)
Dept: FAMILY MEDICINE CLINIC | Age: 67
End: 2022-04-19

## 2022-04-20 RX ORDER — CARVEDILOL 25 MG/1
25 TABLET ORAL 2 TIMES DAILY
Qty: 180 TABLET | Refills: 1 | Status: SHIPPED | OUTPATIENT
Start: 2022-04-20 | End: 2022-10-05

## 2022-04-20 NOTE — TELEPHONE ENCOUNTER
From: Rhina Choe  To: Dr. Sneha Brody: 4/19/2022 4:17 PM EDT  Subject: Refill     Please ok a refill for my carvedilol. I phoned in for it to be refilled almost 2 weeks ago and the pharmacy said they have sent a request to the doctors office at least 2 times. I am out of them! Thank you.

## 2022-04-20 NOTE — TELEPHONE ENCOUNTER
LOV 2-2-22    Health Maintenance   Topic Date Due    Shingles Vaccine (1 of 2) Never done    Diabetic retinal exam  02/06/2020    COVID-19 Vaccine (2 - Booster for Magdi series) 05/14/2021    Diabetic microalbuminuria test  01/29/2022    Depression Screen  05/26/2022    Annual Wellness Visit (AWV)  05/27/2022    Diabetic foot exam  09/29/2022    A1C test (Diabetic or Prediabetic)  12/15/2022    Lipids  12/15/2022    TSH  12/15/2022    Potassium  12/15/2022    Creatinine  12/15/2022    Breast cancer screen  05/05/2023    Pneumococcal 65+ years Vaccine (3 - PPSV23 or PCV20) 11/11/2024    Colorectal Cancer Screen  04/09/2031    DEXA (modify frequency per FRAX score)  Completed    Flu vaccine  Completed    Hepatitis C screen  Completed    Hepatitis A vaccine  Aged Out    Hib vaccine  Aged Out    Meningococcal (ACWY) vaccine  Aged Out             (applicable per patient's age: Cancer Screenings, Depression Screening, Fall Risk Screening, Immunizations)    Hemoglobin A1C (%)   Date Value   12/15/2021 7.1 (H)   05/05/2021 6.8 (H)   01/29/2021 6.6 (H)     Microalb/Crt.  Ratio (mcg/mg creat)   Date Value   01/29/2021 CANNOT BE CALCULATED     LDL Cholesterol (mg/dL)   Date Value   12/15/2021 46     LDL Calculated (mg/dL)   Date Value   04/13/2015 32     AST (U/L)   Date Value   12/15/2021 38 (H)     ALT (U/L)   Date Value   12/15/2021 44 (H)     BUN (mg/dL)   Date Value   12/15/2021 14      (goal A1C is < 7)   (goal LDL is <100) need 30-50% reduction from baseline     BP Readings from Last 3 Encounters:   01/05/22 112/76   09/29/21 110/68   07/30/21 130/84    (goal /80)      All Future Testing planned in [facility]:  Lab Frequency Next Occurrence   US PELVIS COMPLETE Once 05/26/2022   Lipid, Fasting Once 03/12/2022   Comprehensive Metabolic Panel Once 16/13/0935   Hemoglobin A1C Once 03/12/2022   CBC Once 03/12/2022   TSH without Reflex Once 03/12/2022   T4, Free Once 03/12/2022   Uric Acid Once 03/12/2022   Microalbumin, Ur Once 03/12/2022       Next Visit Date:  Future Appointments   Date Time Provider Kati White   5/18/2022 10:30 AM MD Jessica Almaraz            Patient Active Problem List:     Hypertension     Type 2 diabetes mellitus with diabetic polyneuropathy, without long-term current use of insulin ([facility] [address]     Hypothyroidism     Hyperlipidemia     Gastroesophageal reflux disease     Diabetic polyneuropathy associated with type 2 diabetes mellitus (HCC)     Chronic gout of multiple sites     Hiatal hernia     Polyp of colon     Chronic cough     Anxiety

## 2022-05-11 DIAGNOSIS — I10 ESSENTIAL HYPERTENSION: ICD-10-CM

## 2022-05-11 DIAGNOSIS — K21.9 GASTROESOPHAGEAL REFLUX DISEASE: ICD-10-CM

## 2022-05-11 RX ORDER — PANTOPRAZOLE SODIUM 40 MG/1
TABLET, DELAYED RELEASE ORAL
Qty: 90 TABLET | Refills: 1 | Status: SHIPPED | OUTPATIENT
Start: 2022-05-11

## 2022-05-11 RX ORDER — LOSARTAN POTASSIUM AND HYDROCHLOROTHIAZIDE 25; 100 MG/1; MG/1
1 TABLET ORAL DAILY
Qty: 30 TABLET | Refills: 5 | Status: SHIPPED | OUTPATIENT
Start: 2022-05-11 | End: 2023-05-11

## 2022-05-11 NOTE — TELEPHONE ENCOUNTER
Last visit: 01/05/22  Last Med refill: 11/26/21  Does patient have enough medication for 72 hours: Yes    Next Visit Date:  Future Appointments   Date Time Provider Kati White   5/18/2022 10:30 AM [facility]upali Navarro [facility] [address] Maintenance   Topic Date Due    Shingles vaccine (1 of 2) Never done    Diabetic retinal exam  02/06/2020    COVID-19 Vaccine (2 - Booster for Magdi series) 05/14/2021    Diabetic microalbuminuria test  01/29/2022    Depression Screen  05/26/2022    Annual Wellness Visit (AWV)  05/27/2022    Diabetic foot exam  09/29/2022    A1C test (Diabetic or Prediabetic)  12/15/2022    Lipids  12/15/2022    Breast cancer screen  05/05/2023    Pneumococcal 65+ years Vaccine (3 - PPSV23 or PCV20) 11/11/2024    Colorectal Cancer Screen  04/09/2031    DEXA (modify frequency per FRAX score)  Completed    Flu vaccine  Completed    Hepatitis C screen  Completed    Hepatitis A vaccine  Aged Out    Hib vaccine  Aged Out    Meningococcal (ACWY) vaccine  Aged Out       Hemoglobin A1C (%)   Date Value   12/15/2021 7.1 (H)   05/05/2021 6.8 (H)   01/29/2021 6.6 (H)             ( goal A1C is < 7)   Microalb/Crt.  Ratio (mcg/mg creat)   Date Value   01/29/2021 CANNOT BE CALCULATED     LDL Cholesterol (mg/dL)   Date Value   12/15/2021 46   01/29/2021 38     LDL Calculated (mg/dL)   Date Value   04/13/2015 32   07/22/2014 57       (goal LDL is <100)   AST (U/L)   Date Value   12/15/2021 38 (H)     ALT (U/L)   Date Value   12/15/2021 44 (H)     BUN (mg/dL)   Date Value   12/15/2021 14     BP Readings from Last 3 Encounters:   01/05/22 112/76   09/29/21 110/68   07/30/21 130/84          (goal 120/80)    All Future Testing planned in [facility]  Lab Frequency Next Occurrence   US PELVIS COMPLETE Once 05/26/2022   Lipid, Fasting Once 03/12/2022   Comprehensive Metabolic Panel Once 82/36/2871   Hemoglobin A1C Once 03/12/2022   CBC Once 03/12/2022   TSH without Reflex Once 03/12/2022   T4, Free Once 03/12/2022   Uric Acid Once 03/12/2022   Microalbumin, Ur Once 03/12/2022               Patient Active Problem List:     Hypertension     Type 2 diabetes mellitus with diabetic polyneuropathy, without long-term current use of insulin (HCC)     Hypothyroidism     Hyperlipidemia     Gastroesophageal reflux disease     Diabetic polyneuropathy associated with type 2 diabetes mellitus (HCC)     Chronic gout of multiple sites     Hiatal hernia     Polyp of colon     Chronic cough     Anxiety

## 2022-05-11 NOTE — TELEPHONE ENCOUNTER
Last visit: 01/05/22  Last Med refill: 11/03/21  Does patient have enough medication for 72 hours: Yes    Next Visit Date:  Future Appointments   Date Time Provider Kati White   5/18/2022 10:30 AM [facility] [address] Maintenance   Topic Date Due    Shingles vaccine (1 of 2) Never done    Diabetic retinal exam  02/06/2020    COVID-19 Vaccine (2 - Booster for Magdi series) 05/14/2021    Diabetic microalbuminuria test  01/29/2022    Depression Screen  05/26/2022    Annual Wellness Visit (AWV)  05/27/2022    Diabetic foot exam  09/29/2022    A1C test (Diabetic or Prediabetic)  12/15/2022    Lipids  12/15/2022    Breast cancer screen  05/05/2023    Pneumococcal 65+ years Vaccine (3 - PPSV23 or PCV20) 11/11/2024    Colorectal Cancer Screen  04/09/2031    DEXA (modify frequency per FRAX score)  Completed    Flu vaccine  Completed    Hepatitis C screen  Completed    Hepatitis A vaccine  Aged Out    Hib vaccine  Aged Out    Meningococcal (ACWY) vaccine  Aged Out       Hemoglobin A1C (%)   Date Value   12/15/2021 7.1 (H)   05/05/2021 6.8 (H)   01/29/2021 6.6 (H)             ( goal A1C is < 7)   Microalb/Crt.  Ratio (mcg/mg creat)   Date Value   01/29/2021 CANNOT BE CALCULATED     LDL Cholesterol (mg/dL)   Date Value   12/15/2021 46   01/29/2021 38     LDL Calculated (mg/dL)   Date Value   04/13/2015 32   07/22/2014 57       (goal LDL is <100)   AST (U/L)   Date Value   12/15/2021 38 (H)     ALT (U/L)   Date Value   12/15/2021 44 (H)     BUN (mg/dL)   Date Value   12/15/2021 14     BP Readings from Last 3 Encounters:   01/05/22 112/76   09/29/21 110/68   07/30/21 130/84          (goal 120/80)    All Future Testing planned in [facility]  Lab Frequency Next Occurrence   US PELVIS COMPLETE Once 05/26/2022   Lipid, Fasting Once 03/12/2022   Comprehensive Metabolic Panel Once 87/45/2320   Hemoglobin A1C Once 03/12/2022   CBC Once 03/12/2022   TSH without Reflex Once 03/12/2022   T4, Free Once 03/12/2022   Uric Acid Once 03/12/2022   Microalbumin, Ur Once 03/12/2022               Patient Active Problem List:     Hypertension     Type 2 diabetes mellitus with diabetic polyneuropathy, without long-term current use of insulin (HCC)     Hypothyroidism     Hyperlipidemia     Gastroesophageal reflux disease     Diabetic polyneuropathy associated with type 2 diabetes mellitus (HCC)     Chronic gout of multiple sites     Hiatal hernia     Polyp of colon     Chronic cough     Anxiety

## 2022-05-18 ENCOUNTER — HOSPITAL ENCOUNTER (OUTPATIENT)
Age: 67
Setting detail: SPECIMEN
Discharge: HOME | End: 2022-05-18

## 2022-05-18 ENCOUNTER — OFFICE VISIT (OUTPATIENT)
Dept: FAMILY MEDICINE CLINIC | Age: 67
End: 2022-05-18
Payer: MEDICARE

## 2022-05-18 DIAGNOSIS — Z12.31 ENCOUNTER FOR SCREENING MAMMOGRAM FOR MALIGNANT NEOPLASM OF BREAST: ICD-10-CM

## 2022-05-18 DIAGNOSIS — E78.5 HYPERLIPIDEMIA, UNSPECIFIED HYPERLIPIDEMIA TYPE: ICD-10-CM

## 2022-05-18 DIAGNOSIS — E03.9 HYPOTHYROIDISM, UNSPECIFIED TYPE: ICD-10-CM

## 2022-05-18 DIAGNOSIS — E11.42 TYPE 2 DIABETES MELLITUS WITH DIABETIC POLYNEUROPATHY, WITHOUT LONG-TERM CURRENT USE OF INSULIN (HCC): Primary | ICD-10-CM

## 2022-05-18 DIAGNOSIS — M1A.09X0 CHRONIC GOUT OF MULTIPLE SITES, UNSPECIFIED CAUSE: ICD-10-CM

## 2022-05-18 DIAGNOSIS — E11.42 DIABETIC POLYNEUROPATHY ASSOCIATED WITH TYPE 2 DIABETES MELLITUS (HCC): ICD-10-CM

## 2022-05-18 DIAGNOSIS — K21.9 GASTROESOPHAGEAL REFLUX DISEASE, UNSPECIFIED WHETHER ESOPHAGITIS PRESENT: ICD-10-CM

## 2022-05-18 DIAGNOSIS — I10 ESSENTIAL HYPERTENSION: ICD-10-CM

## 2022-05-18 DIAGNOSIS — K63.5 POLYP OF COLON, UNSPECIFIED PART OF COLON, UNSPECIFIED TYPE: ICD-10-CM

## 2022-05-18 DIAGNOSIS — F41.9 ANXIETY: ICD-10-CM

## 2022-05-18 DIAGNOSIS — K44.9 HIATAL HERNIA: ICD-10-CM

## 2022-05-18 DIAGNOSIS — E11.42 TYPE 2 DIABETES MELLITUS WITH DIABETIC POLYNEUROPATHY, WITHOUT LONG-TERM CURRENT USE OF INSULIN (HCC): ICD-10-CM

## 2022-05-18 LAB
ALBUMIN SERPL-MCNC: 4.5 G/DL (ref 3.5–5.2)
ALBUMIN/GLOBULIN RATIO: 1.8 (ref 1–2.5)
ALP BLD-CCNC: 70 U/L (ref 35–104)
ALT SERPL-CCNC: 44 U/L (ref 5–33)
ANION GAP SERPL CALCULATED.3IONS-SCNC: 13 MMOL/L (ref 9–17)
AST SERPL-CCNC: 49 U/L
BILIRUB SERPL-MCNC: 0.38 MG/DL (ref 0.3–1.2)
BUN BLDV-MCNC: 15 MG/DL (ref 8–23)
CALCIUM SERPL-MCNC: 10.3 MG/DL (ref 8.6–10.4)
CHLORIDE BLD-SCNC: 101 MMOL/L (ref 98–107)
CHOLESTEROL, FASTING: 134 MG/DL
CHOLESTEROL/HDL RATIO: 3.8
CO2: 27 MMOL/L (ref 20–31)
CREAT SERPL-MCNC: 0.94 MG/DL (ref 0.5–0.9)
CREATININE URINE: 77.3 MG/DL (ref 28–217)
GFR AFRICAN AMERICAN: >60 ML/MIN
GFR NON-AFRICAN AMERICAN: 60 ML/MIN
GFR SERPL CREATININE-BSD FRML MDRD: ABNORMAL ML/MIN/{1.73_M2}
GLUCOSE BLD-MCNC: 117 MG/DL (ref 70–99)
HCT VFR BLD CALC: 41.1 % (ref 36.3–47.1)
HDLC SERPL-MCNC: 35 MG/DL
HEMOGLOBIN: 13.7 G/DL (ref 11.9–15.1)
LDL CHOLESTEROL: 42 MG/DL (ref 0–130)
MCH RBC QN AUTO: 29 PG (ref 25.2–33.5)
MCHC RBC AUTO-ENTMCNC: 33.3 G/DL (ref 28.4–34.8)
MCV RBC AUTO: 87.1 FL (ref 82.6–102.9)
MICROALBUMIN/CREAT 24H UR: <12 MG/L
MICROALBUMIN/CREAT UR-RTO: NORMAL MCG/MG CREAT
NRBC AUTOMATED: 0 PER 100 WBC
PDW BLD-RTO: 12.9 % (ref 11.8–14.4)
PLATELET # BLD: 265 K/UL (ref 138–453)
PMV BLD AUTO: 10.8 FL (ref 8.1–13.5)
POTASSIUM SERPL-SCNC: 3.6 MMOL/L (ref 3.7–5.3)
RBC # BLD: 4.72 M/UL (ref 3.95–5.11)
SODIUM BLD-SCNC: 141 MMOL/L (ref 135–144)
THYROXINE, FREE: 1.71 NG/DL (ref 0.93–1.7)
TOTAL PROTEIN: 7 G/DL (ref 6.4–8.3)
TRIGLYCERIDE, FASTING: 287 MG/DL
TSH SERPL DL<=0.05 MIU/L-ACNC: 0.49 UIU/ML (ref 0.3–5)
URIC ACID: 8 MG/DL (ref 2.4–5.7)
WBC # BLD: 5.7 K/UL (ref 3.5–11.3)

## 2022-05-18 PROCEDURE — 2022F DILAT RTA XM EVC RTNOPTHY: CPT | Performed by: PEDIATRICS

## 2022-05-18 PROCEDURE — G8427 DOCREV CUR MEDS BY ELIG CLIN: HCPCS | Performed by: PEDIATRICS

## 2022-05-18 PROCEDURE — 1123F ACP DISCUSS/DSCN MKR DOCD: CPT | Performed by: PEDIATRICS

## 2022-05-18 PROCEDURE — 1036F TOBACCO NON-USER: CPT | Performed by: PEDIATRICS

## 2022-05-18 PROCEDURE — G8399 PT W/DXA RESULTS DOCUMENT: HCPCS | Performed by: PEDIATRICS

## 2022-05-18 PROCEDURE — 1090F PRES/ABSN URINE INCON ASSESS: CPT | Performed by: PEDIATRICS

## 2022-05-18 PROCEDURE — 3017F COLORECTAL CA SCREEN DOC REV: CPT | Performed by: PEDIATRICS

## 2022-05-18 PROCEDURE — 3044F HG A1C LEVEL LT 7.0%: CPT | Performed by: PEDIATRICS

## 2022-05-18 PROCEDURE — 99214 OFFICE O/P EST MOD 30 MIN: CPT | Performed by: PEDIATRICS

## 2022-05-18 PROCEDURE — G8417 CALC BMI ABV UP PARAM F/U: HCPCS | Performed by: PEDIATRICS

## 2022-05-18 ASSESSMENT — ENCOUNTER SYMPTOMS
NAUSEA: 1
RHINORRHEA: 0
EYE REDNESS: 0
EYE PAIN: 0
ABDOMINAL PAIN: 1
COUGH: 1
CHEST TIGHTNESS: 0
SORE THROAT: 0
SHORTNESS OF BREATH: 0
ABDOMINAL DISTENTION: 1
EYE DISCHARGE: 0
VOMITING: 0
WHEEZING: 0
COLOR CHANGE: 0
CONSTIPATION: 0
DIARRHEA: 0
VOICE CHANGE: 0

## 2022-05-18 NOTE — PROGRESS NOTES
Osvaldo Perry (:  1955) is a 77 y.o. female,Established patient, here for evaluation of the following chief complaint(s):  Diabetes, Hypertension, and Cholesterol Problem         ASSESSMENT/PLAN:    1. Type 2 diabetes mellitus with diabetic polyneuropathy, without long-term current use of insulin ([facility]  2. Diabetic polyneuropathy associated with type 2 diabetes mellitus ([facility]  3. Essential hypertension  4. Hyperlipidemia, unspecified hyperlipidemia type  5. Hypothyroidism, unspecified type  6. Gastroesophageal reflux disease, unspecified whether esophagitis present  7. Hiatal hernia  8. Polyp of colon, unspecified part of colon, unspecified type  9. Chronic gout of multiple sites, unspecified cause  10. Anxiety  11. Encounter for screening mammogram for malignant neoplasm of breast  -     KANDY ASMITA DIGITAL SCREEN BILATERAL; Future      Obtain labs as ordered  Strict diabetic diet and regular exercise encouraged  Weight reduction encouraged  Consider Ozempic if hemoglobin A1c is not improved  Yearly eye and foot exams recommended  Monitor blood pressure regularly  Follow-up with cardiology as scheduled  Continue Lipitor  Follow-up with GI as needed  Colonoscopy due in   Start allopurinol  Continue colchicine for now and discontinue after starting allopurinol  Ativan only as needed for severe anxiety  Shingles vaccine recommended  COVID-vaccine recommended  Obtain Mammogram   Dexascan in 2022  Call with concerns      Return in about 3 months (around 2022) for routine follow up. Subjective     HPI    Patient presents today for routine follow-up of her chronic medical problems including type 2 diabetes with neuropathy, hypertension, hyperlipidemia, hypothyroidism, GERD, hiatal hernia, colon polyps and gout. She also has a history of anxiety. She is checking her blood sugars several times per week and they are doing well. She does continue on metformin.   She is due for hemoglobin A1c.  She is due for her eye exam and was encouraged to schedule this. She does have numbness and tingling in her toes likely secondary to mild diabetic neuropathy. Her blood pressure is well controlled with her current medications. She did see Dr. Yue Phelps recently and he did put her on a monitor for 2 days. He told her that he saw nothing that was life-threatening and she will follow-up in 6 months again. She does have a history of hyperlipidemia that is treated with Lipitor. She is tolerating this without side effects. She does have a history of hypothyroidism and has been taking her thyroid regularly. She does have a history of GERD and continues on pantoprazole. She did have an EGD in 2021 with Dr. Isi Nuno that showed a hiatal hernia. He advised her to continue on pantoprazole and watch what she is eating. This has been doing really well for the last 3 weeks. She did have a colonoscopy on 4/9/2021 that showed 3 polyps. One was a hyperplastic polyp and 2 were tubular adenomas. Her next colonoscopy will be due in 2026. She does have a history of chronic gout treated with Colcrys. She was supposed to start on allopurinol but she has not started this. She did have a little flare of gout in the left ankle about a month ago and this is much better now. He is going to see podiatry for further evaluation. She does have a history of anxiety and is using Ativan very infrequently for her history of this. She does see dermatology regularly. She has no other concerns at this time. cmw      Review of Systems   Constitutional: Positive for fatigue. Negative for appetite change, chills, fever and unexpected weight change. HENT: Negative for congestion, ear pain, postnasal drip, rhinorrhea, sore throat and voice change. Eyes: Negative for pain, discharge, redness and visual disturbance. Respiratory: Positive for cough (occasional). Negative for chest tightness, shortness of breath and wheezing. Cardiovascular: Positive for palpitations (occasional). Negative for chest pain and leg swelling. Gastrointestinal: Positive for abdominal distention, abdominal pain and nausea. Negative for constipation, diarrhea and vomiting. GERD stable with protonix. Endocrine: Negative for polydipsia, polyphagia and polyuria. Genitourinary: Negative for decreased urine volume, dysuria, frequency, hematuria and urgency. Musculoskeletal: Positive for arthralgias. Negative for myalgias and neck pain. Skin: Negative for color change and rash. Allergic/Immunologic: Negative for immunocompromised state. Neurological: Positive for numbness. Negative for dizziness, weakness, light-headedness and headaches. Hematological: Negative for adenopathy. Does not bruise/bleed easily. Psychiatric/Behavioral: Negative for dysphoric mood and sleep disturbance. The patient is nervous/anxious. Objective      Physical Exam  Vitals and nursing note reviewed. Constitutional:       General: She is not in acute distress. Appearance: Normal appearance. She is well-developed. She is not ill-appearing, toxic-appearing or diaphoretic. Comments: obese   HENT:      Head: Normocephalic. Right Ear: Tympanic membrane, ear canal and external ear normal. There is no impacted cerumen. Left Ear: Tympanic membrane, ear canal and external ear normal. There is no impacted cerumen. Nose: Nose normal. No congestion or rhinorrhea. Mouth/Throat:      Mouth: Mucous membranes are moist.      Pharynx: No oropharyngeal exudate. Eyes:      General: No scleral icterus. Right eye: No discharge. Left eye: No discharge. Conjunctiva/sclera: Conjunctivae normal.      Pupils: Pupils are equal, round, and reactive to light. Neck:      Thyroid: No thyromegaly. Vascular: No JVD. Cardiovascular:      Rate and Rhythm: Normal rate and regular rhythm. Pulses: Normal pulses.       Heart sounds: Normal heart sounds. No murmur heard. Pulmonary:      Effort: Pulmonary effort is normal. No respiratory distress. Breath sounds: Normal breath sounds. No stridor. No decreased breath sounds, wheezing, rhonchi or rales. Chest:      Chest wall: No tenderness. Abdominal:      General: Bowel sounds are normal.      Palpations: Abdomen is soft. There is no mass. Tenderness: There is no abdominal tenderness. There is no right CVA tenderness or left CVA tenderness. Musculoskeletal:         General: No tenderness. Cervical back: Normal range of motion and neck supple. Right lower leg: No edema. Left lower leg: No edema. Lymphadenopathy:      Cervical: No cervical adenopathy. Skin:     General: Skin is warm. Capillary Refill: Capillary refill takes less than 2 seconds. Coloration: Skin is not pale. Findings: No erythema or rash. Neurological:      General: No focal deficit present. Mental Status: She is alert and oriented to person, place, and time. Cranial Nerves: No cranial nerve deficit. Motor: No weakness or abnormal muscle tone. Coordination: Coordination normal.      Gait: Gait normal.      Deep Tendon Reflexes: Reflexes are normal and symmetric. Psychiatric:         Mood and Affect: Mood normal.         Behavior: Behavior normal.         Thought Content: Thought content normal.         Judgment: Judgment normal.            An electronic signature was used to authenticate this note.     --Antelmo Jamison MD

## 2022-05-19 LAB
ESTIMATED AVERAGE GLUCOSE: 146 MG/DL
HBA1C MFR BLD: 6.7 % (ref 4–6)

## 2022-05-29 VITALS
BODY MASS INDEX: 30.3 KG/M2 | HEART RATE: 56 BPM | WEIGHT: 171 LBS | DIASTOLIC BLOOD PRESSURE: 82 MMHG | TEMPERATURE: 97.3 F | HEIGHT: 63 IN | SYSTOLIC BLOOD PRESSURE: 124 MMHG

## 2022-06-06 DIAGNOSIS — E03.9 HYPOTHYROIDISM, UNSPECIFIED TYPE: ICD-10-CM

## 2022-06-06 DIAGNOSIS — E11.42 TYPE 2 DIABETES MELLITUS WITH DIABETIC POLYNEUROPATHY, WITHOUT LONG-TERM CURRENT USE OF INSULIN (HCC): ICD-10-CM

## 2022-06-06 DIAGNOSIS — M1A.09X0 CHRONIC GOUT OF MULTIPLE SITES, UNSPECIFIED CAUSE: ICD-10-CM

## 2022-06-06 DIAGNOSIS — E78.5 HYPERLIPIDEMIA, UNSPECIFIED HYPERLIPIDEMIA TYPE: Primary | ICD-10-CM

## 2022-07-11 NOTE — TELEPHONE ENCOUNTER
LOV 5-18-22  LRF 12-31-21    Health Maintenance   Topic Date Due    Shingles vaccine (1 of 2) Never done    Diabetic retinal exam  02/06/2020    Depression Screen  05/26/2022    Annual Wellness Visit (AWV)  05/27/2022    Flu vaccine (1) 09/01/2022    Diabetic foot exam  09/29/2022    Breast cancer screen  05/05/2023    A1C test (Diabetic or Prediabetic)  05/18/2023    Diabetic microalbuminuria test  05/18/2023    Lipids  05/18/2023    Pneumococcal 65+ years Vaccine (3 - PPSV23 or PCV20) 11/11/2024    Colorectal Cancer Screen  04/09/2031    DEXA (modify frequency per FRAX score)  Completed    COVID-19 Vaccine  Completed    Hepatitis C screen  Completed    Hepatitis A vaccine  Aged Out    Hib vaccine  Aged Out    Meningococcal (ACWY) vaccine  Aged Out             (applicable per patient's age: Cancer Screenings, Depression Screening, Fall Risk Screening, Immunizations)    Hemoglobin A1C (%)   Date Value   05/18/2022 6.7 (H)   12/15/2021 7.1 (H)   05/05/2021 6.8 (H)     Microalb/Crt.  Ratio (mcg/mg creat)   Date Value   05/18/2022 Can not be calculated     LDL Cholesterol (mg/dL)   Date Value   05/18/2022 42     LDL Calculated (mg/dL)   Date Value   04/13/2015 32     AST (U/L)   Date Value   05/18/2022 49 (H)     ALT (U/L)   Date Value   05/18/2022 44 (H)     BUN (mg/dL)   Date Value   05/18/2022 15      (goal A1C is < 7)   (goal LDL is <100) need 30-50% reduction from baseline     BP Readings from Last 3 Encounters:   05/18/22 124/82   01/05/22 112/76   09/29/21 110/68    (goal /80)      All Future Testing planned in [facility]:  Lab Frequency Next Occurrence   KANDY ASMITA DIGITAL SCREEN BILATERAL Once 05/18/2022   Comprehensive Metabolic Panel Once 49/24/4943   Hemoglobin A1C Once 09/01/2022   TSH Once 09/01/2022   T4, Free Once 09/01/2022   Uric Acid Once 09/01/2022       Next Visit Date:  Future Appointments   Date Time Provider Kati White   10/14/2022  2:00 PM Sandra Morales MD [facility]   10/14/2022  2:30 PM Carito Luu MD [facility] [address] [facility]            Patient Active Problem List:     Hypertension     Type 2 diabetes mellitus with diabetic polyneuropathy, without long-term current use of insulin (HCC)     Hypothyroidism     Hyperlipidemia     Gastroesophageal reflux disease     Diabetic polyneuropathy associated with type 2 diabetes mellitus (HCC)     Chronic gout of multiple sites     Hiatal hernia     Polyp of colon     Chronic cough     Anxiety

## 2022-07-12 RX ORDER — LEVOTHYROXINE SODIUM 137 UG/1
137 TABLET ORAL DAILY
Qty: 90 TABLET | Refills: 1 | Status: SHIPPED | OUTPATIENT
Start: 2022-07-12 | End: 2023-07-12

## 2022-07-21 DIAGNOSIS — N90.89 VULVAR IRRITATION: ICD-10-CM

## 2022-07-21 DIAGNOSIS — E11.42 TYPE 2 DIABETES MELLITUS WITH DIABETIC POLYNEUROPATHY, WITHOUT LONG-TERM CURRENT USE OF INSULIN (HCC): ICD-10-CM

## 2022-07-21 DIAGNOSIS — M1A.09X0 CHRONIC GOUT OF MULTIPLE SITES, UNSPECIFIED CAUSE: ICD-10-CM

## 2022-07-21 DIAGNOSIS — N95.2 ATROPHIC VAGINITIS: ICD-10-CM

## 2022-07-21 DIAGNOSIS — K21.9 GASTROESOPHAGEAL REFLUX DISEASE: ICD-10-CM

## 2022-07-21 RX ORDER — CLOBETASOL PROPIONATE 0.5 MG/G
OINTMENT TOPICAL
Qty: 60 G | Refills: 1 | Status: SHIPPED | OUTPATIENT
Start: 2022-07-21 | End: 2023-07-21

## 2022-07-21 RX ORDER — COLCHICINE 0.6 MG/1
0.6 TABLET ORAL DAILY
Qty: 30 TABLET | Refills: 5 | Status: SHIPPED | OUTPATIENT
Start: 2022-07-21 | End: 2023-07-21

## 2022-07-21 RX ORDER — PANTOPRAZOLE SODIUM 40 MG/1
TABLET, DELAYED RELEASE ORAL
Qty: 90 TABLET | Refills: 1 | Status: CANCELLED | OUTPATIENT
Start: 2022-07-21

## 2022-07-21 RX ORDER — LEVOTHYROXINE SODIUM 137 UG/1
137 TABLET ORAL DAILY
Qty: 90 TABLET | Refills: 1 | Status: CANCELLED | OUTPATIENT
Start: 2022-07-21 | End: 2023-07-21

## 2022-07-21 RX ORDER — METFORMIN HYDROCHLORIDE 500 MG/1
TABLET, EXTENDED RELEASE ORAL
Qty: 270 TABLET | Refills: 1 | Status: CANCELLED | OUTPATIENT
Start: 2022-07-21

## 2022-07-21 NOTE — TELEPHONE ENCOUNTER
LOV 5-18-22  LRF Clobetasol 2-4-21, Colcrys 12-21-21    Health Maintenance   Topic Date Due    Shingles vaccine (1 of 2) Never done    Diabetic retinal exam  02/06/2020    Depression Screen  05/26/2022    Annual Wellness Visit (AWV)  05/27/2022    Flu vaccine (1) 09/01/2022    Diabetic foot exam  09/29/2022    Breast cancer screen  05/05/2023    A1C test (Diabetic or Prediabetic)  05/18/2023    Diabetic microalbuminuria test  05/18/2023    Lipids  05/18/2023    Pneumococcal 65+ years Vaccine (3 - PPSV23 or PCV20) 11/11/2024    Colorectal Cancer Screen  04/09/2031    DEXA (modify frequency per FRAX score)  Completed    COVID-19 Vaccine  Completed    Hepatitis C screen  Completed    Hepatitis A vaccine  Aged Out    Hib vaccine  Aged Out    Meningococcal (ACWY) vaccine  Aged Out             (applicable per patient's age: Cancer Screenings, Depression Screening, Fall Risk Screening, Immunizations)    Hemoglobin A1C (%)   Date Value   05/18/2022 6.7 (H)   12/15/2021 7.1 (H)   05/05/2021 6.8 (H)     Microalb/Crt.  Ratio (mcg/mg creat)   Date Value   05/18/2022 Can not be calculated     LDL Cholesterol (mg/dL)   Date Value   05/18/2022 42     LDL Calculated (mg/dL)   Date Value   04/13/2015 32     AST (U/L)   Date Value   05/18/2022 49 (H)     ALT (U/L)   Date Value   05/18/2022 44 (H)     BUN (mg/dL)   Date Value   05/18/2022 15      (goal A1C is < 7)   (goal LDL is <100) need 30-50% reduction from baseline     BP Readings from Last 3 Encounters:   05/18/22 124/82   01/05/22 112/76   09/29/21 110/68    (goal /80)      All Future Testing planned in [facility]:  Lab Frequency Next Occurrence   KANDY ASMITA DIGITAL SCREEN BILATERAL Once 05/18/2022   Comprehensive Metabolic Panel Once 74/53/9701   Hemoglobin A1C Once 09/01/2022   TSH Once 09/01/2022   T4, Free Once 09/01/2022   Uric Acid Once 09/01/2022       Next Visit Date:  Future Appointments   Date Time Provider Kati White   10/14/2022  2:00 PM Petros Barrera MD [facility]   10/14/2022  2:30 PM MD Amee West [facility]            Patient Active Problem List:     Hypertension     Type 2 diabetes mellitus with diabetic polyneuropathy, without long-term current use of insulin ([facility]     Hypothyroidism     Hyperlipidemia     Gastroesophageal reflux disease     Diabetic polyneuropathy associated with type 2 diabetes mellitus (HCC)     Chronic gout of multiple sites     Hiatal hernia     Polyp of colon     Chronic cough     Anxiety

## 2022-10-03 ENCOUNTER — HOSPITAL ENCOUNTER (OUTPATIENT)
Age: 67
Setting detail: SPECIMEN
Discharge: HOME | End: 2022-10-03

## 2022-10-03 ENCOUNTER — OFFICE VISIT (OUTPATIENT)
Dept: FAMILY MEDICINE CLINIC | Age: 67
End: 2022-10-03
Payer: MEDICARE

## 2022-10-03 VITALS
DIASTOLIC BLOOD PRESSURE: 76 MMHG | BODY MASS INDEX: 30.72 KG/M2 | RESPIRATION RATE: 14 BRPM | HEART RATE: 58 BPM | SYSTOLIC BLOOD PRESSURE: 114 MMHG | TEMPERATURE: 98 F | WEIGHT: 173.4 LBS

## 2022-10-03 DIAGNOSIS — N95.0 POST-MENOPAUSAL BLEEDING: ICD-10-CM

## 2022-10-03 DIAGNOSIS — R14.0 ABDOMINAL BLOATING: ICD-10-CM

## 2022-10-03 DIAGNOSIS — E11.42 TYPE 2 DIABETES MELLITUS WITH DIABETIC POLYNEUROPATHY, WITHOUT LONG-TERM CURRENT USE OF INSULIN (HCC): ICD-10-CM

## 2022-10-03 DIAGNOSIS — Z23 NEED FOR INFLUENZA VACCINATION: ICD-10-CM

## 2022-10-03 DIAGNOSIS — R10.9 RIGHT FLANK PAIN: ICD-10-CM

## 2022-10-03 DIAGNOSIS — E78.5 HYPERLIPIDEMIA, UNSPECIFIED HYPERLIPIDEMIA TYPE: ICD-10-CM

## 2022-10-03 DIAGNOSIS — E03.9 HYPOTHYROIDISM, UNSPECIFIED TYPE: ICD-10-CM

## 2022-10-03 DIAGNOSIS — M25.552 LEFT HIP PAIN: ICD-10-CM

## 2022-10-03 DIAGNOSIS — R10.11 RIGHT UPPER QUADRANT PAIN: Primary | ICD-10-CM

## 2022-10-03 DIAGNOSIS — M1A.09X0 CHRONIC GOUT OF MULTIPLE SITES, UNSPECIFIED CAUSE: ICD-10-CM

## 2022-10-03 DIAGNOSIS — M25.551 RIGHT HIP PAIN: ICD-10-CM

## 2022-10-03 LAB
ALBUMIN SERPL-MCNC: 4.4 G/DL (ref 3.5–5.2)
ALBUMIN/GLOBULIN RATIO: 1.7 (ref 1–2.5)
ALP BLD-CCNC: 74 U/L (ref 35–104)
ALT SERPL-CCNC: 39 U/L (ref 5–33)
ANION GAP SERPL CALCULATED.3IONS-SCNC: 13 MMOL/L (ref 9–17)
AST SERPL-CCNC: 33 U/L
BILIRUB SERPL-MCNC: 0.5 MG/DL (ref 0.3–1.2)
BUN BLDV-MCNC: 18 MG/DL (ref 8–23)
CALCIUM SERPL-MCNC: 9.8 MG/DL (ref 8.6–10.4)
CHLORIDE BLD-SCNC: 101 MMOL/L (ref 98–107)
CO2: 27 MMOL/L (ref 20–31)
CREAT SERPL-MCNC: 1.09 MG/DL (ref 0.5–0.9)
GFR AFRICAN AMERICAN: >60 ML/MIN
GFR NON-AFRICAN AMERICAN: 50 ML/MIN
GFR SERPL CREATININE-BSD FRML MDRD: ABNORMAL ML/MIN/{1.73_M2}
GLUCOSE BLD-MCNC: 132 MG/DL (ref 70–99)
POTASSIUM SERPL-SCNC: 3.8 MMOL/L (ref 3.7–5.3)
SODIUM BLD-SCNC: 141 MMOL/L (ref 135–144)
THYROXINE, FREE: 1.65 NG/DL (ref 0.93–1.7)
TOTAL PROTEIN: 7 G/DL (ref 6.4–8.3)
TSH SERPL DL<=0.05 MIU/L-ACNC: 1.5 UIU/ML (ref 0.3–5)
URIC ACID: 7.5 MG/DL (ref 2.4–5.7)

## 2022-10-03 PROCEDURE — 1036F TOBACCO NON-USER: CPT | Performed by: PEDIATRICS

## 2022-10-03 PROCEDURE — G8399 PT W/DXA RESULTS DOCUMENT: HCPCS | Performed by: PEDIATRICS

## 2022-10-03 PROCEDURE — 90694 VACC AIIV4 NO PRSRV 0.5ML IM: CPT | Performed by: PEDIATRICS

## 2022-10-03 PROCEDURE — G0008 ADMIN INFLUENZA VIRUS VAC: HCPCS | Performed by: PEDIATRICS

## 2022-10-03 PROCEDURE — 1123F ACP DISCUSS/DSCN MKR DOCD: CPT | Performed by: PEDIATRICS

## 2022-10-03 PROCEDURE — G8484 FLU IMMUNIZE NO ADMIN: HCPCS | Performed by: PEDIATRICS

## 2022-10-03 PROCEDURE — G8417 CALC BMI ABV UP PARAM F/U: HCPCS | Performed by: PEDIATRICS

## 2022-10-03 PROCEDURE — 99214 OFFICE O/P EST MOD 30 MIN: CPT | Performed by: PEDIATRICS

## 2022-10-03 PROCEDURE — 3017F COLORECTAL CA SCREEN DOC REV: CPT | Performed by: PEDIATRICS

## 2022-10-03 PROCEDURE — G8427 DOCREV CUR MEDS BY ELIG CLIN: HCPCS | Performed by: PEDIATRICS

## 2022-10-03 PROCEDURE — 1090F PRES/ABSN URINE INCON ASSESS: CPT | Performed by: PEDIATRICS

## 2022-10-03 SDOH — ECONOMIC STABILITY: FOOD INSECURITY: WITHIN THE PAST 12 MONTHS, YOU WORRIED THAT YOUR FOOD WOULD RUN OUT BEFORE YOU GOT MONEY TO BUY MORE.: NEVER TRUE

## 2022-10-03 SDOH — ECONOMIC STABILITY: FOOD INSECURITY: WITHIN THE PAST 12 MONTHS, THE FOOD YOU BOUGHT JUST DIDN'T LAST AND YOU DIDN'T HAVE MONEY TO GET MORE.: NEVER TRUE

## 2022-10-03 ASSESSMENT — ENCOUNTER SYMPTOMS
NAUSEA: 0
WHEEZING: 0
ABDOMINAL PAIN: 1
BLOOD IN STOOL: 0
DIARRHEA: 0
ABDOMINAL DISTENTION: 1
VOMITING: 0
CONSTIPATION: 1
SHORTNESS OF BREATH: 0
COLOR CHANGE: 0
STRIDOR: 0

## 2022-10-03 ASSESSMENT — PATIENT HEALTH QUESTIONNAIRE - PHQ9
SUM OF ALL RESPONSES TO PHQ QUESTIONS 1-9: 0
2. FEELING DOWN, DEPRESSED OR HOPELESS: 0
SUM OF ALL RESPONSES TO PHQ QUESTIONS 1-9: 0
SUM OF ALL RESPONSES TO PHQ QUESTIONS 1-9: 0
SUM OF ALL RESPONSES TO PHQ9 QUESTIONS 1 & 2: 0
1. LITTLE INTEREST OR PLEASURE IN DOING THINGS: 0
SUM OF ALL RESPONSES TO PHQ QUESTIONS 1-9: 0

## 2022-10-03 ASSESSMENT — SOCIAL DETERMINANTS OF HEALTH (SDOH): HOW HARD IS IT FOR YOU TO PAY FOR THE VERY BASICS LIKE FOOD, HOUSING, MEDICAL CARE, AND HEATING?: NOT HARD AT ALL

## 2022-10-03 NOTE — PROGRESS NOTES
Yvette De Jesus (:  1955) is a 77 y.o. female,Established patient, here for evaluation of the following chief complaint(s):  Pain         ASSESSMENT/PLAN:    1. Right upper quadrant pain  -     CT ABDOMEN PELVIS WO CONTRAST Additional Contrast? Radiologist Recommendation; Future  2. Right flank pain  3. Abdominal bloating  -     Lesia Saavedra MD, Gynecology, [address] [place] [address]; Future  -     CT ABDOMEN PELVIS WO CONTRAST Additional Contrast? Radiologist Recommendation; Future  4. Post-menopausal bleeding  -     Lashell Carbone MD, Gynecology, [address] [place] [address]; Future  -     CT ABDOMEN PELVIS WO CONTRAST Additional Contrast? Radiologist Recommendation; Future  5. Right hip pain  -     XR HIP RIGHT (2-3 VIEWS); Future  6. Left hip pain  -     XR HIP LEFT (2-3 VIEWS); Future  7. Need for influenza vaccination  -     Influenza, FLUAD, (age 72 y+), IM, Preservative Free, 0.5 mL      Obtain CT scan of the abdomen without contrast  Obtain transvaginal pelvic ultrasound  Consult with GYN for postmenopausal bleeding  Obtain right hip x-ray  Obtain left hip x-ray  Flu vaccine today  Call with concerns      Return if symptoms worsen or fail to improve. Subjective     HPI    Patient presents today for evaluation of worsening right upper quadrant pain. She states that she has had some intermittent right upper quadrant pain for about the last year. This has significantly worsened over the last several months. She states that this pain is there all the time and it does seem to be worse at the end of the day. She does feel quite bloated. She states that it feels like there is something stuck and the pain radiates around to her back. She states that rolling over in bed does cause a significant amount of pain. Standing does make it a little bit better. She denies any nausea or vomiting associated with her symptoms.   She denies any acute changes in her stool. She denies any urinary symptoms. When I did ask her if she had any urinary symptoms she tells me that she had an episode of postmenopausal bleeding about 2 weeks ago. She went to the restroom and noticed some blood on the toilet paper after she wiped and then noticed some blood in her underwear. This has not happened since then. She complains of bilateral hip pain that has been present for some time which seems to be gradually worsening. She does wonder about osteoarthritis. cmw      Review of Systems   Constitutional:  Negative for appetite change, chills, fatigue, fever and unexpected weight change. Respiratory:  Negative for shortness of breath, wheezing and stridor. Cardiovascular:  Negative for chest pain, palpitations and leg swelling. Gastrointestinal:  Positive for abdominal distention, abdominal pain and constipation. Negative for blood in stool, diarrhea, nausea and vomiting. Endocrine: Negative for polydipsia, polyphagia and polyuria. Genitourinary:  Positive for flank pain and menstrual problem. Negative for decreased urine volume, difficulty urinating, dyspareunia, dysuria, hematuria, pelvic pain and urgency. Musculoskeletal:  Positive for arthralgias. Skin:  Negative for color change and rash. Objective     Physical Exam  Vitals and nursing note reviewed. Constitutional:       General: She is not in acute distress. Appearance: Normal appearance. She is obese. She is not ill-appearing, toxic-appearing or diaphoretic. HENT:      Head: Normocephalic. Right Ear: Tympanic membrane, ear canal and external ear normal. There is no impacted cerumen. Left Ear: Tympanic membrane, ear canal and external ear normal. There is no impacted cerumen. Nose: Nose normal. No congestion. Cardiovascular:      Rate and Rhythm: Normal rate and regular rhythm. Pulses: Normal pulses. Heart sounds: Normal heart sounds.  No murmur heard.  Pulmonary:      Effort: Pulmonary effort is normal. No respiratory distress. Breath sounds: Normal breath sounds. No stridor. No wheezing or rhonchi. Abdominal:      General: Abdomen is flat. Bowel sounds are normal.      Tenderness: There is abdominal tenderness in the right upper quadrant. There is no right CVA tenderness, left CVA tenderness, guarding or rebound. Negative signs include Dolan's sign and McBurney's sign. Musculoskeletal:      Cervical back: Normal range of motion. No rigidity. Right hip: Tenderness present. No deformity, bony tenderness or crepitus. Decreased range of motion. Normal strength. Left hip: Tenderness present. No deformity, bony tenderness or crepitus. Decreased range of motion. Normal strength. Right lower leg: No edema. Left lower leg: No edema. Skin:     General: Skin is warm. Capillary Refill: Capillary refill takes less than 2 seconds. Findings: No rash. Neurological:      Mental Status: She is alert and oriented to person, place, and time. Psychiatric:         Mood and Affect: Mood normal.         Behavior: Behavior normal.                An electronic signature was used to authenticate this note.     --Jase Khanna MD

## 2022-10-05 DIAGNOSIS — E11.42 TYPE 2 DIABETES MELLITUS WITH DIABETIC POLYNEUROPATHY, WITHOUT LONG-TERM CURRENT USE OF INSULIN (HCC): ICD-10-CM

## 2022-10-05 LAB
ESTIMATED AVERAGE GLUCOSE: 148 MG/DL
HBA1C MFR BLD: 6.8 % (ref 4–6)

## 2022-10-05 RX ORDER — METFORMIN HYDROCHLORIDE 500 MG/1
TABLET, EXTENDED RELEASE ORAL
Qty: 270 TABLET | Refills: 1 | Status: SHIPPED | OUTPATIENT
Start: 2022-10-05

## 2022-10-05 RX ORDER — ATORVASTATIN CALCIUM 20 MG/1
TABLET, FILM COATED ORAL
Qty: 90 TABLET | Refills: 1 | Status: SHIPPED | OUTPATIENT
Start: 2022-10-05

## 2022-10-05 RX ORDER — CARVEDILOL 25 MG/1
TABLET ORAL
Qty: 180 TABLET | Refills: 1 | Status: SHIPPED | OUTPATIENT
Start: 2022-10-05

## 2022-10-05 NOTE — TELEPHONE ENCOUNTER
Last visit: 5/18/22  Last Med refill: 4/11/22 metformin,lipitor, 4/20/22 carvedilol  Does patient have enough medication for 72 hours: Yes    Next Visit Date:  Future Appointments   Date Time Provider Kati Scotti   11/2/2022 10:15 AM STA DIAG MAMMO RM 3 [facility] Radiolog   12/21/2022 12:30 PM MD [facility]   12/21/2022  1:00 PM MD [facility] [address] Maintenance   Topic Date Due    Shingles vaccine (1 of 2) Never done    Diabetic retinal exam  02/06/2020    Annual Wellness Visit (AWV)  05/27/2022    COVID-19 Vaccine (3 - Booster for Magdi series) 06/15/2022    Diabetic foot exam  09/29/2022    Breast cancer screen  05/05/2023    A1C test (Diabetic or Prediabetic)  05/18/2023    Diabetic microalbuminuria test  05/18/2023    Lipids  05/18/2023    Depression Screen  10/03/2023    Pneumococcal 65+ years Vaccine (3 - PPSV23 or PCV20) 11/11/2024    Colorectal Cancer Screen  04/09/2031    DEXA (modify frequency per FRAX score)  Completed    Flu vaccine  Completed    Hepatitis C screen  Completed    Hepatitis A vaccine  Aged Out    Hib vaccine  Aged Out    Meningococcal (ACWY) vaccine  Aged Out       Hemoglobin A1C (%)   Date Value   05/18/2022 6.7 (H)   12/15/2021 7.1 (H)   05/05/2021 6.8 (H)             ( goal A1C is < 7)   Microalb/Crt.  Ratio (mcg/mg creat)   Date Value   05/18/2022 Can not be calculated     LDL Cholesterol (mg/dL)   Date Value   05/18/2022 42   12/15/2021 46     LDL Calculated (mg/dL)   Date Value   04/13/2015 32   07/22/2014 57       (goal LDL is <100)   AST (U/L)   Date Value   10/03/2022 33 (H)     ALT (U/L)   Date Value   10/03/2022 39 (H)     BUN (mg/dL)   Date Value   10/03/2022 18     BP Readings from Last 3 Encounters:   10/03/22 114/76   05/18/22 124/82   01/05/22 112/76          (goal 120/80)    All Future Testing planned in [facility]  Lab Frequency Next Occurrence   KANDY ASMITA DIGITAL SCREEN BILATERAL Once 05/18/2022   US NON OB TRANSVAGINAL Once 10/03/2022   CT ABDOMEN PELVIS WO CONTRAST Additional Contrast? Radiologist Recommendation Once 10/03/2022   XR HIP RIGHT (2-3 VIEWS) Once 10/03/2022   XR HIP LEFT (2-3 VIEWS) Once 10/03/2022               Patient Active Problem List:     Hypertension     Type 2 diabetes mellitus with diabetic polyneuropathy, without long-term current use of insulin (HCC)     Hypothyroidism     Hyperlipidemia     Gastroesophageal reflux disease     Diabetic polyneuropathy associated with type 2 diabetes mellitus (HCC)     Chronic gout of multiple sites     Hiatal hernia     Polyp of colon     Chronic cough     Anxiety

## 2022-10-06 DIAGNOSIS — M25.552 LEFT HIP PAIN: ICD-10-CM

## 2022-10-06 DIAGNOSIS — E78.5 HYPERLIPIDEMIA, UNSPECIFIED HYPERLIPIDEMIA TYPE: ICD-10-CM

## 2022-10-06 DIAGNOSIS — M1A.09X0 CHRONIC GOUT OF MULTIPLE SITES, UNSPECIFIED CAUSE: ICD-10-CM

## 2022-10-06 DIAGNOSIS — M25.551 RIGHT HIP PAIN: ICD-10-CM

## 2022-10-06 DIAGNOSIS — E03.9 HYPOTHYROIDISM, UNSPECIFIED TYPE: ICD-10-CM

## 2022-10-06 DIAGNOSIS — E11.42 TYPE 2 DIABETES MELLITUS WITH DIABETIC POLYNEUROPATHY, WITHOUT LONG-TERM CURRENT USE OF INSULIN (HCC): Primary | ICD-10-CM

## 2022-10-18 ENCOUNTER — HOSPITAL ENCOUNTER (OUTPATIENT)
Dept: CT IMAGING | Facility: CLINIC | Age: 67
Discharge: HOME | End: 2022-10-20
Payer: MEDICARE

## 2022-10-18 ENCOUNTER — HOSPITAL ENCOUNTER (OUTPATIENT)
Dept: ULTRASOUND IMAGING | Facility: CLINIC | Age: 67
Discharge: HOME | End: 2022-10-20
Payer: MEDICARE

## 2022-10-18 DIAGNOSIS — R10.11 RIGHT UPPER QUADRANT PAIN: ICD-10-CM

## 2022-10-18 DIAGNOSIS — N95.0 POST-MENOPAUSAL BLEEDING: ICD-10-CM

## 2022-10-18 DIAGNOSIS — R14.0 ABDOMINAL BLOATING: ICD-10-CM

## 2022-10-18 PROCEDURE — 76830 TRANSVAGINAL US NON-OB: CPT

## 2022-10-18 PROCEDURE — 74176 CT ABD & PELVIS W/O CONTRAST: CPT

## 2022-10-18 PROCEDURE — A4641 RADIOPHARM DX AGENT NOC: HCPCS | Performed by: PEDIATRICS

## 2022-10-18 PROCEDURE — 6360000004 HC RX CONTRAST MEDICATION: Performed by: PEDIATRICS

## 2022-10-18 RX ADMIN — BARIUM SULFATE 450 ML: 20 SUSPENSION ORAL at 13:15

## 2022-11-02 ENCOUNTER — HOSPITAL ENCOUNTER (OUTPATIENT)
Dept: MAMMOGRAPHY | Age: 67
Discharge: HOME | End: 2022-11-04
Payer: MEDICARE

## 2022-11-02 ENCOUNTER — OFFICE VISIT (OUTPATIENT)
Dept: OBGYN CLINIC | Age: 67
End: 2022-11-02
Payer: MEDICARE

## 2022-11-02 ENCOUNTER — HOSPITAL ENCOUNTER (OUTPATIENT)
Age: 67
Setting detail: SPECIMEN
Discharge: HOME | End: 2022-11-02

## 2022-11-02 VITALS
SYSTOLIC BLOOD PRESSURE: 112 MMHG | DIASTOLIC BLOOD PRESSURE: 78 MMHG | WEIGHT: 171 LBS | HEIGHT: 63 IN | BODY MASS INDEX: 30.3 KG/M2

## 2022-11-02 DIAGNOSIS — N95.0 POST-MENOPAUSAL BLEEDING: Primary | ICD-10-CM

## 2022-11-02 DIAGNOSIS — Z12.31 ENCOUNTER FOR SCREENING MAMMOGRAM FOR MALIGNANT NEOPLASM OF BREAST: ICD-10-CM

## 2022-11-02 DIAGNOSIS — N89.8 VAGINAL ODOR: ICD-10-CM

## 2022-11-02 PROCEDURE — 1090F PRES/ABSN URINE INCON ASSESS: CPT | Performed by: STUDENT IN AN ORGANIZED HEALTH CARE EDUCATION/TRAINING PROGRAM

## 2022-11-02 PROCEDURE — 99203 OFFICE O/P NEW LOW 30 MIN: CPT | Performed by: STUDENT IN AN ORGANIZED HEALTH CARE EDUCATION/TRAINING PROGRAM

## 2022-11-02 PROCEDURE — 3074F SYST BP LT 130 MM HG: CPT | Performed by: STUDENT IN AN ORGANIZED HEALTH CARE EDUCATION/TRAINING PROGRAM

## 2022-11-02 PROCEDURE — 1123F ACP DISCUSS/DSCN MKR DOCD: CPT | Performed by: STUDENT IN AN ORGANIZED HEALTH CARE EDUCATION/TRAINING PROGRAM

## 2022-11-02 PROCEDURE — G8417 CALC BMI ABV UP PARAM F/U: HCPCS | Performed by: STUDENT IN AN ORGANIZED HEALTH CARE EDUCATION/TRAINING PROGRAM

## 2022-11-02 PROCEDURE — 3078F DIAST BP <80 MM HG: CPT | Performed by: STUDENT IN AN ORGANIZED HEALTH CARE EDUCATION/TRAINING PROGRAM

## 2022-11-02 PROCEDURE — 3017F COLORECTAL CA SCREEN DOC REV: CPT | Performed by: STUDENT IN AN ORGANIZED HEALTH CARE EDUCATION/TRAINING PROGRAM

## 2022-11-02 PROCEDURE — 77063 BREAST TOMOSYNTHESIS BI: CPT

## 2022-11-02 PROCEDURE — 1036F TOBACCO NON-USER: CPT | Performed by: STUDENT IN AN ORGANIZED HEALTH CARE EDUCATION/TRAINING PROGRAM

## 2022-11-02 PROCEDURE — G8399 PT W/DXA RESULTS DOCUMENT: HCPCS | Performed by: STUDENT IN AN ORGANIZED HEALTH CARE EDUCATION/TRAINING PROGRAM

## 2022-11-02 PROCEDURE — G8427 DOCREV CUR MEDS BY ELIG CLIN: HCPCS | Performed by: STUDENT IN AN ORGANIZED HEALTH CARE EDUCATION/TRAINING PROGRAM

## 2022-11-02 PROCEDURE — G8484 FLU IMMUNIZE NO ADMIN: HCPCS | Performed by: STUDENT IN AN ORGANIZED HEALTH CARE EDUCATION/TRAINING PROGRAM

## 2022-11-02 NOTE — PROGRESS NOTES
Latisha Glaser  2022    YOB: 1955    HPI:  Latisha Glaser is a 77 y.o. female [id number]    The patient was seen and examined today. She is here as a referral for post menopausal bleeding. She states she was watching the [address] when she went to the bathroom and noticed blood in the toilet. She said she wiped several times and there was blood on the tissue. She is pretty confident it came from her vagina and not her rectum or urine. Denies any dysuria or hx of hemorrhoids. She does complain of a vaginal odor. She is sexually active with her . They have sex about 3-4 times per week. She went through menopause in her 45s and this is the first time she has ever had abnormal bleeding. Admits she will occasionally have a foul smelling vaginal discharge. States it does not matter how often she showers, she can still smell this odor. Denies any color change to her discharge.        OB History    Para Term  AB Living   4 4 4 0 0 4   SAB IAB Ectopic Molar Multiple Live Births   0 0 0 0 0 4      # Outcome Date GA Lbr Sd/2nd Weight Sex Delivery Anes PTL Lv   4 Term            3 Term            2 Term            1 Term                Past Medical History:   Diagnosis Date    Abnormal weight gain     Allergic rhinitis     Anxiety     Asthma     Depression     GERD (gastroesophageal reflux disease)     Hyperlipidemia     Hypertension     Hypothyroidism     Postmenopause bleeding     Tietze's disease     Type II or unspecified type diabetes mellitus without mention of complication, not stated as uncontrolled        Past Surgical History:   Procedure Laterality Date     SECTION      HEMORRHOID SURGERY      HERNIA REPAIR      WRIST FRACTURE SURGERY         Family History   Problem Relation Age of Onset    Diabetes Father     Diabetes Mother     Cancer Mother 50        Uterine       Social History     Socioeconomic History    Marital status:      Spouse name: Not on file    Number of children: Not on file    Years of education: Not on file    Highest education level: Not on file   Occupational History    Not on file   Tobacco Use    Smoking status: Never    Smokeless tobacco: Never   Substance and Sexual Activity    Alcohol use: Yes    Drug use: No    Sexual activity: Not on file   Other Topics Concern    Not on file   Social History Narrative    Not on file     Social Determinants of Health     Financial Resource Strain: Low Risk     Difficulty of Paying Living Expenses: Not hard at all   Food Insecurity: No Food Insecurity    Worried About Running Out of Food in the Last Year: Never true    Ran Out of Food in the Last Year: Never true   Transportation Needs: Not on file   Physical Activity: Not on file   Stress: Not on file   Social Connections: Not on file   Intimate Partner Violence: Not on file   Housing Stability: Not on file       MEDICATIONS:  Current Outpatient Medications   Medication Sig Dispense Refill    atorvastatin (LIPITOR) 20 MG tablet TAKE ONE TABLET BY MOUTH DAILY 90 tablet 1    carvedilol (COREG) 25 MG tablet TAKE ONE TABLET BY MOUTH TWICE A  tablet 1    metFORMIN (GLUCOPHAGE-XR) 500 MG extended release tablet TAKE ONE TABLET BY MOUTH THREE TIMES A DAY WITH BREAKFAST 270 tablet 1    clobetasol (TEMOVATE) 0.05 % ointment Apply a think layer topically 1 time daily. 60 g 1    colchicine (COLCRYS) 0.6 MG tablet Take 1 tablet by mouth in the morning.  30 tablet 5    levothyroxine (SYNTHROID) 137 MCG tablet Take 1 tablet by mouth Daily 90 tablet 1    pantoprazole (PROTONIX) 40 MG tablet TAKE ONE TABLET BY MOUTH DAILY 90 tablet 1    losartan-hydroCHLOROthiazide (HYZAAR) 100-25 MG per tablet TAKE 1 TABLET BY MOUTH DAILY 30 tablet 5    allopurinol (ZYLOPRIM) 100 MG tablet Take 2 tablets by mouth daily (Patient not taking: No sig reported) 60 tablet 5    atorvastatin (LIPITOR) 40 MG tablet Take 1 tablet by mouth daily 90 tablet 1    glucose blood VI test strips ([employer] BLOOD GLUCOSE TEST) strip Test blood sugar once daily. Dx: Diabetes mellitus type 2 in obese 250.00, 278.00 50 each 11    aspirin 81 MG EC tablet Take 81 mg by mouth daily. docusate sodium (COLACE) 100 MG capsule Take 100 mg by mouth daily. No current facility-administered medications for this visit.        ALLERGIES:  Allergies as of 11/02/2022 - Fully Reviewed 11/02/2022   Allergen Reaction Noted    Sulfa antibiotics Hives 11/08/2011    Tetanus toxoids Hives 11/08/2011       REVIEW OF SYSTEMS:    Constitutional: negative fever, negative chills, negative weight changes   HEENT: negative visual disturbances, negative headaches, negative dizziness   Breast: Negative breast abnormalities, negative breast lumps, negative nipple discharge  Respiratory: negative dyspnea, negative cough, negative SOB  Cardiovascular: negative chest pain,  negative palpitations, negative arrhythmia   Gastrointestinal: negative abdominal pain, negative N/V, negative bowel changes, negative heartburn   Genitourinary: negative dysuria, negative hematuria, negative urinary incontinence, negative vaginal discharge, pos vaginal odor, +PMB  Dermatological: negative rash, negative pruritis, negative mole changes  Hematologic: negative bruising  Immunologic/Lymphatic: negative recent illness, negative recent sick contact  Musculoskeletal: negative back pain, negative myalgias, negative arthralgias  Neurological:  negative dizziness, negative migraines, negative seizures   Behavior/Psych: negative depression, negative anxiety     VITALS:  Vitals:    11/02/22 0847   BP: 112/78   Position: Sitting   Cuff Size: Medium Adult   Weight: 171 lb (77.6 kg)   Height: 5' 3\" (1.6 m)       PHYSICAL EXAM:  General appearance: no apparent distress, alert and cooperative  HEENT: head atraumatic, normocephalic, trachea midline, moist mucous membranes   Neurologic: alert, oriented, normal speech, no focal findings or movement disorder noted  Lungs: no increased work of breathing   Abdomen: soft, non-tender on palpation,  no guarding, no rebound, no rigidity   Extremities:  no calf tenderness bilaterally, non-edematous bilaterally   Musculoskeletal: no gross abnormalities, range of motion appropriate for age   Psychiatric: mood appropriate, normal affect   Pelvic Exam:chaperone: Lesta Koyanagi PGY 2    Vulva: normal appearing vulva, no masses, tenderness or lesions, normal clitoris    Vagina: normal appearing urethral meatus, normal appearing vaginal mucosa with normal color and discharge, no lesions, no vaginal bleeding     Cervix: normal appearing cervix without pathologic appearing discharge or lesions, no cervical motion tenderness   Uterus:bladder smooth, retroverted, normal shape, consistency and non-tender    Adnexa: non-tender, no palpable masses    Rectal: no hemorrhoids seen     Diagnostics:    US NON OB TRANSVAGINAL  Result Date: 10/18/2022    EXAMINATION: PELVIC ULTRASOUND 10/18/2022 TECHNIQUE: Transvaginal pelvic ultrasound was performed. COMPARISON: None HISTORY: ORDERING SYSTEM PROVIDED HISTORY: Abdominal bloating TECHNOLOGIST PROVIDED HISTORY: This procedure can be scheduled via [employer]. Access your [employer] account by visiting [employer]. see above Reason for Exam: Postmenopausal bleeding, bloating 58-year-old female with postmenopausal bleeding and bloating; FINDINGS: Measurements: Uterus: 6.0 x 3.3 x 3.7 cm. Endometrial stripe: 2 mm. Right Ovary:2.1 x 1.3 x 1.1 cm. Left Ovary: 2.1 x 1.3 x 1.5 cm. Ultrasound Findings: Exam is slightly limited by bowel gas. Patient is postmenopausal. Uterus: Uterus demonstrates normal myometrial echotexture. Retroverted uterus. Endometrial stripe: Endometrial stripe is within normal limits for a postmenopausal female. Right Ovary: Right ovary is within normal limits. Left Ovary:  Left ovary is within normal limits. Color flow projects over the bilateral ovarian parenchyma.  Free Fluid: No evidence of free fluid. Unremarkable transvaginal pelvic ultrasound. Assessment/Plan:    Post Menopausal Bleeding    - VSS   - TVUS Oct 2022 has her stripe measuring 2 mm, ovaries look unremarkable    - UA ordered to rule out any hematuria    - No obvious explanation on physical exam today to explain this episode of bleeding    - Offered to perform endometrial biopsy today to rule out any hyperplasia. Patient states due to having a busy day she did not want to have the biopsy completed today in fear of residual spotting or cramping. Will return to office within the next few weeks to have biopsy completed. She will premedicate with motrin      Vaginal Odor    - Vaginitis collected, will treat if anything is positive      Diagnosis Orders   1. Post-menopausal bleeding  Urinalysis with Microscopic      2. Vaginal odor  Vaginitis DNA Probe        Patient Active Problem List    Diagnosis Date Noted    Hiatal hernia 06/01/2021    Polyp of colon 06/01/2021    Chronic cough 06/01/2021    Anxiety 06/01/2021    Diabetic polyneuropathy associated with type 2 diabetes mellitus ([facility] [address] 09/18/2018    Chronic gout of multiple sites 09/18/2018    Hypothyroidism 05/01/2013    Hyperlipidemia 05/01/2013    Gastroesophageal reflux disease 05/01/2013    Type 2 diabetes mellitus with diabetic polyneuropathy, without long-term current use of insulin ([facility] [address] 09/13/2012    Hypertension 07/25/2012     Orders Placed This Encounter   Procedures    Vaginitis DNA Probe     Standing Status:   Future     Standing Expiration Date:   11/2/2023    Urinalysis with Microscopic     Standing Status:   Future     Standing Expiration Date:   11/2/2023     Order Specific Question:   SPECIFY(EX-CATH,MIDSTREAM,CYSTO,ETC)?      Answer:   midstream     DO Ed Chávez OB/GYN  11/2/2022, 9:22 AM

## 2022-11-03 LAB
CANDIDA SPECIES, DNA PROBE: NEGATIVE
GARDNERELLA VAGINALIS, DNA PROBE: NEGATIVE
SOURCE: NORMAL
TRICHOMONAS VAGINALIS DNA: NEGATIVE

## 2022-11-07 DIAGNOSIS — K21.9 GASTROESOPHAGEAL REFLUX DISEASE: ICD-10-CM

## 2022-11-07 RX ORDER — PANTOPRAZOLE SODIUM 40 MG/1
40 TABLET, DELAYED RELEASE ORAL DAILY
Qty: 90 TABLET | Refills: 1 | Status: SHIPPED | OUTPATIENT
Start: 2022-11-07 | End: 2023-11-07

## 2022-11-07 NOTE — TELEPHONE ENCOUNTER
LOV 5-18-22  LRF 5-11-22    Health Maintenance   Topic Date Due    Shingles vaccine (1 of 2) Never done    Diabetic retinal exam  02/06/2020    COVID-19 Vaccine (3 - Booster for [place] series) 04/12/2022    Annual Wellness Visit (AWV)  05/27/2022    Diabetic foot exam  09/29/2022    Diabetic microalbuminuria test  05/18/2023    Lipids  05/18/2023    A1C test (Diabetic or Prediabetic)  10/03/2023    Depression Screen  10/03/2023    Breast cancer screen  11/02/2024    Pneumococcal 65+ years Vaccine (3 - PPSV23 if available, else PCV20) 11/11/2024    Colorectal Cancer Screen  04/09/2031    DEXA (modify frequency per FRAX score)  Completed    Flu vaccine  Completed    Hepatitis C screen  Completed    Hepatitis A vaccine  Aged Out    Hib vaccine  Aged Out    Meningococcal (ACWY) vaccine  Aged Out             (applicable per patient's age: Cancer Screenings, Depression Screening, Fall Risk Screening, Immunizations)    Hemoglobin A1C (%)   Date Value   10/03/2022 6.8 (H)   05/18/2022 6.7 (H)   12/15/2021 7.1 (H)     Microalb/Crt.  Ratio (mcg/mg creat)   Date Value   05/18/2022 Can not be calculated     LDL Cholesterol (mg/dL)   Date Value   05/18/2022 42     LDL Calculated (mg/dL)   Date Value   04/13/2015 32     AST (U/L)   Date Value   10/03/2022 33 (H)     ALT (U/L)   Date Value   10/03/2022 39 (H)     BUN (mg/dL)   Date Value   10/03/2022 18      (goal A1C is < 7)   (goal LDL is <100) need 30-50% reduction from baseline     BP Readings from Last 3 Encounters:   11/02/22 112/78   10/03/22 114/76   05/18/22 124/82    (goal /80)      All Future Testing planned in [facility]:  Lab Frequency Next Occurrence   Urinalysis with Microscopic Once 11/02/2022       Next Visit Date:  Future Appointments   Date Time Provider Kati White   11/14/2022 10:45 AM Yolande Teixeira [facility]   12/21/2022 12:30 PM Karyle Epps, MD [id number]   12/21/2022  1:00 PM Davis Foster Mitchem-Walter, MD T[facility] Frankel [facility]            Patient Active Problem List:     Hypertension     Type 2 diabetes mellitus with diabetic polyneuropathy, without long-term current use of insulin (HCC)     Hypothyroidism     Hyperlipidemia     Gastroesophageal reflux disease     Diabetic polyneuropathy associated with type 2 diabetes mellitus (HCC)     Chronic gout of multiple sites     Hiatal hernia     Polyp of colon     Chronic cough     Anxiety

## 2022-12-07 ENCOUNTER — HOSPITAL ENCOUNTER (OUTPATIENT)
Age: 67
Setting detail: SPECIMEN
Discharge: HOME | End: 2022-12-07

## 2022-12-07 ENCOUNTER — PROCEDURE VISIT (OUTPATIENT)
Dept: OBGYN CLINIC | Age: 67
End: 2022-12-07

## 2022-12-07 VITALS
BODY MASS INDEX: 30.65 KG/M2 | WEIGHT: 173 LBS | HEIGHT: 63 IN | DIASTOLIC BLOOD PRESSURE: 78 MMHG | HEART RATE: 63 BPM | SYSTOLIC BLOOD PRESSURE: 137 MMHG

## 2022-12-07 DIAGNOSIS — L75.0 URINARY BODY ODOR: ICD-10-CM

## 2022-12-07 DIAGNOSIS — N95.0 POST-MENOPAUSAL BLEEDING: ICD-10-CM

## 2022-12-07 DIAGNOSIS — R82.998 OTHER ABNORMAL FINDINGS IN URINE: ICD-10-CM

## 2022-12-07 DIAGNOSIS — N95.0 POST-MENOPAUSAL BLEEDING: Primary | ICD-10-CM

## 2022-12-07 NOTE — PROGRESS NOTES
[address] [facility]  [facility]   [place], [address] [facility]  [address]: 12/7/22      HPI: Pt was seen as a new patient on 11/2/22 as a referral for post menopausal bleeding. The bleeding was present with wiping in October. Endometrial Biopsy:  The patient was counseled on the procedure. Risks, benefits and alternatives were reviewed. The patient is aware that this is diagnostic and not curative and a second procedure may be needed. A consent was reviewed and obtained. The patient was positioned comfortably on the exam table. After a bi-manual exam; the uterus was found to be  anteverted with a size of 6 cm. There was no adnexal masses and the bladder was smooth, non-tender and without palpable masses. A sterile speculum was placed into the vagina and the cervix was identified. It was cleansed with betadine. A single tooth tenaculum was needed to stabilize the cervix. The aspirator was then attempted to be passed through the os, but the os was noted to be stenotic. Dilators were attempted to be used but could not be passed. We discussed the finding of 2 mm stripe on US and the low probability of uterine pathology. Pt will call if she has further episodes of bleeding and is aware that she would require a D&C in the OR at that point. The patient tolerated the procedure well. Post procedure restrictions were reviewed and given to the patient. All counts and instruments were correct at the end of the procedure.       Dipika Bello MD  12/7/22  10:30 AM

## 2022-12-08 LAB
CULTURE: NORMAL
SPECIMEN DESCRIPTION: NORMAL

## 2022-12-09 ENCOUNTER — OFFICE VISIT (OUTPATIENT)
Dept: FAMILY MEDICINE CLINIC | Age: 67
End: 2022-12-09

## 2022-12-09 VITALS
TEMPERATURE: 98 F | RESPIRATION RATE: 15 BRPM | BODY MASS INDEX: 30.89 KG/M2 | HEART RATE: 56 BPM | WEIGHT: 174.4 LBS | SYSTOLIC BLOOD PRESSURE: 112 MMHG | DIASTOLIC BLOOD PRESSURE: 76 MMHG

## 2022-12-09 DIAGNOSIS — M25.551 RIGHT HIP PAIN: Primary | ICD-10-CM

## 2022-12-09 NOTE — PROGRESS NOTES
Ceci Traore (:  1955) is a 77 y.o. female,Established patient, here for evaluation of the following chief complaint(s):  Hip Pain         ASSESSMENT/PLAN:    1. Right hip pain  -     XR HIP RIGHT (2-3 VIEWS); Future      Obtain right hip x-ray  Further treatment to be recommended after review of x-ray  Consider physical therapy vs. Ortho consult after review of x-ray  Call with concerns      Return if symptoms worsen or fail to improve. Subjective     HPI    Patient presents today for evaluation of right hip pain after tripping over her dog 2 weeks ago. She states that she did tripped over her 150 pound dog and fell onto her right hip. She did have severe pain after that fall. She then fell onto her right side again 1 week ago and she has had persistent pain since then as well. She has tried some conservative measures with over-the-counter medications which have not been helpful. cmw    Review of Systems   Constitutional:  Negative for appetite change, fatigue and fever. Genitourinary:  Vaginal bleeding: right hip pain. Musculoskeletal:  Positive for arthralgias and gait problem (at times because of hip pain). Negative for back pain and joint swelling. Allergic/Immunologic: Negative for immunocompromised state. Neurological:  Negative for dizziness, light-headedness and headaches. Objective     Physical Exam  Constitutional:       General: She is not in acute distress. Appearance: Normal appearance. She is not ill-appearing, toxic-appearing or diaphoretic. HENT:      Head: Normocephalic. Pulmonary:      Effort: Pulmonary effort is normal.   Musculoskeletal:      Cervical back: Normal range of motion. Right hip: Tenderness present. Decreased range of motion (pain with external rotation). Decreased strength. Left hip: Normal.      Right knee: Normal.      Left knee: Normal.      Right lower leg: No edema. Left lower leg: No edema.    Neurological: Mental Status: She is alert. An electronic signature was used to authenticate this note.     --Janet Villa MD

## 2022-12-11 ASSESSMENT — ENCOUNTER SYMPTOMS: BACK PAIN: 0

## 2022-12-14 DIAGNOSIS — M25.551 RIGHT HIP PAIN: ICD-10-CM

## 2022-12-16 DIAGNOSIS — M25.551 RIGHT HIP PAIN: ICD-10-CM

## 2022-12-16 DIAGNOSIS — M25.552 LEFT HIP PAIN: Primary | ICD-10-CM

## 2022-12-30 NOTE — TELEPHONE ENCOUNTER
Last visit: OV 5/18/22, Same Day 12/9/2022  Last Med refill: 7/12/2022  Does patient have enough medication for 72 hours:     Next Visit Date:  Future Appointments   Date Time Provider Kati Cindy   1/27/2023 10:00 AM MD Amanda Pham. Kayla Vergara [facility] Maintenance   Topic Date Due    Shingles vaccine (1 of 2) Never done    Diabetic Alb to Cr ratio (uACR) test  09/23/2017    Diabetic retinal exam  02/06/2020    COVID-19 Vaccine (3 - Booster for [place] series) 04/12/2022    Annual Wellness Visit (AWV)  05/27/2022    Diabetic foot exam  09/29/2022    Lipids  05/18/2023    A1C test (Diabetic or Prediabetic)  10/03/2023    Depression Screen  10/03/2023    GFR test (Diabetes, CKD 3-4, OR last GFR 15-59)  10/03/2023    Breast cancer screen  11/02/2024    Pneumococcal 65+ years Vaccine (3 - PPSV23 if available, else PCV20) 11/11/2024    Colorectal Cancer Screen  04/09/2031    DEXA (modify frequency per FRAX score)  Completed    Flu vaccine  Completed    Hepatitis C screen  Completed    Hepatitis A vaccine  Aged Out    Hib vaccine  Aged Out    Meningococcal (ACWY) vaccine  Aged Out       Hemoglobin A1C (%)   Date Value   10/03/2022 6.8 (H)   05/18/2022 6.7 (H)   12/15/2021 7.1 (H)             ( goal A1C is < 7)   Microalb/Crt.  Ratio (mcg/mg creat)   Date Value   05/18/2022 Can not be calculated     LDL Cholesterol (mg/dL)   Date Value   05/18/2022 42   12/15/2021 46     LDL Calculated (mg/dL)   Date Value   04/13/2015 32   07/22/2014 57       (goal LDL is <100)   AST (U/L)   Date Value   10/03/2022 33 (H)     ALT (U/L)   Date Value   10/03/2022 39 (H)     BUN (mg/dL)   Date Value   10/03/2022 18     BP Readings from Last 3 Encounters:   12/09/22 112/76   12/07/22 137/78   11/02/22 112/78          (goal 120/80)    All Future Testing planned in [facility]  Lab Frequency Next Occurrence   Lipid Panel Once 01/04/2023   Comprehensive Metabolic Panel Once 71/83/6442   Hemoglobin A1C Once 01/04/2023 CBC Once 01/04/2023   TSH Once 01/04/2023   T4, Free Once 01/04/2023   Uric Acid Once 01/04/2023   Urinalysis with Microscopic Once 11/02/2022               Patient Active Problem List:     Hypertension     Type 2 diabetes mellitus with diabetic polyneuropathy, without long-term current use of insulin (HCC)     Hypothyroidism     Hyperlipidemia     Gastroesophageal reflux disease     Diabetic polyneuropathy associated with type 2 diabetes mellitus (HCC)     Chronic gout of multiple sites     Hiatal hernia     Polyp of colon     Chronic cough     Anxiety

## 2023-01-02 RX ORDER — LEVOTHYROXINE SODIUM 137 UG/1
137 TABLET ORAL DAILY
Qty: 90 TABLET | Refills: 1 | Status: SHIPPED | OUTPATIENT
Start: 2023-01-02 | End: 2024-01-02

## 2023-01-25 DIAGNOSIS — M1A.09X0 CHRONIC GOUT OF MULTIPLE SITES, UNSPECIFIED CAUSE: ICD-10-CM

## 2023-01-25 RX ORDER — COLCHICINE 0.6 MG/1
TABLET ORAL
Qty: 30 TABLET | Refills: 5 | Status: SHIPPED | OUTPATIENT
Start: 2023-01-25

## 2023-01-25 NOTE — TELEPHONE ENCOUNTER
Last visit: 12/9/2022    Last Med refill: 7/21/2022  Does patient have enough medication for 72 hours: Yes    Next Visit Date:  Future Appointments   Date Time Provider Kati White   1/27/2023 10:00 AM MD Amanda Pinzon. Kayla Vergara [address] Maintenance   Topic Date Due    Shingles vaccine (1 of 2) Never done    Diabetic retinal exam  02/06/2020    COVID-19 Vaccine (3 - Booster for [employer] series) 04/12/2022    Annual Wellness Visit (AWV)  05/27/2022    Diabetic foot exam  09/29/2022    Diabetic Alb to Cr ratio (uACR) test  05/18/2023    Lipids  05/18/2023    A1C test (Diabetic or Prediabetic)  10/03/2023    Depression Screen  10/03/2023    GFR test (Diabetes, CKD 3-4, OR last GFR 15-59)  10/03/2023    Breast cancer screen  11/02/2024    Pneumococcal 65+ years Vaccine (3 - PPSV23 if available, else PCV20) 11/11/2024    Colorectal Cancer Screen  04/09/2031    DEXA (modify frequency per FRAX score)  Completed    Flu vaccine  Completed    Hepatitis C screen  Completed    Hepatitis A vaccine  Aged Out    Hib vaccine  Aged Out    Meningococcal (ACWY) vaccine  Aged Out       Hemoglobin A1C (%)   Date Value   10/03/2022 6.8 (H)   05/18/2022 6.7 (H)   12/15/2021 7.1 (H)             ( goal A1C is < 7)   Microalb/Crt.  Ratio (mcg/mg creat)   Date Value   05/18/2022 Can not be calculated     LDL Cholesterol (mg/dL)   Date Value   05/18/2022 42   12/15/2021 46     LDL Calculated (mg/dL)   Date Value   04/13/2015 32   07/22/2014 57       (goal LDL is <100)   AST (U/L)   Date Value   10/03/2022 33 (H)     ALT (U/L)   Date Value   10/03/2022 39 (H)     BUN (mg/dL)   Date Value   10/03/2022 18     BP Readings from Last 3 Encounters:   12/09/22 112/76   12/07/22 137/78   11/02/22 112/78          (goal 120/80)    All Future Testing planned in [facility]  Lab Frequency Next Occurrence   Lipid Panel Once 01/04/2023   Comprehensive Metabolic Panel Once 44/16/2158   Hemoglobin A1C Once 01/04/2023   CBC Once 01/04/2023   TSH Once 01/04/2023   T4, Free Once 01/04/2023   Uric Acid Once 01/04/2023   Urinalysis with Microscopic Once 11/02/2022               Patient Active Problem List:     Hypertension     Type 2 diabetes mellitus with diabetic polyneuropathy, without long-term current use of insulin (HCC)     Hypothyroidism     Hyperlipidemia     Gastroesophageal reflux disease     Diabetic polyneuropathy associated with type 2 diabetes mellitus (HCC)     Chronic gout of multiple sites     Hiatal hernia     Polyp of colon     Chronic cough     Anxiety

## 2023-01-27 ENCOUNTER — OFFICE VISIT (OUTPATIENT)
Dept: FAMILY MEDICINE CLINIC | Age: 68
End: 2023-01-27

## 2023-01-27 ENCOUNTER — HOSPITAL ENCOUNTER (OUTPATIENT)
Age: 68
Setting detail: SPECIMEN
Discharge: HOME | End: 2023-01-27

## 2023-01-27 VITALS
HEART RATE: 66 BPM | TEMPERATURE: 98.4 F | DIASTOLIC BLOOD PRESSURE: 82 MMHG | SYSTOLIC BLOOD PRESSURE: 120 MMHG | RESPIRATION RATE: 14 BRPM | BODY MASS INDEX: 31.18 KG/M2 | WEIGHT: 176 LBS | HEIGHT: 63 IN

## 2023-01-27 DIAGNOSIS — E03.9 HYPOTHYROIDISM, UNSPECIFIED TYPE: ICD-10-CM

## 2023-01-27 DIAGNOSIS — Z13.820 SCREENING FOR OSTEOPOROSIS: ICD-10-CM

## 2023-01-27 DIAGNOSIS — M1A.09X0 CHRONIC GOUT OF MULTIPLE SITES, UNSPECIFIED CAUSE: ICD-10-CM

## 2023-01-27 DIAGNOSIS — I10 ESSENTIAL HYPERTENSION: ICD-10-CM

## 2023-01-27 DIAGNOSIS — K21.9 GASTROESOPHAGEAL REFLUX DISEASE, UNSPECIFIED WHETHER ESOPHAGITIS PRESENT: ICD-10-CM

## 2023-01-27 DIAGNOSIS — R05.3 CHRONIC COUGH: ICD-10-CM

## 2023-01-27 DIAGNOSIS — E78.5 HYPERLIPIDEMIA, UNSPECIFIED HYPERLIPIDEMIA TYPE: ICD-10-CM

## 2023-01-27 DIAGNOSIS — F41.9 ANXIETY: ICD-10-CM

## 2023-01-27 DIAGNOSIS — M16.11 PRIMARY OSTEOARTHRITIS OF RIGHT HIP: ICD-10-CM

## 2023-01-27 DIAGNOSIS — Z78.0 POSTMENOPAUSAL: ICD-10-CM

## 2023-01-27 DIAGNOSIS — K63.5 POLYP OF COLON, UNSPECIFIED PART OF COLON, UNSPECIFIED TYPE: ICD-10-CM

## 2023-01-27 DIAGNOSIS — M25.551 RIGHT HIP PAIN: ICD-10-CM

## 2023-01-27 DIAGNOSIS — E11.42 TYPE 2 DIABETES MELLITUS WITH DIABETIC POLYNEUROPATHY, WITHOUT LONG-TERM CURRENT USE OF INSULIN (HCC): ICD-10-CM

## 2023-01-27 DIAGNOSIS — E11.42 DIABETIC POLYNEUROPATHY ASSOCIATED WITH TYPE 2 DIABETES MELLITUS (HCC): ICD-10-CM

## 2023-01-27 DIAGNOSIS — Z00.00 INITIAL MEDICARE ANNUAL WELLNESS VISIT: Primary | ICD-10-CM

## 2023-01-27 DIAGNOSIS — K44.9 HIATAL HERNIA: ICD-10-CM

## 2023-01-27 LAB
ESTIMATED AVERAGE GLUCOSE: 148 MG/DL
HBA1C MFR BLD: 6.8 % (ref 4–6)
HCT VFR BLD CALC: 41.5 % (ref 36.3–47.1)
HEMOGLOBIN: 13.9 G/DL (ref 11.9–15.1)
MCH RBC QN AUTO: 28.9 PG (ref 25.2–33.5)
MCHC RBC AUTO-ENTMCNC: 33.5 G/DL (ref 28.4–34.8)
MCV RBC AUTO: 86.3 FL (ref 82.6–102.9)
NRBC AUTOMATED: 0 PER 100 WBC
PDW BLD-RTO: 12.8 % (ref 11.8–14.4)
PLATELET # BLD: 268 K/UL (ref 138–453)
PMV BLD AUTO: 11.4 FL (ref 8.1–13.5)
RBC # BLD: 4.81 M/UL (ref 3.95–5.11)
THYROXINE, FREE: 1.71 NG/DL (ref 0.93–1.7)
TSH SERPL DL<=0.05 MIU/L-ACNC: 0.67 UIU/ML (ref 0.3–5)
URIC ACID: 8.2 MG/DL (ref 2.4–5.7)
WBC # BLD: 7 K/UL (ref 3.5–11.3)

## 2023-01-27 ASSESSMENT — PATIENT HEALTH QUESTIONNAIRE - PHQ9
SUM OF ALL RESPONSES TO PHQ QUESTIONS 1-9: 0
SUM OF ALL RESPONSES TO PHQ QUESTIONS 1-9: 0
2. FEELING DOWN, DEPRESSED OR HOPELESS: 0
SUM OF ALL RESPONSES TO PHQ QUESTIONS 1-9: 0
SUM OF ALL RESPONSES TO PHQ QUESTIONS 1-9: 0
SUM OF ALL RESPONSES TO PHQ9 QUESTIONS 1 & 2: 0
1. LITTLE INTEREST OR PLEASURE IN DOING THINGS: 0

## 2023-01-27 NOTE — PROGRESS NOTES
Medicare Annual Wellness Visit    Andree Reich is here for Medicare AWV and Diabetes    Assessment & Plan     Initial Medicare annual wellness visit        Recommendations for Preventive Services Due: see orders and patient instructions/AVS.  Recommended screening schedule for the next 5-10 years is provided to the patient in written form: see Patient Instructions/AVS.     Return for Medicare Annual Wellness Visit in 1 year. Subjective       Patient's complete Health Risk Assessment and screening values have been reviewed and are found in Flowsheets. The following problems were reviewed today and where indicated follow up appointments were made and/or referrals ordered. Positive Risk Factor Screenings with Interventions:               General HRA Questions:  Select all that apply: (!) Stress    Stress Interventions:  Patient declined any further interventions or treatment       Weight and Activity:  Physical Activity: Unknown    Days of Exercise per Week: 0 days    Minutes of Exercise per Session: Not on file     On average, how many days per week do you engage in moderate to strenuous exercise (like a brisk walk)?: 0 days  Have you lost any weight without trying in the past 3 months?: No  Body mass index: (!) 31.17  Obesity Interventions:  Patient encouraged to follow a strict diabetic diet and to participate in regular exercise to promote weight reduction / reduction in BMI over time. Dentist Screen:  Have you seen the dentist within the past year?: (!) No    Intervention:  Advised to schedule with their dentist     Vision Screen:  Do you have difficulty driving, watching TV, or doing any of your daily activities because of your eyesight?: No  Have you had an eye exam within the past year?: (!) No  No results found.     Interventions:   Patient encouraged to make appointment with their eye specialist                      Objective   Vitals:    01/27/23 1006   BP: 120/82   Site: Right [facility] Arm   Position: Sitting   Cuff Size: Medium Adult   Pulse: 66   Resp: 14   Temp: 98.4 °F (36.9 °C)   TempSrc: Temporal   Weight: 176 lb (79.8 kg)   Height: 5' 3\" (1.6 m)      Body mass index is 31.18 kg/m². Allergies   Allergen Reactions    Sulfa Antibiotics Hives    Tetanus Toxoids Hives     Prior to Visit Medications    Medication Sig Taking? Authorizing Provider   colchicine (COLCRYS) 0.6 MG tablet TAKE ONE TABLET BY MOUTH EVERY MORNING Yes Isela Aviles MD   levothyroxine (SYNTHROID) 137 MCG tablet Take 1 tablet by mouth Daily Yes Isela Aviles MD   pantoprazole (PROTONIX) 40 MG tablet Take 1 tablet by mouth daily Yes Isela Aviles MD   atorvastatin (LIPITOR) 20 MG tablet TAKE ONE TABLET BY MOUTH DAILY Yes Isela Aviles MD   carvedilol (COREG) 25 MG tablet TAKE ONE TABLET BY MOUTH TWICE A DAY Yes Isela Aviles MD   metFORMIN (GLUCOPHAGE-XR) 500 MG extended release tablet TAKE ONE TABLET BY MOUTH THREE TIMES A DAY WITH BREAKFAST Yes Isela Aviles MD   losartan-hydroCHLOROthiazide (HYZAAR) 100-25 MG per tablet TAKE 1 TABLET BY MOUTH DAILY Yes Isela Aviles MD   aspirin 81 MG EC tablet Take 81 mg by mouth daily. Yes Historical Provider, MD   docusate sodium (COLACE) 100 MG capsule Take 100 mg by mouth daily. Yes Historical Provider, MD   clobetasol (TEMOVATE) 0.05 % ointment Apply a think layer topically 1 time daily. Isela Aviles MD   allopurinol (ZYLOPRIM) 100 MG tablet Take 2 tablets by mouth daily  Patient not taking: No sig reported  Isela Aviles MD   atorvastatin (LIPITOR) 40 MG tablet Take 1 tablet by mouth daily  Patient not taking: Reported on 12/7/2022  Spring Keys APRN - CNP   glucose blood VI test strips [facility] BLOOD GLUCOSE TEST) strip Test blood sugar once daily.  Dx: Diabetes mellitus type 2 in obese 250.00, 278.00  Isela Aviles MD       CareTeam (Including outside providers/suppliers regularly involved in providing care):     Patient Care Team:  Maura Boswell MD as PCP - General (Pediatrics)  Maura Boswell MD as PCP - [facility] Empaneled Provider     Reviewed and updated this visit:  Tobacco  Allergies  Meds  Problems  Med Hx  Surg Hx  Soc Hx  Fam Hx

## 2023-01-27 NOTE — PROGRESS NOTES
Omega Foster (:  1955) is a 79 y.o. female,Established patient, here for evaluation of the following chief complaint(s):    Medicare AWV and Diabetes         ASSESSMENT/PLAN:    1. Initial Medicare annual wellness visit  2. Type 2 diabetes mellitus with diabetic polyneuropathy, without long-term current use of insulin ([facility] [address]  3. Diabetic polyneuropathy associated with type 2 diabetes mellitus ([facility] [address]  4. Essential hypertension  5. Hyperlipidemia, unspecified hyperlipidemia type  6. Hypothyroidism, unspecified type  7. Gastroesophageal reflux disease, unspecified whether esophagitis present  8. Hiatal hernia  9. Chronic cough  10. Polyp of colon, unspecified part of colon, unspecified type  11. Chronic gout of multiple sites, unspecified cause  12. Anxiety  13. Right hip pain  -     External Referral To Physical Therapy  14. Primary osteoarthritis of right hip  15. Postmenopausal  -     DEXA BONE DENSITY 2 SITES; Future  16. Screening for osteoporosis  -     DEXA BONE DENSITY 2 SITES; Future    Obtain repeat labs in 3 to 4 months as ordered  Strict diabetic diet and regular exercise encouraged  Weight reduction encouraged  Consider discontinuing metformin and starting mounjaro or the like in the next several months   Yearly eye and foot exams recommended  Monitor blood pressure regularly  Follow-up with cardiology as scheduled  Continue Lipitor  Follow-up with GI as needed  Colonoscopy due in   Start allopurinol  Continue colchicine for now and discontinue after starting allopurinol  Ativan only as needed for severe anxiety  Start physical therapy   Obtain dexascan  Shingles vaccine recommended  COVID-19 vaccine recommended  Call with concerns       Return in 3 months (on 2023) for Medicare Annual Wellness Visit in 1 year, routine follow up.          Subjective     HPI    Patient presents today for routine follow-up of her chronic medical problems which include type 2 diabetes with neuropathy, hypertension, hyperlipidemia, hypothyroidism, GERD, hiatal hernia, colon polyps and gout. She also has a history of anxiety. She states that she has been checking her blood sugars occasionally and they are doing well. She does continue on metformin but does continue to have what I suspect are GI side effects from this. We did discuss possibly discontinuing this and trying kuhn Tamika or something similar in the future. She would like to wait and consider this in the next 3 months. She is due for her eye exam and was encouraged to schedule this. She does continue to have numbness and tingling in her toes which I suspect is likely secondary to diabetic neuropathy. She thinks that this may be side effects from her colchicine. Her blood pressure is well controlled with her current medications. She does see Dr. Mendel Laser regularly. She does have a history of hyperlipidemia that is treated with Lipitor. She is tolerating this without side effects. She does have a history of hypothyroidism and does take her thyroid supplement regularly. She does have a history of GERD and continues on pantoprazole. She did have an EGD in 2021 with Dr. Lance Mckeon that did show a hiatal hernia. He advised her to continue on pantoprazole and watch what she is eating. She does have a chronic cough with increased phlegm at night. It has been suspected that her symptoms are likely secondary to her GERD. She did have a colonoscopy on 4/9/2021 that showed 3 polyps. One was hyperplastic polyp and 2 were tubular adenomas. Her next colonoscopy will be due in 2026. She does have a history of chronic gout that is treated with colchicine. I have wanted to start her on allopurinol but she has been hesitant to do this. She does need to make an appointment with podiatry. She does have a history of anxiety and does have a prescription for Ativan that she will use very infrequently for this. She has not used this in over a year.   She does continue to complain of right hip pain. She did have an xray that showed mild osteoarthritis. She was supposed to go to physical therapy. She does see dermatology regularly. She did see OB/GYN and did have an endometrial biopsy that was normal.  She will follow-up with Dr. Ladonna Gómez as needed. cmw    Review of Systems   Constitutional:  Positive for fatigue. Negative for appetite change, chills, fever and unexpected weight change. HENT:  Negative for congestion, ear pain, postnasal drip, rhinorrhea, sore throat and voice change. Eyes:  Negative for pain, discharge, redness and visual disturbance. Respiratory:  Positive for cough (occasional). Negative for chest tightness, shortness of breath and wheezing. Cardiovascular:  Positive for palpitations (occasional). Negative for chest pain and leg swelling. Gastrointestinal:  Positive for abdominal distention, abdominal pain and nausea. Negative for constipation, diarrhea and vomiting. GERD stable with protonix. Endocrine: Negative for polydipsia, polyphagia and polyuria. Genitourinary:  Negative for decreased urine volume, dysuria, frequency, hematuria and urgency. Musculoskeletal:  Positive for arthralgias (right hip pain). Negative for myalgias and neck pain. Skin:  Negative for color change and rash. Allergic/Immunologic: Negative for immunocompromised state. Neurological:  Positive for numbness. Negative for dizziness, weakness, light-headedness and headaches. Hematological:  Negative for adenopathy. Does not bruise/bleed easily. Psychiatric/Behavioral:  Negative for dysphoric mood and sleep disturbance. The patient is nervous/anxious (rarely). Objective     Physical Exam  Vitals and nursing note reviewed. Constitutional:       General: She is not in acute distress. Appearance: Normal appearance. She is well-developed. She is obese. She is not ill-appearing, toxic-appearing or diaphoretic.       Comments: obese HENT:      Head: Normocephalic. Right Ear: Tympanic membrane, ear canal and external ear normal. There is no impacted cerumen. Left Ear: Tympanic membrane, ear canal and external ear normal. There is no impacted cerumen. Nose: Nose normal. No congestion or rhinorrhea. Mouth/Throat:      Mouth: Mucous membranes are moist.      Pharynx: No oropharyngeal exudate. Eyes:      General: No scleral icterus. Right eye: No discharge. Left eye: No discharge. Conjunctiva/sclera: Conjunctivae normal.      Pupils: Pupils are equal, round, and reactive to light. Neck:      Thyroid: No thyromegaly. Vascular: No JVD. Cardiovascular:      Rate and Rhythm: Normal rate and regular rhythm. Pulses: Normal pulses. Heart sounds: Normal heart sounds. No murmur heard. Pulmonary:      Effort: Pulmonary effort is normal. No respiratory distress. Breath sounds: Normal breath sounds. No stridor. No decreased breath sounds, wheezing, rhonchi or rales. Chest:      Chest wall: No tenderness. Abdominal:      General: Bowel sounds are normal.      Palpations: Abdomen is soft. There is no mass. Tenderness: There is no abdominal tenderness. There is no right CVA tenderness or left CVA tenderness. Musculoskeletal:         General: No tenderness. Cervical back: Normal range of motion and neck supple. Right hip: Decreased range of motion. Right lower leg: No edema. Left lower leg: No edema. Lymphadenopathy:      Cervical: No cervical adenopathy. Skin:     General: Skin is warm. Capillary Refill: Capillary refill takes less than 2 seconds. Coloration: Skin is not pale. Findings: No erythema or rash. Neurological:      General: No focal deficit present. Mental Status: She is alert and oriented to person, place, and time. Cranial Nerves: No cranial nerve deficit. Motor: No weakness or abnormal muscle tone.       Coordination: Coordination normal.      Gait: Gait normal.      Deep Tendon Reflexes: Reflexes are normal and symmetric. Psychiatric:         Mood and Affect: Mood normal.         Behavior: Behavior normal.         Thought Content: Thought content normal.         Judgment: Judgment normal.                An electronic signature was used to authenticate this note.     --Jase Khanna MD

## 2023-01-27 NOTE — PATIENT INSTRUCTIONS
Learning About Stress  What is stress? Stress is what you feel when you have to handle more than you are used to. Stress is a fact of life for most people, and it affects everyone differently. What causes stress for you may not be stressful for someone else. A lot of things can cause stress. You may feel stress when you go on a job interview, take a test, or run a race. This kind of short-term stress is normal and even useful. It can help you if you need to work hard or react quickly. For example, stress can help you finish an important job on time. Stress also can last a long time. Long-term stress is caused by stressful situations or events. Examples of long-term stress include long-term health problems, ongoing problems at work, or conflicts in your family. Long-term stress can harm your health. How does stress affect your health? When you are stressed, your body responds as though you are in danger. It makes hormones that speed up your heart, make you breathe faster, and give you a burst of energy. This is called the fight-or-flight stress response. If the stress is over quickly, your body goes back to normal and no harm is done. But if stress happens too often or lasts too long, it can have bad effects. Long-term stress can make you more likely to get sick, and it can make symptoms of some diseases worse. If you tense up when you are stressed, you may develop neck, shoulder, or low back pain. Stress is linked to high blood pressure and heart disease. Stress also harms your emotional health. It can make you fermin, tense, or depressed. Your relationships may suffer, and you may not do well at work or school. What can you do to manage stress? How to relax your mind   Write. It may help to write about things that are bothering you. This helps you find out how much stress you feel and what is causing it. When you know this, you can find better ways to cope. Let your feelings out.  Talk, laugh, cry, and express anger when you need to. Talking with friends, family, a counselor, or a member of the clergy about your feelings is a healthy way to relieve stress. Do something you enjoy. For example, listen to music or go to a movie. Practice your hobby or do volunteer work. Meditate. This can help you relax, because you are not worrying about what happened before or what may happen in the future. Do guided imagery. Imagine yourself in any setting that helps you feel calm. You can use audiotapes, books, or a teacher to guide you. How to relax your body   Do something active. Exercise or activity can help reduce stress. Walking is a great way to get started. Even everyday activities such as housecleaning or yard work can help. Do breathing exercises. For example:  From a standing position, bend forward from the waist with your knees slightly bent. Let your arms dangle close to the floor. Breathe in slowly and deeply as you return to a standing position. Roll up slowly and lift your head last.  Hold your breath for just a few seconds in the standing position. Breathe out slowly and bend forward from the waist.  Try yoga or jonathan chi. These techniques combine exercise and meditation. You may need some training at first to learn them. What can you do to prevent stress? Manage your time. This helps you find time to do the things you want and need to do. Get enough sleep. Your body recovers from the stresses of the day while you are sleeping. Get support. Your family, friends, and community can make a difference in how you experience stress. Where can you learn more? Go to http://www.phelps.com/ and enter N032 to learn more about \"Learning About Stress. \"  Current as of: October 6, 2021               Content Version: 13.5  © 8744-2885 Healthwise, [employer]. Care instructions adapted under license by [facility] ([facility]).  If you have questions about a medical condition or this instruction, always ask your healthcare professional. [address] any warranty or liability for your use of this information. Learning About Dental Care for Older Adults  Dental care for older adults: Overview  Dental care for older people is much the same as for younger adults. But older adults do have concerns that younger adults do not. Older adults may have problems with gum disease and decay on the roots of their teeth. They may need missing teeth replaced or broken fillings fixed. Or they may have dentures that need to be cared for. Some older adults may have trouble holding a toothbrush. You can help remind the person you are caring for to brush and floss their teeth or to clean their dentures. In some cases, you may need to do the brushing and other dental care tasks. People who have trouble using their hands or who have dementia may need this extra help. How can you help with dental care? Normal dental care  To keep the teeth and gums healthy:  Brush the teeth with fluoride toothpaste twice a day--in the morning and at night--and floss at least once a day. Plaque can quickly build up on the teeth of older adults. Watch for the signs of gum disease. These signs include gums that bleed after brushing or after eating hard foods, such as apples. See a dentist regularly. Many experts recommend checkups every 6 months. Keep the dentist up to date on any new medications the person is taking. Encourage a balanced diet that includes whole grains, vegetables, and fruits, and that is low in saturated fat and sodium. Encourage the person you're caring for not to use tobacco products. They can affect dental and general health. Many older adults have a fixed income and feel that they can't afford dental care. But most [facility] and [facility] have programs in which dentists help older adults by lowering fees.  Contact your area's public health offices or  for information about dental care in your area.  Using a toothbrush  Older adults with arthritis sometimes have trouble brushing their teeth because they can't easily hold the toothbrush. Their hands and fingers may be stiff, painful, or weak. If this is the case, you can: Offer an electric toothbrush. Enlarge the handle of a non-electric toothbrush by wrapping a sponge, an elastic bandage, or adhesive tape around it. Push the toothbrush handle through a ball made of rubber or soft foam.  Make the handle longer and thicker by taping Popsicle sticks or tongue depressors to it. You may also be able to buy special toothbrushes, toothpaste dispensers, and floss holders. Your doctor may recommend a soft-bristle toothbrush if the person you care for bleeds easily. Bleeding can happen because of a health problem or from certain medicines. A toothpaste for sensitive teeth may help if the person you care for has sensitive teeth. How do you brush and floss someone's teeth? If the person you are caring for has a hard time cleaning their teeth on their own, you may need to brush and floss their teeth for them. It may be easiest to have the person sit and face away from you, and to sit or stand behind them. That way you can steady their head against your arm as you reach around to floss and brush their teeth. Choose a place that has good lighting and is comfortable for both of you. Before you begin, gather your supplies. You will need gloves, floss, a toothbrush, and a container to hold water if you are not near a sink. Wash and dry your hands well and put on gloves. Start by flossing:  Gently work a piece of floss between each of the teeth toward the gums. A plastic flossing tool may make this easier, and they are available at most [facility]. Curve the floss around each tooth into a U-shape and gently slide it under the gum line. Move the floss firmly up and down several times to scrape off the plaque.   After you've finished flossing, throw away the used floss and begin brushing:  Wet the brush and apply toothpaste. Place the brush at a 45-degree angle where the teeth meet the gums. Press firmly, and move the brush in small circles over the surface of the teeth. Be careful not to brush too hard. Vigorous brushing can make the gums pull away from the teeth and can scratch the tooth enamel. Brush all surfaces of the teeth, on the tongue side and on the cheek side. Pay special attention to the front teeth and all surfaces of the back teeth. Brush chewing surfaces with short back-and-forth strokes. After you've finished, help the person rinse the remaining toothpaste from their mouth. Where can you learn more? Go to http://www.woods.com/ and enter F944 to learn more about \"Learning About Dental Care for Older Adults. \"  Current as of: June 16, 2022               Content Version: 13.5  © 2006-2022 [employer]. Care instructions adapted under license by [facility] ([facility]). If you have questions about a medical condition or this instruction, always ask your healthcare professional. [place] [address] any warranty or liability for your use of this information. Learning About Vision Tests  What are vision tests? The four most common vision tests are visual acuity tests, refraction, visual field tests, and color vision tests. Visual acuity (sharpness) tests  These tests are used: To see if you need glasses or contact lenses. To monitor an eye problem. To check an eye injury. Visual acuity tests are done as part of routine exams. You may also have this test when you get your 's license or apply for some types of jobs. Visual field tests  These tests are used: To check for vision loss in any area of your range of vision. To screen for certain eye diseases. To look for nerve damage after a stroke, head injury, or other problem that could reduce blood flow to the brain.   Refraction and color tests  A refraction test is done to find the right prescription for glasses and contact lenses. A color vision test is done to check for color blindness. Color vision is often tested as part of a routine exam. You may also have this test when you apply for a job where recognizing different colors is important, such as , electronics, or the [employer]. How are vision tests done? Visual acuity test   You cover one eye at a time. You read aloud from a wall chart across the room. You read aloud from a small card that you hold in your hand. Refraction   You look into a special device. The device puts lenses of different strengths in front of each eye to see how strong your glasses or contact lenses need to be. Visual field tests   Your doctor may have you look through special machines. Or your doctor may simply have you stare straight ahead while they move a finger into and out of your field of vision. Color vision test   You look at pieces of printed test patterns in various colors. You say what number or symbol you see. Your doctor may have you trace the number or symbol using a pointer. How do these tests feel? There is very little chance of having a problem from this test. If dilating drops are used for a vision test, they may make the eyes sting and cause a medicine taste in the mouth. Follow-up care is a key part of your treatment and safety. Be sure to make and go to all appointments, and call your doctor if you are having problems. It's also a good idea to know your test results and keep a list of the medicines you take. Where can you learn more? Go to http://www.phelps.com/ and enter G551 to learn more about \"Learning About Vision Tests. \"  Current as of: October 12, 2022               Content Version: 13.5  © 3367-0926 Healthwise, [employer]. Care instructions adapted under license by [facility] ([facility]).  If you have questions about a medical condition or this instruction, always ask your healthcare professional. [place] [address] any warranty or liability for your use of this information. Advance Directives: Care Instructions  Overview  An advance directive is a legal way to state your wishes at the end of your life. It tells your family and your doctor what to do if you can't say what you want. There are two main types of advance directives. You can change them any time your wishes change. Living will. This form tells your family and your doctor your wishes about life support and other treatment. The form is also called a declaration. Medical power of . This form lets you name a person to make treatment decisions for you when you can't speak for yourself. This person is called a health care agent (health care proxy, health care surrogate). The form is also called a durable power of  for health care. If you do not have an advance directive, decisions about your medical care may be made by a family member, or by a doctor or a  who doesn't know you. It may help to think of an advance directive as a gift to the people who care for you. If you have one, they won't have to make tough decisions by themselves. For more information, including forms for your state, see the [address] website (www.caringinfo.org/planning/advance-directives/). Follow-up care is a key part of your treatment and safety. Be sure to make and go to all appointments, and call your doctor if you are having problems. It's also a good idea to know your test results and keep a list of the medicines you take. What should you include in an advance directive? Many states have a unique advance directive form. (It may ask you to address specific issues.) Or you might use a universal form that's approved by many states. If your form doesn't tell you what to address, it may be hard to know what to include in your advance directive.  Use the questions below to help you get started. Who do you want to make decisions about your medical care if you are not able to? What life-support measures do you want if you have a serious illness that gets worse over time or can't be cured? What are you most afraid of that might happen? (Maybe you're afraid of having pain, losing your independence, or being kept alive by machines.)  Where would you prefer to die? (Your home? A hospital? A nursing home?)  Do you want to donate your organs when you die? Do you want certain Jain practices performed before you die? When should you call for help? Be sure to contact your doctor if you have any questions. Where can you learn more? Go to http://www.phelps.com/ and enter R264 to learn more about \"Advance Directives: Care Instructions. \"  Current as of: June 16, 2022               Content Version: 13.5  © 4226-1179 [facility]. Care instructions adapted under license by [facility] ([facility]). If you have questions about a medical condition or this instruction, always ask your healthcare professional. [address] any warranty or liability for your use of this information. Personalized Preventive Plan for Gin Santos - 1/27/2023  Medicare offers a range of preventive health benefits. Some of the tests and screenings are paid in full while other may be subject to a deductible, co-insurance, and/or copay. Some of these benefits include a comprehensive review of your medical history including lifestyle, illnesses that may run in your family, and various assessments and screenings as appropriate. After reviewing your medical record and screening and assessments performed today your provider may have ordered immunizations, labs, imaging, and/or referrals for you. A list of these orders (if applicable) as well as your Preventive Care list are included within your After Visit Summary for your review.     Other Preventive Recommendations:    A preventive eye exam performed by an eye specialist is recommended every 1-2 years to screen for glaucoma; cataracts, macular degeneration, and other eye disorders. A preventive dental visit is recommended every 6 months. Try to get at least 150 minutes of exercise per week or 10,000 steps per day on a pedometer . Order or download the FREE \"Exercise & Physical Activity: Your Everyday Guide\" from [employer]. Call 4-567.747.3109 or search [employer] online. You need 3753-9518 mg of calcium and 0702-4020 IU of vitamin D per day. It is possible to meet your calcium requirement with diet alone, but a vitamin D supplement is usually necessary to meet this goal.  When exposed to the sun, use a sunscreen that protects against both UVA and UVB radiation with an SPF of 30 or greater. Reapply every 2 to 3 hours or after sweating, drying off with a towel, or swimming. Always wear a seat belt when traveling in a car. Always wear a helmet when riding a bicycle or motorcycle.

## 2023-02-03 DIAGNOSIS — E03.9 HYPOTHYROIDISM, UNSPECIFIED TYPE: ICD-10-CM

## 2023-02-03 DIAGNOSIS — M1A.09X0 CHRONIC GOUT OF MULTIPLE SITES, UNSPECIFIED CAUSE: ICD-10-CM

## 2023-02-03 DIAGNOSIS — E78.5 HYPERLIPIDEMIA, UNSPECIFIED HYPERLIPIDEMIA TYPE: ICD-10-CM

## 2023-02-03 DIAGNOSIS — E11.42 TYPE 2 DIABETES MELLITUS WITH DIABETIC POLYNEUROPATHY, WITHOUT LONG-TERM CURRENT USE OF INSULIN (HCC): Primary | ICD-10-CM

## 2023-02-05 ASSESSMENT — ENCOUNTER SYMPTOMS
CHEST TIGHTNESS: 0
VOMITING: 0
SHORTNESS OF BREATH: 0
RHINORRHEA: 0
EYE DISCHARGE: 0
COLOR CHANGE: 0
ABDOMINAL DISTENTION: 1
COUGH: 1
EYE PAIN: 0
DIARRHEA: 0
NAUSEA: 1
ABDOMINAL PAIN: 1
EYE REDNESS: 0
VOICE CHANGE: 0
SORE THROAT: 0
WHEEZING: 0
CONSTIPATION: 0

## 2023-03-31 DIAGNOSIS — E11.42 TYPE 2 DIABETES MELLITUS WITH DIABETIC POLYNEUROPATHY, WITHOUT LONG-TERM CURRENT USE OF INSULIN (HCC): ICD-10-CM

## 2023-03-31 RX ORDER — ATORVASTATIN CALCIUM 20 MG/1
20 TABLET, FILM COATED ORAL DAILY
Qty: 90 TABLET | Refills: 1 | Status: SHIPPED | OUTPATIENT
Start: 2023-03-31 | End: 2024-03-31

## 2023-03-31 RX ORDER — CARVEDILOL 25 MG/1
25 TABLET ORAL 2 TIMES DAILY
Qty: 180 TABLET | Refills: 1 | Status: SHIPPED | OUTPATIENT
Start: 2023-03-31 | End: 2024-03-31

## 2023-03-31 RX ORDER — METFORMIN HYDROCHLORIDE 500 MG/1
500 TABLET, EXTENDED RELEASE ORAL
Qty: 270 TABLET | Refills: 1 | Status: SHIPPED | OUTPATIENT
Start: 2023-03-31 | End: 2024-03-31

## 2023-03-31 NOTE — TELEPHONE ENCOUNTER
Panel, Fasting Once 05/03/2023   Uric Acid Once 05/03/2023       Next Visit Date:  Future Appointments   Date Time Provider Kati White   5/31/2023  1:15 PM Hoa Leo MD [facility] [address] [facility]            Patient Active Problem List:     Hypertension     Type 2 diabetes mellitus with diabetic polyneuropathy, without long-term current use of insulin ([facility] [address]     Hypothyroidism     Hyperlipidemia     Gastroesophageal reflux disease     Diabetic polyneuropathy associated with type 2 diabetes mellitus (HCC)     Chronic gout of multiple sites     Hiatal hernia     Polyp of colon     Chronic cough     Anxiety

## 2023-05-09 DIAGNOSIS — K21.9 GASTROESOPHAGEAL REFLUX DISEASE: ICD-10-CM

## 2023-05-09 RX ORDER — PANTOPRAZOLE SODIUM 40 MG/1
40 TABLET, DELAYED RELEASE ORAL DAILY
Qty: 90 TABLET | Refills: 1 | Status: SHIPPED | OUTPATIENT
Start: 2023-05-09

## 2023-05-17 ENCOUNTER — HOSPITAL ENCOUNTER (EMERGENCY)
Facility: CLINIC | Age: 68
Discharge: HOME | End: 2023-05-17
Attending: EMERGENCY MEDICINE
Payer: MEDICARE

## 2023-05-17 VITALS
OXYGEN SATURATION: 99 % | BODY MASS INDEX: 30.65 KG/M2 | HEART RATE: 64 BPM | SYSTOLIC BLOOD PRESSURE: 141 MMHG | DIASTOLIC BLOOD PRESSURE: 84 MMHG | WEIGHT: 173 LBS | TEMPERATURE: 98 F | RESPIRATION RATE: 16 BRPM

## 2023-05-17 DIAGNOSIS — N39.0 URINARY TRACT INFECTION WITHOUT HEMATURIA, SITE UNSPECIFIED: Primary | ICD-10-CM

## 2023-05-17 LAB
BACTERIA URNS QL MICRO: ABNORMAL
BILIRUB UR QL STRIP: NEGATIVE
CHARACTER UR: ABNORMAL
CLARITY UR: CLEAR
COLOR UR: YELLOW
EPI CELLS #/AREA URNS HPF: ABNORMAL /HPF (ref 0–5)
GLUCOSE UR STRIP.AUTO-MCNC: NEGATIVE MG/DL
HGB UR QL STRIP.AUTO: ABNORMAL
KETONES UR STRIP.AUTO-MCNC: NEGATIVE MG/DL
LEUKOCYTE ESTERASE UR QL STRIP: ABNORMAL
NITRITE UR QL STRIP: NEGATIVE
PROT UR STRIP-MCNC: NEGATIVE MG/DL
PROT UR STRIP.AUTO-MCNC: 6.5 MG/DL (ref 5–8)
RBC #/AREA URNS HPF: ABNORMAL /HPF (ref 0–2)
SP GR UR STRIP.AUTO: 1.01 (ref 1–1.03)
UROBILINOGEN UR STRIP-ACNC: NORMAL
WBC #/AREA URNS HPF: ABNORMAL /HPF (ref 0–5)

## 2023-05-17 PROCEDURE — 2580000003 HC RX 258: Performed by: EMERGENCY MEDICINE

## 2023-05-17 PROCEDURE — 87186 SC STD MICRODIL/AGAR DIL: CPT

## 2023-05-17 PROCEDURE — 81001 URINALYSIS AUTO W/SCOPE: CPT

## 2023-05-17 PROCEDURE — 96372 THER/PROPH/DIAG INJ SC/IM: CPT

## 2023-05-17 PROCEDURE — 87086 URINE CULTURE/COLONY COUNT: CPT

## 2023-05-17 PROCEDURE — 6360000002 HC RX W HCPCS: Performed by: EMERGENCY MEDICINE

## 2023-05-17 PROCEDURE — 99284 EMERGENCY DEPT VISIT MOD MDM: CPT

## 2023-05-17 PROCEDURE — 87088 URINE BACTERIA CULTURE: CPT

## 2023-05-17 RX ORDER — NITROFURANTOIN 25; 75 MG/1; MG/1
100 CAPSULE ORAL 2 TIMES DAILY
Qty: 10 CAPSULE | Refills: 0 | Status: SHIPPED | OUTPATIENT
Start: 2023-05-17 | End: 2023-05-22

## 2023-05-17 RX ORDER — CEFTRIAXONE 500 MG/1
500 INJECTION, POWDER, FOR SOLUTION INTRAMUSCULAR; INTRAVENOUS ONCE
Status: COMPLETED | OUTPATIENT
Start: 2023-05-17 | End: 2023-05-17

## 2023-05-17 RX ORDER — WATER 1000 ML/1000ML
10 INJECTION, SOLUTION INTRAVENOUS ONCE
Status: COMPLETED | OUTPATIENT
Start: 2023-05-17 | End: 2023-05-17

## 2023-05-17 RX ADMIN — WATER 2.1 ML: 1 INJECTION INTRAMUSCULAR; INTRAVENOUS; SUBCUTANEOUS at 11:47

## 2023-05-17 RX ADMIN — CEFTRIAXONE SODIUM 500 MG: 500 INJECTION, POWDER, FOR SOLUTION INTRAMUSCULAR; INTRAVENOUS at 11:47

## 2023-05-17 ASSESSMENT — ENCOUNTER SYMPTOMS
VOMITING: 0
DIARRHEA: 0
SHORTNESS OF BREATH: 0

## 2023-05-17 ASSESSMENT — PAIN DESCRIPTION - ORIENTATION: ORIENTATION: LOWER

## 2023-05-17 ASSESSMENT — PAIN SCALES - GENERAL: PAINLEVEL_OUTOF10: 4

## 2023-05-17 ASSESSMENT — PAIN DESCRIPTION - LOCATION: LOCATION: ABDOMEN

## 2023-05-17 ASSESSMENT — PAIN - FUNCTIONAL ASSESSMENT: PAIN_FUNCTIONAL_ASSESSMENT: 0-10

## 2023-05-17 NOTE — ED PROVIDER NOTES
[facility]  [facility]  Phone: [facility]  EMERGENCY DEPARTMENT ENCOUNTER      Pt Name: Kirt Brar  MRN: [id number]  Mikegfrolando 1955  Date of evaluation: 5/17/2023  Provider: Girish Foster DO    CHIEF COMPLAINT       Chief Complaint   Patient presents with    Dysuria     Reports dysuria/burning that started yesterday morning. Suprapubic abd pain. HISTORY OF PRESENT ILLNESS   (Location/Symptom, Timing/Onset,Context/Setting, Quality, Duration, Modifying Factors, Severity)  Note limiting factors. Kirt Brar is a 79 y.o. female who presents to the emergency department for the evaluation of difficulty urinating. Patient states that she started having difficulty urinating and burning with urination that started yesterday morning and progressed into today. She does note a little bit of pelvic cramping associated with the urine discomfort. No nausea or vomiting. No fever. She does note increased frequency as well    Nursing Notes were reviewed. REVIEW OF SYSTEMS    (2-9systems for level 4, 10 or more for level 5)     Review of Systems   Constitutional:  Negative for fever. Respiratory:  Negative for shortness of breath. Cardiovascular:  Negative for chest pain. Gastrointestinal:  Negative for diarrhea and vomiting. Genitourinary:  Positive for dysuria, frequency and pelvic pain. Skin:  Negative for rash. All other systems reviewed and are negative. Except asnoted above the remainder of the review of systems was reviewed and negative.        PAST MEDICAL HISTORY     Past Medical History:   Diagnosis Date    Abnormal weight gain     Allergic rhinitis     Anxiety     Asthma     Depression     GERD (gastroesophageal reflux disease)     Hyperlipidemia     Hypertension     Hypothyroidism     Postmenopause bleeding     Tietze's disease     Type II or unspecified type diabetes mellitus without mention of

## 2023-05-18 LAB
MICROORGANISM SPEC CULT: ABNORMAL
SPECIMEN DESCRIPTION: ABNORMAL

## 2023-05-24 ENCOUNTER — HOSPITAL ENCOUNTER (OUTPATIENT)
Age: 68
Setting detail: SPECIMEN
Discharge: HOME | End: 2023-05-24

## 2023-05-24 DIAGNOSIS — E11.42 TYPE 2 DIABETES MELLITUS WITH DIABETIC POLYNEUROPATHY, WITHOUT LONG-TERM CURRENT USE OF INSULIN (HCC): ICD-10-CM

## 2023-05-24 DIAGNOSIS — M1A.09X0 CHRONIC GOUT OF MULTIPLE SITES, UNSPECIFIED CAUSE: ICD-10-CM

## 2023-05-24 LAB
EST. AVERAGE GLUCOSE BLD GHB EST-MCNC: 146 MG/DL
HBA1C MFR BLD: 6.7 % (ref 4–6)
URATE SERPL-MCNC: 7.5 MG/DL (ref 2.4–5.7)

## 2023-05-31 ENCOUNTER — HOSPITAL ENCOUNTER (OUTPATIENT)
Age: 68
Setting detail: SPECIMEN
Discharge: HOME | End: 2023-05-31

## 2023-05-31 ENCOUNTER — OFFICE VISIT (OUTPATIENT)
Dept: FAMILY MEDICINE CLINIC | Age: 68
End: 2023-05-31

## 2023-05-31 VITALS
BODY MASS INDEX: 31.04 KG/M2 | RESPIRATION RATE: 14 BRPM | TEMPERATURE: 97.7 F | SYSTOLIC BLOOD PRESSURE: 124 MMHG | DIASTOLIC BLOOD PRESSURE: 80 MMHG | HEART RATE: 66 BPM | WEIGHT: 175.2 LBS

## 2023-05-31 DIAGNOSIS — F41.9 ANXIETY: ICD-10-CM

## 2023-05-31 DIAGNOSIS — E03.9 HYPOTHYROIDISM, UNSPECIFIED TYPE: ICD-10-CM

## 2023-05-31 DIAGNOSIS — K44.9 HIATAL HERNIA: ICD-10-CM

## 2023-05-31 DIAGNOSIS — E78.5 HYPERLIPIDEMIA, UNSPECIFIED HYPERLIPIDEMIA TYPE: ICD-10-CM

## 2023-05-31 DIAGNOSIS — M25.551 RIGHT HIP PAIN: ICD-10-CM

## 2023-05-31 DIAGNOSIS — R30.0 DYSURIA: ICD-10-CM

## 2023-05-31 DIAGNOSIS — M1A.09X0 CHRONIC GOUT OF MULTIPLE SITES, UNSPECIFIED CAUSE: ICD-10-CM

## 2023-05-31 DIAGNOSIS — E11.42 TYPE 2 DIABETES MELLITUS WITH DIABETIC POLYNEUROPATHY, WITHOUT LONG-TERM CURRENT USE OF INSULIN (HCC): Primary | ICD-10-CM

## 2023-05-31 DIAGNOSIS — K63.5 POLYP OF COLON, UNSPECIFIED PART OF COLON, UNSPECIFIED TYPE: ICD-10-CM

## 2023-05-31 DIAGNOSIS — M16.11 PRIMARY OSTEOARTHRITIS OF RIGHT HIP: ICD-10-CM

## 2023-05-31 DIAGNOSIS — R05.3 CHRONIC COUGH: ICD-10-CM

## 2023-05-31 DIAGNOSIS — E11.42 DIABETIC POLYNEUROPATHY ASSOCIATED WITH TYPE 2 DIABETES MELLITUS (HCC): ICD-10-CM

## 2023-05-31 DIAGNOSIS — K21.9 GASTROESOPHAGEAL REFLUX DISEASE, UNSPECIFIED WHETHER ESOPHAGITIS PRESENT: ICD-10-CM

## 2023-05-31 DIAGNOSIS — N39.0 URINARY TRACT INFECTION WITHOUT HEMATURIA, SITE UNSPECIFIED: ICD-10-CM

## 2023-05-31 DIAGNOSIS — I10 ESSENTIAL HYPERTENSION: ICD-10-CM

## 2023-05-31 LAB
BACTERIA URNS QL MICRO: ABNORMAL
BILIRUB UR QL STRIP: NEGATIVE
CASTS #/AREA URNS LPF: ABNORMAL /LPF (ref 0–8)
CLARITY UR: ABNORMAL
COLOR UR: YELLOW
EPI CELLS #/AREA URNS HPF: ABNORMAL /HPF (ref 0–5)
GLUCOSE UR STRIP-MCNC: NEGATIVE MG/DL
HGB UR QL STRIP.AUTO: ABNORMAL
KETONES UR STRIP-MCNC: NEGATIVE MG/DL
LEUKOCYTE ESTERASE UR QL STRIP: ABNORMAL
NITRITE UR QL STRIP: NEGATIVE
PH UR STRIP: 7 [PH] (ref 5–8)
PROT UR STRIP-MCNC: NEGATIVE MG/DL
RBC #/AREA URNS HPF: ABNORMAL /HPF (ref 0–4)
SP GR UR STRIP: 1.01 (ref 1–1.03)
UROBILINOGEN UR STRIP-ACNC: NORMAL
WBC #/AREA URNS HPF: ABNORMAL /HPF (ref 0–5)

## 2023-05-31 RX ORDER — LEVOFLOXACIN 250 MG/1
250 TABLET ORAL DAILY
Qty: 5 TABLET | Refills: 0 | Status: SHIPPED | OUTPATIENT
Start: 2023-05-31 | End: 2023-06-05

## 2023-05-31 SDOH — ECONOMIC STABILITY: INCOME INSECURITY: HOW HARD IS IT FOR YOU TO PAY FOR THE VERY BASICS LIKE FOOD, HOUSING, MEDICAL CARE, AND HEATING?: NOT HARD AT ALL

## 2023-05-31 SDOH — ECONOMIC STABILITY: FOOD INSECURITY: WITHIN THE PAST 12 MONTHS, THE FOOD YOU BOUGHT JUST DIDN'T LAST AND YOU DIDN'T HAVE MONEY TO GET MORE.: NEVER TRUE

## 2023-05-31 SDOH — ECONOMIC STABILITY: HOUSING INSECURITY
IN THE LAST 12 MONTHS, WAS THERE A TIME WHEN YOU DID NOT HAVE A STEADY PLACE TO SLEEP OR SLEPT IN A SHELTER (INCLUDING NOW)?: NO

## 2023-05-31 SDOH — ECONOMIC STABILITY: FOOD INSECURITY: WITHIN THE PAST 12 MONTHS, YOU WORRIED THAT YOUR FOOD WOULD RUN OUT BEFORE YOU GOT MONEY TO BUY MORE.: NEVER TRUE

## 2023-05-31 ASSESSMENT — ENCOUNTER SYMPTOMS
SORE THROAT: 0
DIARRHEA: 0
RHINORRHEA: 0
WHEEZING: 0
EYE DISCHARGE: 0
CONSTIPATION: 0
VOICE CHANGE: 0
EYE REDNESS: 0
NAUSEA: 1
COUGH: 1
ABDOMINAL DISTENTION: 1
VOMITING: 0
CHEST TIGHTNESS: 0
ABDOMINAL PAIN: 1
SHORTNESS OF BREATH: 0
EYE PAIN: 0
COLOR CHANGE: 0

## 2023-06-02 LAB
MICROORGANISM SPEC CULT: ABNORMAL
SPECIMEN DESCRIPTION: ABNORMAL

## 2023-06-02 RX ORDER — CEPHALEXIN 500 MG/1
500 CAPSULE ORAL 3 TIMES DAILY
Qty: 30 CAPSULE | Refills: 0 | Status: SHIPPED | OUTPATIENT
Start: 2023-06-02 | End: 2023-06-12

## 2023-06-02 NOTE — PROGRESS NOTES
Patient called and stated that she and provider discussed taking  a different medication rather than levofloxacin. Writer does not see anything in office visit note saying other wise, and see that this medication was ordered at visit. Please advise the recommendation,as patient is worried due to up coming weekend.

## 2023-07-05 RX ORDER — LEVOTHYROXINE SODIUM 137 UG/1
137 TABLET ORAL DAILY
Qty: 90 TABLET | Refills: 1 | Status: SHIPPED | OUTPATIENT
Start: 2023-07-05

## 2023-07-05 NOTE — TELEPHONE ENCOUNTER
LOV 5/31/2023     RTO 8/30/2023  LRF 1/2/2023          Controlled Substance Monitoring:    Acute and Chronic Pain Monitoring:   RX Monitoring 1/5/2022   Attestation -   Periodic Controlled Substance Monitoring No signs of potential drug abuse or diversion identified. ;Random urine drug screen sent today.

## 2023-07-19 DIAGNOSIS — M1A.09X0 CHRONIC GOUT OF MULTIPLE SITES, UNSPECIFIED CAUSE: ICD-10-CM

## 2023-07-21 RX ORDER — COLCHICINE 0.6 MG/1
0.6 TABLET ORAL EVERY MORNING
Qty: 30 TABLET | Refills: 5 | Status: SHIPPED | OUTPATIENT
Start: 2023-07-21

## 2023-07-21 NOTE — TELEPHONE ENCOUNTER
LOV 5/31/2023     RTO 8/30/2023  LRF 1/25/2023          Controlled Substance Monitoring:    Acute and Chronic Pain Monitoring:   RX Monitoring 1/5/2022   Attestation -   Periodic Controlled Substance Monitoring No signs of potential drug abuse or diversion identified. ;Random urine drug screen sent today.

## 2023-08-30 ENCOUNTER — HOSPITAL ENCOUNTER (OUTPATIENT)
Age: 68
Setting detail: SPECIMEN
Discharge: HOME | End: 2023-08-30

## 2023-08-30 ENCOUNTER — OFFICE VISIT (OUTPATIENT)
Dept: FAMILY MEDICINE CLINIC | Age: 68
End: 2023-08-30

## 2023-08-30 VITALS
TEMPERATURE: 97.2 F | RESPIRATION RATE: 14 BRPM | WEIGHT: 169.2 LBS | BODY MASS INDEX: 29.97 KG/M2 | HEART RATE: 56 BPM | DIASTOLIC BLOOD PRESSURE: 78 MMHG | SYSTOLIC BLOOD PRESSURE: 114 MMHG

## 2023-08-30 DIAGNOSIS — Z12.31 SCREENING MAMMOGRAM FOR BREAST CANCER: ICD-10-CM

## 2023-08-30 DIAGNOSIS — E78.5 HYPERLIPIDEMIA, UNSPECIFIED HYPERLIPIDEMIA TYPE: ICD-10-CM

## 2023-08-30 DIAGNOSIS — I10 ESSENTIAL HYPERTENSION: ICD-10-CM

## 2023-08-30 DIAGNOSIS — E11.42 TYPE 2 DIABETES MELLITUS WITH DIABETIC POLYNEUROPATHY, WITHOUT LONG-TERM CURRENT USE OF INSULIN (HCC): Primary | ICD-10-CM

## 2023-08-30 DIAGNOSIS — K44.9 HIATAL HERNIA: ICD-10-CM

## 2023-08-30 DIAGNOSIS — R05.3 CHRONIC COUGH: ICD-10-CM

## 2023-08-30 DIAGNOSIS — R10.84 GENERALIZED ABDOMINAL PAIN: ICD-10-CM

## 2023-08-30 DIAGNOSIS — K21.9 GASTROESOPHAGEAL REFLUX DISEASE, UNSPECIFIED WHETHER ESOPHAGITIS PRESENT: ICD-10-CM

## 2023-08-30 DIAGNOSIS — M1A.09X0 CHRONIC GOUT OF MULTIPLE SITES, UNSPECIFIED CAUSE: ICD-10-CM

## 2023-08-30 DIAGNOSIS — R30.0 DYSURIA: ICD-10-CM

## 2023-08-30 DIAGNOSIS — E11.42 DIABETIC POLYNEUROPATHY ASSOCIATED WITH TYPE 2 DIABETES MELLITUS (HCC): ICD-10-CM

## 2023-08-30 DIAGNOSIS — Z51.81 THERAPEUTIC DRUG MONITORING: ICD-10-CM

## 2023-08-30 DIAGNOSIS — E03.9 HYPOTHYROIDISM, UNSPECIFIED TYPE: ICD-10-CM

## 2023-08-30 DIAGNOSIS — E11.42 TYPE 2 DIABETES MELLITUS WITH DIABETIC POLYNEUROPATHY, WITHOUT LONG-TERM CURRENT USE OF INSULIN (HCC): ICD-10-CM

## 2023-08-30 DIAGNOSIS — F41.9 ANXIETY: ICD-10-CM

## 2023-08-30 DIAGNOSIS — G47.9 SLEEP DISTURBANCE: ICD-10-CM

## 2023-08-30 DIAGNOSIS — Z87.440 HISTORY OF UTI: ICD-10-CM

## 2023-08-30 DIAGNOSIS — M16.11 PRIMARY OSTEOARTHRITIS OF RIGHT HIP: ICD-10-CM

## 2023-08-30 DIAGNOSIS — K63.5 POLYP OF COLON, UNSPECIFIED PART OF COLON, UNSPECIFIED TYPE: ICD-10-CM

## 2023-08-30 LAB
ALBUMIN SERPL-MCNC: 4.7 G/DL (ref 3.5–5.2)
ALBUMIN/GLOB SERPL: 1.9 {RATIO} (ref 1–2.5)
ALCOHOL URINE: NEGATIVE
ALP SERPL-CCNC: 64 U/L (ref 35–104)
ALT SERPL-CCNC: 57 U/L (ref 5–33)
AMPHETAMINE SCREEN, URINE: NEGATIVE
AMYLASE SERPL-CCNC: 59 U/L (ref 28–100)
ANION GAP SERPL CALCULATED.3IONS-SCNC: 15 MMOL/L (ref 9–17)
AST SERPL-CCNC: 71 U/L
BARBITURATE SCREEN, URINE: NEGATIVE
BENZODIAZEPINE SCREEN, URINE: NEGATIVE
BILIRUB SERPL-MCNC: 0.6 MG/DL (ref 0.3–1.2)
BILIRUB UR QL STRIP: NEGATIVE
BUN SERPL-MCNC: 14 MG/DL (ref 8–23)
BUPRENORPHINE URINE: NEGATIVE
CALCIUM SERPL-MCNC: 10.4 MG/DL (ref 8.6–10.4)
CHLORIDE SERPL-SCNC: 100 MMOL/L (ref 98–107)
CHOLEST SERPL-MCNC: 128 MG/DL
CHOLESTEROL/HDL RATIO: 3.6
CLARITY UR: CLEAR
CO2 SERPL-SCNC: 27 MMOL/L (ref 20–31)
COCAINE METABOLITE SCREEN URINE: NEGATIVE
COLOR UR: YELLOW
COMMENT: NORMAL
CREAT SERPL-MCNC: 1 MG/DL (ref 0.5–0.9)
EST. AVERAGE GLUCOSE BLD GHB EST-MCNC: 154 MG/DL
FENTANYL SCREEN, URINE: NEGATIVE
GABAPENTIN SCREEN, URINE: NEGATIVE
GFR SERPL CREATININE-BSD FRML MDRD: >60 ML/MIN/1.73M2
GLUCOSE SERPL-MCNC: 109 MG/DL (ref 70–99)
GLUCOSE UR STRIP-MCNC: NEGATIVE MG/DL
HBA1C MFR BLD: 7 % (ref 4–6)
HDLC SERPL-MCNC: 36 MG/DL
HGB UR QL STRIP.AUTO: NEGATIVE
KETONES UR STRIP-MCNC: NEGATIVE MG/DL
LDLC SERPL CALC-MCNC: 31 MG/DL (ref 0–130)
LEUKOCYTE ESTERASE UR QL STRIP: NEGATIVE
LIPASE SERPL-CCNC: 39 U/L (ref 13–60)
MDMA URINE: NEGATIVE
METHADONE SCREEN, URINE: NEGATIVE
METHAMPHETAMINE, URINE: NEGATIVE
NITRITE UR QL STRIP: NEGATIVE
OPIATE SCREEN URINE: NEGATIVE
OXYCODONE SCREEN URINE: NEGATIVE
PH UR STRIP: 7.5 [PH] (ref 5–8)
PHENCYCLIDINE SCREEN URINE: NEGATIVE
POTASSIUM SERPL-SCNC: 3.7 MMOL/L (ref 3.7–5.3)
PROPOXYPHENE SCREEN, URINE: NEGATIVE
PROT SERPL-MCNC: 7.2 G/DL (ref 6.4–8.3)
PROT UR STRIP-MCNC: NEGATIVE MG/DL
SODIUM SERPL-SCNC: 142 MMOL/L (ref 135–144)
SP GR UR STRIP: 1.01 (ref 1–1.03)
SYNTHETIC CANNABINOIDS(K2) SCREEN, URINE: NEGATIVE
THC SCREEN, URINE: NEGATIVE
TRAMADOL SCREEN URINE: NEGATIVE
TRICYCLIC ANTIDEPRESSANTS, UR: NEGATIVE
TRIGL SERPL-MCNC: 303 MG/DL
TSH SERPL DL<=0.05 MIU/L-ACNC: 0.47 UIU/ML (ref 0.3–5)
URATE SERPL-MCNC: 7.9 MG/DL (ref 2.4–5.7)
UROBILINOGEN UR STRIP-ACNC: NORMAL EU/DL (ref 0–1)

## 2023-08-30 RX ORDER — LORAZEPAM 0.5 MG/1
0.5 TABLET ORAL EVERY 8 HOURS PRN
Qty: 30 TABLET | Refills: 0 | Status: SHIPPED | OUTPATIENT
Start: 2023-08-30 | End: 2023-09-29

## 2023-08-30 ASSESSMENT — ENCOUNTER SYMPTOMS
ABDOMINAL PAIN: 1
EYE REDNESS: 0
EYE PAIN: 0
SHORTNESS OF BREATH: 0
CONSTIPATION: 0
SORE THROAT: 0
NAUSEA: 1
DIARRHEA: 0
ABDOMINAL DISTENTION: 1
VOMITING: 0
COUGH: 1
VOICE CHANGE: 0
EYE DISCHARGE: 0
RHINORRHEA: 0
COLOR CHANGE: 0
WHEEZING: 0

## 2023-08-30 NOTE — PROGRESS NOTES
Gerry Esparza (:  1955) is a 79 y.o. female,Established patient, here for evaluation of the following chief complaint(s):    Diabetes, Cholesterol Problem, and Hypertension       ASSESSMENT/PLAN:    1. Type 2 diabetes mellitus with diabetic polyneuropathy, without long-term current use of insulin ([address])  2. Diabetic polyneuropathy associated with type 2 diabetes mellitus ([address])  3. Essential hypertension  4. Hyperlipidemia, unspecified hyperlipidemia type  5. Hypothyroidism, unspecified type  6. Gastroesophageal reflux disease, unspecified whether esophagitis present  7. Hiatal hernia  8. Chronic cough  9. Polyp of colon, unspecified part of colon, unspecified type  10. Chronic gout of multiple sites, unspecified cause  11. Anxiety  -     LORazepam (ATIVAN) 0.5 MG tablet; Take 1 tablet by mouth every 8 hours as needed for Anxiety for up to 30 days. , Disp-30 tablet, R-0Normal  12. Therapeutic drug monitoring  -     POCT Rapid Drug Screen  13. Primary osteoarthritis of right hip  14. Generalized abdominal pain  -     Amylase; Future  -     Lipase; Future  15. History of UTI  -     Urinalysis; Future  -     Culture, Urine; Future  16. Dysuria  -     Urinalysis; Future  -     Culture, Urine; Future  17. Sleep disturbance  18. Screening mammogram for breast cancer  -     [facility] DIGITAL SCREEN BILATERAL;  Future      Obtan labs as ordered   Continue same medications  Strict diabetic diet and regular exercise encouraged   Continue weight reduction recommended   Discontinue metformin for at least a month   Monitor blood sugars closely   Consider starting mounjaro if blood sugars elevate  Yearly eye and foot exams recommended   Monitor blood pressure regularly   Follow up with cardiology as scheduled   Continue lipitor   Follow up with GI  Obtain amylase / lipase in addition to routine labs   Colonoscopy due in   Start allopurinol   May stop colchicine after starting allopurinol  Follow up with

## 2023-08-31 LAB
MICROORGANISM SPEC CULT: NO GROWTH
SPECIMEN DESCRIPTION: NORMAL

## 2023-09-03 ASSESSMENT — ENCOUNTER SYMPTOMS: CHEST TIGHTNESS: 1

## 2023-09-13 DIAGNOSIS — E11.42 DIABETIC POLYNEUROPATHY ASSOCIATED WITH TYPE 2 DIABETES MELLITUS (HCC): ICD-10-CM

## 2023-09-13 DIAGNOSIS — E11.42 TYPE 2 DIABETES MELLITUS WITH DIABETIC POLYNEUROPATHY, WITHOUT LONG-TERM CURRENT USE OF INSULIN (HCC): Primary | ICD-10-CM

## 2023-09-13 DIAGNOSIS — R79.89 ELEVATED LIVER FUNCTION TESTS: ICD-10-CM

## 2023-09-13 DIAGNOSIS — I10 ESSENTIAL HYPERTENSION: ICD-10-CM

## 2023-09-13 DIAGNOSIS — E78.5 HYPERLIPIDEMIA, UNSPECIFIED HYPERLIPIDEMIA TYPE: ICD-10-CM

## 2023-09-13 RX ORDER — TIRZEPATIDE 2.5 MG/.5ML
2.5 INJECTION, SOLUTION SUBCUTANEOUS WEEKLY
Qty: 2 ML | Refills: 1 | Status: SHIPPED | OUTPATIENT
Start: 2023-09-13 | End: 2023-10-20

## 2023-09-13 NOTE — PROGRESS NOTES
Please let patient know the orders were sent to me today and are signed. There is a previous message that she wanted to know when the orders were ready. Close encounter when complete.

## 2023-09-18 DIAGNOSIS — E11.42 TYPE 2 DIABETES MELLITUS WITH DIABETIC POLYNEUROPATHY, WITHOUT LONG-TERM CURRENT USE OF INSULIN (HCC): Primary | ICD-10-CM

## 2023-09-27 ENCOUNTER — TELEPHONE (OUTPATIENT)
Dept: FAMILY MEDICINE CLINIC | Age: 68
End: 2023-09-27

## 2023-09-27 DIAGNOSIS — E11.42 TYPE 2 DIABETES MELLITUS WITH DIABETIC POLYNEUROPATHY, WITHOUT LONG-TERM CURRENT USE OF INSULIN (HCC): Primary | ICD-10-CM

## 2023-09-27 NOTE — TELEPHONE ENCOUNTER
----- Message from Geo Phelps sent at 9/26/2023  3:50 PM EDT -----  Subject: Medication Problem    Medication: Semaglutide,0.25 or 0.5MG/DOS, 2 MG/1.5ML SOPN  Dosage: n/a  Ordering Provider: Saravanan Agudelo    Question/Problem: The patient would like a call back due to the pharmacy   states that the [address] [place] [address] is not covered and will cost almost 500.00 a   month.  If so she would like to stay on the metformin due to the cost. She   can be reached at [phone] ok to leave a message      Pharmacy: [facility] [address] [facility] [address] [facility] [phone]    ---------------------------------------------------------------------------  --------------  Usman Solares DIANE  [phone]; OK to leave message on voicemail  ---------------------------------------------------------------------------  --------------    SCRIPT ANSWERS  Relationship to Patient: Self

## 2023-09-27 NOTE — TELEPHONE ENCOUNTER
Is there an alternative that is covered in place of the ozempic? Did her GI symptoms get better when she was off the metformin?

## 2023-09-28 DIAGNOSIS — E11.42 TYPE 2 DIABETES MELLITUS WITH DIABETIC POLYNEUROPATHY, WITHOUT LONG-TERM CURRENT USE OF INSULIN (HCC): ICD-10-CM

## 2023-09-28 NOTE — TELEPHONE ENCOUNTER
LOV: 8/30/2023    RTO:   Future Appointments   Date Time Provider [address]   12/7/2023 12:30 PM MD Juan Miguel Jasmine     LRF: 3/31/23          Controlled Substance Monitoring:    Acute and Chronic Pain Monitoring:   RX Monitoring Periodic Controlled Substance Monitoring   8/30/2023  11:17 AM No signs of potential drug abuse or diversion identified. ;Random urine drug screen sent today.

## 2023-09-29 RX ORDER — PIOGLITAZONEHYDROCHLORIDE 15 MG/1
15 TABLET ORAL DAILY
Qty: 30 TABLET | Refills: 3 | Status: SHIPPED | OUTPATIENT
Start: 2023-09-29

## 2023-09-29 RX ORDER — ATORVASTATIN CALCIUM 20 MG/1
20 TABLET, FILM COATED ORAL DAILY
Qty: 90 TABLET | Refills: 1 | Status: SHIPPED | OUTPATIENT
Start: 2023-09-29

## 2023-09-29 RX ORDER — CARVEDILOL 25 MG/1
25 TABLET ORAL 2 TIMES DAILY
Qty: 180 TABLET | Refills: 1 | Status: SHIPPED | OUTPATIENT
Start: 2023-09-29

## 2023-09-29 RX ORDER — METFORMIN HYDROCHLORIDE 500 MG/1
500 TABLET, EXTENDED RELEASE ORAL
Qty: 270 TABLET | Refills: 1 | OUTPATIENT
Start: 2023-09-29

## 2023-09-29 NOTE — TELEPHONE ENCOUNTER
I would like her to try actose 15mg once daily in the morning. This should not cause upset stomach like the metformin. Sent to [employer].       [employer] message sent to patient

## 2023-09-29 NOTE — TELEPHONE ENCOUNTER
Eevrton Donovan is suppose to be a covered alternative per the denial letter scanned into the patient's media. Patient is still on Metformin but is taking once daily which she just started about 2 days ago and the GI symptoms have been ok.

## 2023-09-29 NOTE — TELEPHONE ENCOUNTER
I don't know why the ozempic isn't covered if the letter states it is or maybe she is in the donut hole - I don't know. I am just trying to help improve her stomach symptoms and keep her sugars under control. We can try a different oral medication in place of the metformin if she would like.

## 2023-11-05 DIAGNOSIS — K21.9 GASTROESOPHAGEAL REFLUX DISEASE: ICD-10-CM

## 2023-11-06 NOTE — TELEPHONE ENCOUNTER
LOV 8-30-23   RTO 12-7-23  LRF 5-9-23          Controlled Substance Monitoring:    Acute and Chronic Pain Monitoring:   RX Monitoring Periodic Controlled Substance Monitoring   8/30/2023  11:17 AM No signs of potential drug abuse or diversion identified. ;Random urine drug screen sent today.

## 2023-11-07 ENCOUNTER — PATIENT MESSAGE (OUTPATIENT)
Dept: FAMILY MEDICINE CLINIC | Age: 68
End: 2023-11-07

## 2023-11-07 DIAGNOSIS — E11.42 TYPE 2 DIABETES MELLITUS WITH DIABETIC POLYNEUROPATHY, WITHOUT LONG-TERM CURRENT USE OF INSULIN (HCC): ICD-10-CM

## 2023-11-07 RX ORDER — PANTOPRAZOLE SODIUM 40 MG/1
40 TABLET, DELAYED RELEASE ORAL DAILY
Qty: 90 TABLET | Refills: 1 | Status: SHIPPED | OUTPATIENT
Start: 2023-11-07 | End: 2024-11-07

## 2023-11-08 RX ORDER — METFORMIN HYDROCHLORIDE 500 MG/1
500 TABLET, EXTENDED RELEASE ORAL
Qty: 270 TABLET | Refills: 1 | Status: SHIPPED | OUTPATIENT
Start: 2023-11-08 | End: 2024-11-08

## 2023-12-01 ENCOUNTER — HOSPITAL ENCOUNTER (OUTPATIENT)
Dept: MAMMOGRAPHY | Age: 68
End: 2023-12-01
Payer: MEDICARE

## 2023-12-01 ENCOUNTER — HOSPITAL ENCOUNTER (OUTPATIENT)
Dept: ULTRASOUND IMAGING | Age: 68
End: 2023-12-01
Payer: MEDICARE

## 2023-12-01 ENCOUNTER — HOSPITAL ENCOUNTER (OUTPATIENT)
Age: 68
Discharge: HOME | End: 2023-12-01
Payer: MEDICARE

## 2023-12-01 DIAGNOSIS — I10 ESSENTIAL HYPERTENSION: ICD-10-CM

## 2023-12-01 DIAGNOSIS — Z13.820 SCREENING FOR OSTEOPOROSIS: ICD-10-CM

## 2023-12-01 DIAGNOSIS — E78.5 HYPERLIPIDEMIA, UNSPECIFIED HYPERLIPIDEMIA TYPE: ICD-10-CM

## 2023-12-01 DIAGNOSIS — Z78.0 POSTMENOPAUSAL: ICD-10-CM

## 2023-12-01 DIAGNOSIS — R79.89 ELEVATED LIVER FUNCTION TESTS: ICD-10-CM

## 2023-12-01 DIAGNOSIS — Z12.31 SCREENING MAMMOGRAM FOR BREAST CANCER: ICD-10-CM

## 2023-12-01 DIAGNOSIS — E11.42 TYPE 2 DIABETES MELLITUS WITH DIABETIC POLYNEUROPATHY, WITHOUT LONG-TERM CURRENT USE OF INSULIN (HCC): ICD-10-CM

## 2023-12-01 LAB
ALBUMIN SERPL-MCNC: 4.2 G/DL (ref 3.5–5.2)
ALBUMIN/GLOB SERPL: 2 {RATIO} (ref 1–2.5)
ALP SERPL-CCNC: 63 U/L (ref 35–104)
ALT SERPL-CCNC: 49 U/L (ref 10–35)
ANION GAP SERPL CALCULATED.3IONS-SCNC: 11 MMOL/L (ref 9–16)
AST SERPL-CCNC: 49 U/L (ref 10–35)
BILIRUB SERPL-MCNC: 0.4 MG/DL (ref 0–1.2)
BUN SERPL-MCNC: 15 MG/DL (ref 8–23)
CALCIUM SERPL-MCNC: 10.2 MG/DL (ref 8.6–10.4)
CHLORIDE SERPL-SCNC: 100 MMOL/L (ref 98–107)
CHOLEST SERPL-MCNC: 147 MG/DL (ref 0–199)
CHOLESTEROL/HDL RATIO: 4
CO2 SERPL-SCNC: 28 MMOL/L (ref 20–31)
CREAT SERPL-MCNC: 1.1 MG/DL (ref 0.5–0.9)
EST. AVERAGE GLUCOSE BLD GHB EST-MCNC: 151 MG/DL
GFR SERPL CREATININE-BSD FRML MDRD: 56 ML/MIN/1.73M2
GLUCOSE SERPL-MCNC: 122 MG/DL (ref 74–99)
HBA1C MFR BLD: 6.9 % (ref 4–6)
HDLC SERPL-MCNC: 38 MG/DL
LDLC SERPL CALC-MCNC: 55 MG/DL (ref 0–100)
POTASSIUM SERPL-SCNC: 4 MMOL/L (ref 3.7–5.3)
PROT SERPL-MCNC: 6.9 G/DL (ref 6.6–8.7)
SODIUM SERPL-SCNC: 139 MMOL/L (ref 136–145)
TRIGL SERPL-MCNC: 268 MG/DL (ref 0–149)
VLDLC SERPL CALC-MCNC: 54 MG/DL

## 2023-12-01 PROCEDURE — 76705 ECHO EXAM OF ABDOMEN: CPT

## 2023-12-01 PROCEDURE — 80061 LIPID PANEL: CPT

## 2023-12-01 PROCEDURE — 83036 HEMOGLOBIN GLYCOSYLATED A1C: CPT

## 2023-12-01 PROCEDURE — 36415 COLL VENOUS BLD VENIPUNCTURE: CPT

## 2023-12-01 PROCEDURE — 77063 BREAST TOMOSYNTHESIS BI: CPT

## 2023-12-01 PROCEDURE — 77080 DXA BONE DENSITY AXIAL: CPT

## 2023-12-01 PROCEDURE — 80053 COMPREHEN METABOLIC PANEL: CPT

## 2023-12-11 ENCOUNTER — OFFICE VISIT (OUTPATIENT)
Dept: FAMILY MEDICINE CLINIC | Age: 68
End: 2023-12-11
Payer: MEDICARE

## 2023-12-11 VITALS
SYSTOLIC BLOOD PRESSURE: 126 MMHG | HEART RATE: 58 BPM | DIASTOLIC BLOOD PRESSURE: 78 MMHG | WEIGHT: 174 LBS | BODY MASS INDEX: 30.82 KG/M2 | TEMPERATURE: 97.8 F

## 2023-12-11 DIAGNOSIS — K63.5 POLYP OF COLON, UNSPECIFIED PART OF COLON, UNSPECIFIED TYPE: ICD-10-CM

## 2023-12-11 DIAGNOSIS — M1A.09X0 CHRONIC GOUT OF MULTIPLE SITES, UNSPECIFIED CAUSE: ICD-10-CM

## 2023-12-11 DIAGNOSIS — R92.8 ABNORMAL SCREENING MAMMOGRAM: ICD-10-CM

## 2023-12-11 DIAGNOSIS — K21.9 GASTROESOPHAGEAL REFLUX DISEASE, UNSPECIFIED WHETHER ESOPHAGITIS PRESENT: ICD-10-CM

## 2023-12-11 DIAGNOSIS — E11.42 DIABETIC POLYNEUROPATHY ASSOCIATED WITH TYPE 2 DIABETES MELLITUS (HCC): ICD-10-CM

## 2023-12-11 DIAGNOSIS — E78.5 HYPERLIPIDEMIA, UNSPECIFIED HYPERLIPIDEMIA TYPE: ICD-10-CM

## 2023-12-11 DIAGNOSIS — F41.9 ANXIETY: ICD-10-CM

## 2023-12-11 DIAGNOSIS — N18.31 STAGE 3A CHRONIC KIDNEY DISEASE (HCC): ICD-10-CM

## 2023-12-11 DIAGNOSIS — I10 ESSENTIAL HYPERTENSION: ICD-10-CM

## 2023-12-11 DIAGNOSIS — R05.3 CHRONIC COUGH: ICD-10-CM

## 2023-12-11 DIAGNOSIS — R10.84 GENERALIZED ABDOMINAL PAIN: ICD-10-CM

## 2023-12-11 DIAGNOSIS — E11.42 TYPE 2 DIABETES MELLITUS WITH DIABETIC POLYNEUROPATHY, WITHOUT LONG-TERM CURRENT USE OF INSULIN (HCC): Primary | ICD-10-CM

## 2023-12-11 DIAGNOSIS — E03.9 HYPOTHYROIDISM, UNSPECIFIED TYPE: ICD-10-CM

## 2023-12-11 DIAGNOSIS — M16.11 PRIMARY OSTEOARTHRITIS OF RIGHT HIP: ICD-10-CM

## 2023-12-11 PROCEDURE — 3074F SYST BP LT 130 MM HG: CPT | Performed by: PEDIATRICS

## 2023-12-11 PROCEDURE — 99214 OFFICE O/P EST MOD 30 MIN: CPT | Performed by: PEDIATRICS

## 2023-12-11 PROCEDURE — G8399 PT W/DXA RESULTS DOCUMENT: HCPCS | Performed by: PEDIATRICS

## 2023-12-11 PROCEDURE — G8427 DOCREV CUR MEDS BY ELIG CLIN: HCPCS | Performed by: PEDIATRICS

## 2023-12-11 PROCEDURE — 1123F ACP DISCUSS/DSCN MKR DOCD: CPT | Performed by: PEDIATRICS

## 2023-12-11 PROCEDURE — 3017F COLORECTAL CA SCREEN DOC REV: CPT | Performed by: PEDIATRICS

## 2023-12-11 PROCEDURE — G8417 CALC BMI ABV UP PARAM F/U: HCPCS | Performed by: PEDIATRICS

## 2023-12-11 PROCEDURE — 3044F HG A1C LEVEL LT 7.0%: CPT | Performed by: PEDIATRICS

## 2023-12-11 PROCEDURE — 3078F DIAST BP <80 MM HG: CPT | Performed by: PEDIATRICS

## 2023-12-11 PROCEDURE — 1090F PRES/ABSN URINE INCON ASSESS: CPT | Performed by: PEDIATRICS

## 2023-12-11 PROCEDURE — 1036F TOBACCO NON-USER: CPT | Performed by: PEDIATRICS

## 2023-12-11 PROCEDURE — 2022F DILAT RTA XM EVC RTNOPTHY: CPT | Performed by: PEDIATRICS

## 2023-12-11 PROCEDURE — G8484 FLU IMMUNIZE NO ADMIN: HCPCS | Performed by: PEDIATRICS

## 2023-12-11 NOTE — PROGRESS NOTES
Cervical back: Normal range of motion and neck supple. Right lower leg: No edema. Left lower leg: No edema. Lymphadenopathy:      Cervical: No cervical adenopathy. Skin:     General: Skin is warm. Capillary Refill: Capillary refill takes less than 2 seconds. Coloration: Skin is not pale. Findings: No erythema or rash. Neurological:      General: No focal deficit present. Mental Status: She is alert and oriented to person, place, and time. Cranial Nerves: No cranial nerve deficit. Motor: No weakness or abnormal muscle tone. Coordination: Coordination normal.      Gait: Gait normal.      Deep Tendon Reflexes: Reflexes are normal and symmetric. Psychiatric:         Mood and Affect: Mood normal.         Behavior: Behavior normal.         Thought Content: Thought content normal.         Judgment: Judgment normal.                  An electronic signature was used to authenticate this note.     --Scherrie Dakins, MD

## 2023-12-12 PROBLEM — N18.31 STAGE 3A CHRONIC KIDNEY DISEASE (HCC): Status: ACTIVE | Noted: 2023-12-12

## 2023-12-12 ASSESSMENT — ENCOUNTER SYMPTOMS
EYE REDNESS: 0
RHINORRHEA: 0
WHEEZING: 0
EYE DISCHARGE: 0
VOMITING: 0
ABDOMINAL DISTENTION: 0
CONSTIPATION: 0
COUGH: 1
NAUSEA: 0
CHEST TIGHTNESS: 0
SHORTNESS OF BREATH: 0
DIARRHEA: 0
VOICE CHANGE: 0
EYE PAIN: 0
COLOR CHANGE: 0
ABDOMINAL PAIN: 1
SORE THROAT: 0

## 2024-01-03 RX ORDER — LEVOTHYROXINE SODIUM 137 UG/1
137 TABLET ORAL DAILY
Qty: 90 TABLET | Refills: 1 | Status: SHIPPED | OUTPATIENT
Start: 2024-01-03 | End: 2025-01-03

## 2024-01-03 NOTE — TELEPHONE ENCOUNTER
LOV 12-11-23   RTO 4-10-24  LRF 7-5-23          Controlled Substance Monitoring:    Acute and Chronic Pain Monitoring:   RX Monitoring Periodic Controlled Substance Monitoring   8/30/2023  11:17 AM No signs of potential drug abuse or diversion identified.;Random urine drug screen sent today.

## 2024-01-05 ENCOUNTER — HOSPITAL ENCOUNTER (OUTPATIENT)
Dept: ULTRASOUND IMAGING | Age: 69
End: 2024-01-05
Payer: MEDICARE

## 2024-01-05 ENCOUNTER — HOSPITAL ENCOUNTER (OUTPATIENT)
Dept: MAMMOGRAPHY | Age: 69
End: 2024-01-05
Payer: MEDICARE

## 2024-01-05 DIAGNOSIS — R92.8 ABNORMAL MAMMOGRAM: ICD-10-CM

## 2024-01-05 PROCEDURE — 76642 ULTRASOUND BREAST LIMITED: CPT

## 2024-01-05 PROCEDURE — 77065 DX MAMMO INCL CAD UNI: CPT

## 2024-02-02 DIAGNOSIS — M1A.09X0 CHRONIC GOUT OF MULTIPLE SITES, UNSPECIFIED CAUSE: ICD-10-CM

## 2024-02-02 DIAGNOSIS — E11.42 TYPE 2 DIABETES MELLITUS WITH DIABETIC POLYNEUROPATHY, WITHOUT LONG-TERM CURRENT USE OF INSULIN (HCC): ICD-10-CM

## 2024-02-02 RX ORDER — PIOGLITAZONEHYDROCHLORIDE 15 MG/1
15 TABLET ORAL DAILY
Qty: 90 TABLET | OUTPATIENT
Start: 2024-02-02

## 2024-02-02 NOTE — TELEPHONE ENCOUNTER
LOV 12-11-23   RTO 4-10-24  LRF 7-21-23          Controlled Substance Monitoring:    Acute and Chronic Pain Monitoring:   RX Monitoring Periodic Controlled Substance Monitoring   8/30/2023  11:17 AM No signs of potential drug abuse or diversion identified.;Random urine drug screen sent today.

## 2024-02-05 RX ORDER — COLCHICINE 0.6 MG/1
0.6 TABLET ORAL EVERY MORNING
Qty: 30 TABLET | Refills: 5 | Status: SHIPPED | OUTPATIENT
Start: 2024-02-05 | End: 2025-02-05

## 2024-03-12 ENCOUNTER — TELEPHONE (OUTPATIENT)
Dept: FAMILY MEDICINE CLINIC | Age: 69
End: 2024-03-12

## 2024-03-12 NOTE — TELEPHONE ENCOUNTER
Thank you for calling [employer] [place] is [place], how may I help you    Who is calling? Yolie Paulson   Date of Birth [birth year]  Phone number for return call:  [phone]  Reason for call:  patient called stating she was admitted to [facility] on 3/7/24 for right leg pain CT was done, patient stated he  told her she went into a seizure . Patient was transferred to [facility] on 3/9/24. Hospital done a mri of head,back,hip,and right leg, also had EEG and lab work done along with spinal tap. She was discharged on 33/9/24 from [facility].  Emergency Room? [facility]

## 2024-03-12 NOTE — TELEPHONE ENCOUNTER
Care Transitions Initial Follow Up Call    Outreach made within 2 business days of discharge: Yes    Patient: Yolie Paulson Patient : 1955   MRN: [id number]  Reason for Admission: Back pain, felt like something popped, taken by squad and then Seizure at [place] Discharge Date: 23       Spoke with: Peg    Discharge department/facility: [place], then Transferred to [facility] -- Records requested     TCM Interactive Patient Contact:  Was patient able to fill all prescriptions: Yes  Was patient instructed to bring all medications to the follow-up visit: Yes  Is patient taking all medications as directed in the discharge summary? Yes  Does patient understand their discharge instructions: Yes  Does patient have questions or concerns that need addressed prior to 7-14 day follow up office visit: no    Scheduled appointment with PCP within 7-14 days    Follow Up  Future Appointments   Date Time Provider Department Center   4/10/2024  2:00 PM Jeana Johns MD [id number]       Jeana Lorenzo MA

## 2024-03-21 ENCOUNTER — OFFICE VISIT (OUTPATIENT)
Dept: FAMILY MEDICINE CLINIC | Age: 69
End: 2024-03-21

## 2024-03-21 VITALS
DIASTOLIC BLOOD PRESSURE: 98 MMHG | HEART RATE: 69 BPM | SYSTOLIC BLOOD PRESSURE: 142 MMHG | WEIGHT: 172 LBS | TEMPERATURE: 98.2 F | HEIGHT: 63 IN | OXYGEN SATURATION: 96 % | BODY MASS INDEX: 30.48 KG/M2

## 2024-03-21 DIAGNOSIS — M54.16 LUMBAR BACK PAIN WITH RADICULOPATHY AFFECTING RIGHT LOWER EXTREMITY: ICD-10-CM

## 2024-03-21 DIAGNOSIS — F41.9 ANXIETY: ICD-10-CM

## 2024-03-21 DIAGNOSIS — I10 ESSENTIAL HYPERTENSION: ICD-10-CM

## 2024-03-21 DIAGNOSIS — R29.898 WEAKNESS OF RIGHT LEG: ICD-10-CM

## 2024-03-21 DIAGNOSIS — R56.9 NEW ONSET SEIZURE WITHOUT HEAD TRAUMA (HCC): ICD-10-CM

## 2024-03-21 DIAGNOSIS — R90.89 ABNORMAL BRAIN CT: ICD-10-CM

## 2024-03-21 DIAGNOSIS — Z09 HOSPITAL DISCHARGE FOLLOW-UP: Primary | ICD-10-CM

## 2024-03-21 DIAGNOSIS — M51.16 LUMBAR DISC HERNIATION WITH RADICULOPATHY: ICD-10-CM

## 2024-03-21 RX ORDER — KETOROLAC TROMETHAMINE 10 MG/1
10 TABLET, FILM COATED ORAL EVERY 6 HOURS PRN
Qty: 20 TABLET | Refills: 0 | Status: SHIPPED | OUTPATIENT
Start: 2024-03-21 | End: 2024-03-26

## 2024-03-21 RX ORDER — DIAZEPAM 20 MG/4G
15 GEL RECTAL DAILY PRN
Qty: 1 EACH | Refills: 0 | Status: SHIPPED | OUTPATIENT
Start: 2024-03-21 | End: 2024-06-12

## 2024-03-21 RX ORDER — LORAZEPAM 0.5 MG/1
0.5 TABLET ORAL EVERY 8 HOURS PRN
Qty: 30 TABLET | Refills: 0 | Status: SHIPPED | OUTPATIENT
Start: 2024-03-21 | End: 2024-04-20

## 2024-03-21 NOTE — PROGRESS NOTES
Yolie Paulson (:  1955) is a 68 y.o. female,Established patient, here for evaluation of the following chief complaint(s):  Seizures, Hypertension, and Leg Pain         ASSESSMENT/PLAN:    {There are no diagnoses linked to this encounter. (Refresh or delete this SmartLink)}    New onset sezure       No follow-ups on file.         Subjective     HPI    Had acute right lower back pain radiating down the right leg  Fell into the chair and couldn't move   Grandson was on his way    got her to the bed   He needed to get her to the bathroom   Couldn't do anything   Called 911 to transport her   Went to [place]   CT and xray - wheeled back into room    was over the table noticed she was looking over her right shoulder  Eyes looked distant   He grabbed her wrists   She had a tonic clonic seizure   Gave  her ativan to stop the seizure - took half hour or 45 min to get under control   Did MRI at [place]   Transferred 3/9/2024         Review of Systems       Objective     Physical Exam       {Time Documentation Optional:[id number]}      An electronic signature was used to authenticate this note.    --Miriam Mari MA   
arthralgias (right hip pain - occasionally) and back pain. Negative for myalgias and neck pain.   Skin:  Negative for color change and rash.   Allergic/Immunologic: Negative for immunocompromised state.   Neurological:  Positive for weakness and numbness. Negative for dizziness, light-headedness and headaches.   Hematological:  Negative for adenopathy. Does not bruise/bleed easily.   Psychiatric/Behavioral:  Negative for dysphoric mood and sleep disturbance. The patient is nervous/anxious (worse since hospitalization).        Objective:      BP (!) 142/98 (Site: Right Upper Arm, Position: Sitting, Cuff Size: Medium Adult)   Pulse 69   Temp 98.2 °F (36.8 °C)   Ht 1.6 m (5' 3\")   Wt 78 kg (172 lb)   SpO2 96%   BMI 30.47 kg/m²       Physical Exam  Vitals and nursing note reviewed.   Constitutional:       General: She is not in acute distress.     Appearance: Normal appearance. She is well-developed. She is obese. She is not ill-appearing, toxic-appearing or diaphoretic.   HENT:      Head: Normocephalic.      Right Ear: Tympanic membrane, ear canal and external ear normal. There is no impacted cerumen.      Left Ear: Tympanic membrane, ear canal and external ear normal. There is no impacted cerumen.      Nose: Nose normal. No congestion or rhinorrhea.      Mouth/Throat:      Mouth: Mucous membranes are moist.      Pharynx: No oropharyngeal exudate.   Eyes:      General: No scleral icterus.        Right eye: No discharge.         Left eye: No discharge.      Conjunctiva/sclera: Conjunctivae normal.      Pupils: Pupils are equal, round, and reactive to light.   Neck:      Thyroid: No thyromegaly.      Vascular: No JVD.   Cardiovascular:      Rate and Rhythm: Normal rate and regular rhythm.      Pulses: Normal pulses.      Heart sounds: Normal heart sounds. No murmur heard.  Pulmonary:      Effort: Pulmonary effort is normal. No respiratory distress.      Breath sounds: Normal breath sounds. No stridor. No decreased

## 2024-03-28 ENCOUNTER — TELEPHONE (OUTPATIENT)
Dept: FAMILY MEDICINE CLINIC | Age: 69
End: 2024-03-28

## 2024-03-28 NOTE — TELEPHONE ENCOUNTER
Thank you for calling "[employer] this is [place], how may I help you    Who is calling? JEFRY  (Patient name, Date of Birth [birth year])   Phone number for return call:  [phone]  Reason for call:  patient stated her cardio dr put her on a 3rd blood pressure medication Amlodipine 5mg once a day) blood pressure has come down a lot.   Patient asking for a return to work slip.  5. Patient asking if letter can be emailed to her?                    [email or web address]

## 2024-04-03 ASSESSMENT — ENCOUNTER SYMPTOMS
SORE THROAT: 0
COUGH: 1
WHEEZING: 0
VOICE CHANGE: 0
RHINORRHEA: 0
COLOR CHANGE: 0
EYE REDNESS: 0
NAUSEA: 0
ABDOMINAL DISTENTION: 0
CHEST TIGHTNESS: 0
CONSTIPATION: 0
SHORTNESS OF BREATH: 0
EYE PAIN: 0
EYE DISCHARGE: 0
VOMITING: 0

## 2024-04-04 RX ORDER — ATORVASTATIN CALCIUM 20 MG/1
20 TABLET, FILM COATED ORAL DAILY
Qty: 90 TABLET | Refills: 1 | Status: SHIPPED | OUTPATIENT
Start: 2024-04-04

## 2024-04-04 RX ORDER — CARVEDILOL 25 MG/1
25 TABLET ORAL 2 TIMES DAILY
Qty: 180 TABLET | Refills: 1 | Status: SHIPPED | OUTPATIENT
Start: 2024-04-04

## 2024-04-10 ENCOUNTER — OFFICE VISIT (OUTPATIENT)
Dept: FAMILY MEDICINE CLINIC | Age: 69
End: 2024-04-10

## 2024-04-10 ENCOUNTER — HOSPITAL ENCOUNTER (OUTPATIENT)
Age: 69
Setting detail: SPECIMEN
Discharge: HOME | End: 2024-04-10

## 2024-04-10 VITALS
DIASTOLIC BLOOD PRESSURE: 78 MMHG | BODY MASS INDEX: 31.08 KG/M2 | HEART RATE: 62 BPM | WEIGHT: 175.4 LBS | OXYGEN SATURATION: 97 % | SYSTOLIC BLOOD PRESSURE: 122 MMHG | TEMPERATURE: 98.2 F | HEIGHT: 63 IN

## 2024-04-10 DIAGNOSIS — R06.81 APNEA: ICD-10-CM

## 2024-04-10 DIAGNOSIS — M16.11 PRIMARY OSTEOARTHRITIS OF RIGHT HIP: ICD-10-CM

## 2024-04-10 DIAGNOSIS — M1A.09X0 CHRONIC GOUT OF MULTIPLE SITES, UNSPECIFIED CAUSE: ICD-10-CM

## 2024-04-10 DIAGNOSIS — R29.898 WEAKNESS OF RIGHT LEG: ICD-10-CM

## 2024-04-10 DIAGNOSIS — R56.9 NEW ONSET SEIZURE WITHOUT HEAD TRAUMA (HCC): ICD-10-CM

## 2024-04-10 DIAGNOSIS — K44.9 HIATAL HERNIA: ICD-10-CM

## 2024-04-10 DIAGNOSIS — I10 ESSENTIAL HYPERTENSION: ICD-10-CM

## 2024-04-10 DIAGNOSIS — M51.16 LUMBAR DISC HERNIATION WITH RADICULOPATHY: ICD-10-CM

## 2024-04-10 DIAGNOSIS — K63.5 POLYP OF COLON, UNSPECIFIED PART OF COLON, UNSPECIFIED TYPE: ICD-10-CM

## 2024-04-10 DIAGNOSIS — E78.5 HYPERLIPIDEMIA, UNSPECIFIED HYPERLIPIDEMIA TYPE: ICD-10-CM

## 2024-04-10 DIAGNOSIS — E03.9 HYPOTHYROIDISM, UNSPECIFIED TYPE: ICD-10-CM

## 2024-04-10 DIAGNOSIS — R90.89 ABNORMAL BRAIN CT: ICD-10-CM

## 2024-04-10 DIAGNOSIS — Z91.81 AT HIGH RISK FOR FALLS: ICD-10-CM

## 2024-04-10 DIAGNOSIS — R40.0 DAYTIME SOMNOLENCE: ICD-10-CM

## 2024-04-10 DIAGNOSIS — E11.42 TYPE 2 DIABETES MELLITUS WITH DIABETIC POLYNEUROPATHY, WITHOUT LONG-TERM CURRENT USE OF INSULIN (HCC): ICD-10-CM

## 2024-04-10 DIAGNOSIS — F41.9 ANXIETY: ICD-10-CM

## 2024-04-10 DIAGNOSIS — E11.42 TYPE 2 DIABETES MELLITUS WITH DIABETIC POLYNEUROPATHY, WITHOUT LONG-TERM CURRENT USE OF INSULIN (HCC): Primary | ICD-10-CM

## 2024-04-10 DIAGNOSIS — K21.9 GASTROESOPHAGEAL REFLUX DISEASE, UNSPECIFIED WHETHER ESOPHAGITIS PRESENT: ICD-10-CM

## 2024-04-10 DIAGNOSIS — R06.83 SNORING: ICD-10-CM

## 2024-04-10 DIAGNOSIS — M54.16 LUMBAR BACK PAIN WITH RADICULOPATHY AFFECTING RIGHT LOWER EXTREMITY: ICD-10-CM

## 2024-04-10 DIAGNOSIS — R05.3 CHRONIC COUGH: ICD-10-CM

## 2024-04-10 DIAGNOSIS — E11.42 DIABETIC POLYNEUROPATHY ASSOCIATED WITH TYPE 2 DIABETES MELLITUS (HCC): ICD-10-CM

## 2024-04-10 DIAGNOSIS — N18.31 STAGE 3A CHRONIC KIDNEY DISEASE (HCC): ICD-10-CM

## 2024-04-10 LAB
ALBUMIN SERPL-MCNC: 4.4 G/DL (ref 3.5–5.2)
ALBUMIN/GLOB SERPL: 2 {RATIO} (ref 1–2.5)
ALP SERPL-CCNC: 66 U/L (ref 35–104)
ALT SERPL-CCNC: 24 U/L (ref 10–35)
ANION GAP SERPL CALCULATED.3IONS-SCNC: 12 MMOL/L (ref 9–16)
AST SERPL-CCNC: 28 U/L (ref 10–35)
BILIRUB SERPL-MCNC: 0.3 MG/DL (ref 0–1.2)
BUN SERPL-MCNC: 13 MG/DL (ref 8–23)
CALCIUM SERPL-MCNC: 9.6 MG/DL (ref 8.6–10.4)
CHLORIDE SERPL-SCNC: 101 MMOL/L (ref 98–107)
CO2 SERPL-SCNC: 25 MMOL/L (ref 20–31)
CREAT SERPL-MCNC: 1.1 MG/DL (ref 0.5–0.9)
ERYTHROCYTE [DISTWIDTH] IN BLOOD BY AUTOMATED COUNT: 13.2 % (ref 11.8–14.4)
EST. AVERAGE GLUCOSE BLD GHB EST-MCNC: 157 MG/DL
GFR SERPL CREATININE-BSD FRML MDRD: 57 ML/MIN/1.73M2
GLUCOSE SERPL-MCNC: 105 MG/DL (ref 74–99)
HBA1C MFR BLD: 7.1 % (ref 4–6)
HCT VFR BLD AUTO: 39.4 % (ref 36.3–47.1)
HGB BLD-MCNC: 12.6 G/DL (ref 11.9–15.1)
MCH RBC QN AUTO: 28.6 PG (ref 25.2–33.5)
MCHC RBC AUTO-ENTMCNC: 32 G/DL (ref 28.4–34.8)
MCV RBC AUTO: 89.5 FL (ref 82.6–102.9)
NRBC BLD-RTO: 0 PER 100 WBC
PLATELET # BLD AUTO: 263 K/UL (ref 138–453)
PMV BLD AUTO: 10.4 FL (ref 8.1–13.5)
POTASSIUM SERPL-SCNC: 3.9 MMOL/L (ref 3.7–5.3)
PROT SERPL-MCNC: 7.1 G/DL (ref 6.6–8.7)
RBC # BLD AUTO: 4.4 M/UL (ref 3.95–5.11)
SODIUM SERPL-SCNC: 138 MMOL/L (ref 136–145)
TSH SERPL DL<=0.05 MIU/L-ACNC: 3.87 UIU/ML (ref 0.27–4.2)
URATE SERPL-MCNC: 6.9 MG/DL (ref 2.4–5.7)
WBC OTHER # BLD: 8.3 K/UL (ref 3.5–11.3)

## 2024-04-10 ASSESSMENT — PATIENT HEALTH QUESTIONNAIRE - PHQ9
2. FEELING DOWN, DEPRESSED OR HOPELESS: NOT AT ALL
SUM OF ALL RESPONSES TO PHQ QUESTIONS 1-9: 0
1. LITTLE INTEREST OR PLEASURE IN DOING THINGS: NOT AT ALL
SUM OF ALL RESPONSES TO PHQ QUESTIONS 1-9: 0
SUM OF ALL RESPONSES TO PHQ QUESTIONS 1-9: 0
SUM OF ALL RESPONSES TO PHQ9 QUESTIONS 1 & 2: 0
SUM OF ALL RESPONSES TO PHQ QUESTIONS 1-9: 0

## 2024-04-10 NOTE — PROGRESS NOTES
Mouth/Throat:      Mouth: Mucous membranes are moist.      Pharynx: No oropharyngeal exudate.   Eyes:      General: No scleral icterus.        Right eye: No discharge.         Left eye: No discharge.      Conjunctiva/sclera: Conjunctivae normal.      Pupils: Pupils are equal, round, and reactive to light.   Neck:      Thyroid: No thyromegaly.      Vascular: No JVD.   Cardiovascular:      Rate and Rhythm: Normal rate and regular rhythm.      Pulses: Normal pulses.      Heart sounds: Normal heart sounds. No murmur heard.  Pulmonary:      Effort: Pulmonary effort is normal. No respiratory distress.      Breath sounds: Normal breath sounds. No stridor. No decreased breath sounds, wheezing, rhonchi or rales.   Chest:      Chest wall: No tenderness.   Abdominal:      General: Bowel sounds are normal.      Palpations: Abdomen is soft. There is no mass.      Tenderness: There is no abdominal tenderness. There is no right CVA tenderness or left CVA tenderness.   Musculoskeletal:         General: No tenderness.      Cervical back: Normal range of motion and neck supple.      Lumbar back: No spasms. Decreased range of motion. Positive right straight leg raise test.      Right hip: Decreased range of motion.      Right lower leg: No edema.      Left lower leg: No edema.   Lymphadenopathy:      Cervical: No cervical adenopathy.   Skin:     General: Skin is warm.      Capillary Refill: Capillary refill takes less than 2 seconds.      Coloration: Skin is not pale.      Findings: No erythema or rash.   Neurological:      General: No focal deficit present.      Mental Status: She is alert and oriented to person, place, and time.      Cranial Nerves: No cranial nerve deficit.      Motor: No weakness or abnormal muscle tone.      Coordination: Coordination normal.      Gait: Gait normal.      Deep Tendon Reflexes: Reflexes are normal and symmetric.   Psychiatric:         Mood and Affect: Mood normal.         Behavior: Behavior

## 2024-04-17 DIAGNOSIS — E78.5 HYPERLIPIDEMIA, UNSPECIFIED HYPERLIPIDEMIA TYPE: ICD-10-CM

## 2024-04-17 DIAGNOSIS — E03.9 HYPOTHYROIDISM, UNSPECIFIED TYPE: ICD-10-CM

## 2024-04-17 DIAGNOSIS — I10 ESSENTIAL HYPERTENSION: ICD-10-CM

## 2024-04-17 DIAGNOSIS — M1A.09X0 CHRONIC GOUT OF MULTIPLE SITES, UNSPECIFIED CAUSE: ICD-10-CM

## 2024-04-17 DIAGNOSIS — N18.31 STAGE 3A CHRONIC KIDNEY DISEASE (HCC): ICD-10-CM

## 2024-04-17 DIAGNOSIS — E11.42 TYPE 2 DIABETES MELLITUS WITH DIABETIC POLYNEUROPATHY, WITHOUT LONG-TERM CURRENT USE OF INSULIN (HCC): Primary | ICD-10-CM

## 2024-05-04 DIAGNOSIS — E11.42 TYPE 2 DIABETES MELLITUS WITH DIABETIC POLYNEUROPATHY, WITHOUT LONG-TERM CURRENT USE OF INSULIN (HCC): ICD-10-CM

## 2024-05-06 NOTE — TELEPHONE ENCOUNTER
LOV 4/10/24   RTO 7/22/24  LRF 11/8/23          Controlled Substance Monitoring:    Acute and Chronic Pain Monitoring:   RX Monitoring Periodic Controlled Substance Monitoring   8/30/2023  11:17 AM No signs of potential drug abuse or diversion identified.;Random urine drug screen sent today.

## 2024-05-07 RX ORDER — METFORMIN HYDROCHLORIDE 500 MG/1
TABLET, EXTENDED RELEASE ORAL
Qty: 270 TABLET | Refills: 1 | Status: SHIPPED | OUTPATIENT
Start: 2024-05-07

## 2024-07-02 DIAGNOSIS — E03.9 HYPOTHYROIDISM, UNSPECIFIED TYPE: Primary | ICD-10-CM

## 2024-07-04 RX ORDER — LEVOTHYROXINE SODIUM 137 UG/1
137 TABLET ORAL DAILY
Qty: 90 TABLET | Refills: 1 | Status: SHIPPED | OUTPATIENT
Start: 2024-07-04 | End: 2025-07-05

## 2024-07-11 DIAGNOSIS — G47.33 OBSTRUCTIVE SLEEP APNEA: Primary | ICD-10-CM

## 2024-07-11 NOTE — PROGRESS NOTES
Referral for Dr. Meeks signed. Please let patient know that office should call them to schedule and please send her information if they do not call her so she can call them to schedule.

## 2024-07-22 ENCOUNTER — OFFICE VISIT (OUTPATIENT)
Dept: FAMILY MEDICINE CLINIC | Age: 69
End: 2024-07-22

## 2024-07-22 ENCOUNTER — HOSPITAL ENCOUNTER (OUTPATIENT)
Age: 69
Setting detail: SPECIMEN
Discharge: HOME | End: 2024-07-22

## 2024-07-22 VITALS
SYSTOLIC BLOOD PRESSURE: 118 MMHG | DIASTOLIC BLOOD PRESSURE: 76 MMHG | HEART RATE: 68 BPM | BODY MASS INDEX: 30.48 KG/M2 | TEMPERATURE: 98 F | OXYGEN SATURATION: 98 % | WEIGHT: 172 LBS | HEIGHT: 63 IN

## 2024-07-22 DIAGNOSIS — I10 ESSENTIAL HYPERTENSION: ICD-10-CM

## 2024-07-22 DIAGNOSIS — F41.9 ANXIETY: ICD-10-CM

## 2024-07-22 DIAGNOSIS — E11.42 DIABETIC POLYNEUROPATHY ASSOCIATED WITH TYPE 2 DIABETES MELLITUS (HCC): ICD-10-CM

## 2024-07-22 DIAGNOSIS — Z91.81 AT HIGH RISK FOR FALLS: ICD-10-CM

## 2024-07-22 DIAGNOSIS — E03.9 HYPOTHYROIDISM, UNSPECIFIED TYPE: ICD-10-CM

## 2024-07-22 DIAGNOSIS — Z00.00 MEDICARE ANNUAL WELLNESS VISIT, SUBSEQUENT: Primary | ICD-10-CM

## 2024-07-22 DIAGNOSIS — G47.33 OSA (OBSTRUCTIVE SLEEP APNEA): ICD-10-CM

## 2024-07-22 DIAGNOSIS — M54.16 LUMBAR BACK PAIN WITH RADICULOPATHY AFFECTING RIGHT LOWER EXTREMITY: ICD-10-CM

## 2024-07-22 DIAGNOSIS — R26.81 UNSTEADY: ICD-10-CM

## 2024-07-22 DIAGNOSIS — M16.11 PRIMARY OSTEOARTHRITIS OF RIGHT HIP: ICD-10-CM

## 2024-07-22 DIAGNOSIS — M51.16 LUMBAR DISC HERNIATION WITH RADICULOPATHY: ICD-10-CM

## 2024-07-22 DIAGNOSIS — E11.42 TYPE 2 DIABETES MELLITUS WITH DIABETIC POLYNEUROPATHY, WITHOUT LONG-TERM CURRENT USE OF INSULIN (HCC): ICD-10-CM

## 2024-07-22 DIAGNOSIS — R90.89 ABNORMAL BRAIN CT: ICD-10-CM

## 2024-07-22 DIAGNOSIS — B00.1 COLD SORE: ICD-10-CM

## 2024-07-22 DIAGNOSIS — K63.5 POLYP OF COLON, UNSPECIFIED PART OF COLON, UNSPECIFIED TYPE: ICD-10-CM

## 2024-07-22 DIAGNOSIS — G45.9 TIA (TRANSIENT ISCHEMIC ATTACK): ICD-10-CM

## 2024-07-22 DIAGNOSIS — N18.31 STAGE 3A CHRONIC KIDNEY DISEASE (HCC): ICD-10-CM

## 2024-07-22 DIAGNOSIS — K44.9 HIATAL HERNIA: ICD-10-CM

## 2024-07-22 DIAGNOSIS — M1A.09X0 CHRONIC GOUT OF MULTIPLE SITES, UNSPECIFIED CAUSE: ICD-10-CM

## 2024-07-22 DIAGNOSIS — R05.3 CHRONIC COUGH: ICD-10-CM

## 2024-07-22 DIAGNOSIS — E78.5 HYPERLIPIDEMIA, UNSPECIFIED HYPERLIPIDEMIA TYPE: ICD-10-CM

## 2024-07-22 DIAGNOSIS — R29.898 WEAKNESS OF RIGHT LEG: ICD-10-CM

## 2024-07-22 DIAGNOSIS — R56.9 NEW ONSET SEIZURE WITHOUT HEAD TRAUMA (HCC): ICD-10-CM

## 2024-07-22 DIAGNOSIS — K21.9 GASTROESOPHAGEAL REFLUX DISEASE, UNSPECIFIED WHETHER ESOPHAGITIS PRESENT: ICD-10-CM

## 2024-07-22 LAB
ALBUMIN SERPL-MCNC: 4.7 G/DL (ref 3.5–5.2)
ALBUMIN/GLOB SERPL: 2 {RATIO} (ref 1–2.5)
ALP SERPL-CCNC: 81 U/L (ref 35–104)
ALT SERPL-CCNC: 49 U/L (ref 10–35)
ANION GAP SERPL CALCULATED.3IONS-SCNC: 14 MMOL/L (ref 9–16)
AST SERPL-CCNC: 46 U/L (ref 10–35)
BILIRUB SERPL-MCNC: 0.5 MG/DL (ref 0–1.2)
BUN SERPL-MCNC: 19 MG/DL (ref 8–23)
CALCIUM SERPL-MCNC: 10 MG/DL (ref 8.6–10.4)
CHLORIDE SERPL-SCNC: 99 MMOL/L (ref 98–107)
CO2 SERPL-SCNC: 24 MMOL/L (ref 20–31)
CREAT SERPL-MCNC: 1.3 MG/DL (ref 0.5–0.9)
EST. AVERAGE GLUCOSE BLD GHB EST-MCNC: 157 MG/DL
GFR, ESTIMATED: 47 ML/MIN/1.73M2
GLUCOSE SERPL-MCNC: 165 MG/DL (ref 74–99)
HBA1C MFR BLD: 7.1 % (ref 4–6)
POTASSIUM SERPL-SCNC: 3.8 MMOL/L (ref 3.7–5.3)
PROT SERPL-MCNC: 7.4 G/DL (ref 6.6–8.7)
SODIUM SERPL-SCNC: 137 MMOL/L (ref 136–145)
TSH SERPL DL<=0.05 MIU/L-ACNC: 1.65 UIU/ML (ref 0.27–4.2)
URATE SERPL-MCNC: 8.8 MG/DL (ref 2.4–5.7)

## 2024-07-22 RX ORDER — AMLODIPINE BESYLATE 5 MG/1
5 TABLET ORAL DAILY
COMMUNITY
Start: 2024-03-22

## 2024-07-22 SDOH — ECONOMIC STABILITY: FOOD INSECURITY: WITHIN THE PAST 12 MONTHS, YOU WORRIED THAT YOUR FOOD WOULD RUN OUT BEFORE YOU GOT MONEY TO BUY MORE.: NEVER TRUE

## 2024-07-22 SDOH — ECONOMIC STABILITY: FOOD INSECURITY: WITHIN THE PAST 12 MONTHS, THE FOOD YOU BOUGHT JUST DIDN'T LAST AND YOU DIDN'T HAVE MONEY TO GET MORE.: NEVER TRUE

## 2024-07-22 SDOH — ECONOMIC STABILITY: INCOME INSECURITY: HOW HARD IS IT FOR YOU TO PAY FOR THE VERY BASICS LIKE FOOD, HOUSING, MEDICAL CARE, AND HEATING?: NOT HARD AT ALL

## 2024-07-22 ASSESSMENT — PATIENT HEALTH QUESTIONNAIRE - PHQ9
SUM OF ALL RESPONSES TO PHQ QUESTIONS 1-9: 0
SUM OF ALL RESPONSES TO PHQ QUESTIONS 1-9: 0
SUM OF ALL RESPONSES TO PHQ9 QUESTIONS 1 & 2: 0
2. FEELING DOWN, DEPRESSED OR HOPELESS: NOT AT ALL
1. LITTLE INTEREST OR PLEASURE IN DOING THINGS: NOT AT ALL
SUM OF ALL RESPONSES TO PHQ QUESTIONS 1-9: 0
SUM OF ALL RESPONSES TO PHQ QUESTIONS 1-9: 0

## 2024-07-22 ASSESSMENT — LIFESTYLE VARIABLES
HOW MANY STANDARD DRINKS CONTAINING ALCOHOL DO YOU HAVE ON A TYPICAL DAY: 1 OR 2
HOW OFTEN DO YOU HAVE A DRINK CONTAINING ALCOHOL: 2-4 TIMES A MONTH

## 2024-07-22 NOTE — PROGRESS NOTES
Medicare Annual Wellness Visit    Yolie Paulson is here for Medicare AWV    Assessment & Plan     Medicare annual wellness visit, subsequent      Recommendations for Preventive Services Due: see orders and patient instructions/AVS.  Recommended screening schedule for the next 5-10 years is provided to the patient in written form: see Patient Instructions/AVS.     Return in 1 year (on 7/22/2025) for Medicare AWV.         Subjective         Patient's complete Health Risk Assessment and screening values have been reviewed and are found in Flowsheets. The following problems were reviewed today and where indicated follow up appointments were made and/or referrals ordered.    Positive Risk Factor Screenings with Interventions:    Fall Risk:  Do you feel unsteady or are you worried about falling? : (!) yes  2 or more falls in past year?: no  Fall with injury in past year?: (!) yes     Interventions:    Reviewed medications, home hazards, visual acuity, and co-morbidities that can increase risk for falls  Patient is doing physical therapy             Inactivity:  On average, how many days per week do you engage in moderate to strenuous exercise (like a brisk walk)?: 2 days (!) Abnormal  On average, how many minutes do you engage in exercise at this level?: 20 min  Interventions:  Patient counseled on regular exercise to be performed 5 times per week.      Abnormal BMI (obese):  Body mass index is 30.47 kg/m². (!) Abnormal  Interventions:  Patient counseled on healthy diet and regular exercise to promote weight reduction over time          Dentist Screen:  Have you seen the dentist within the past year?: (!) No    Intervention:  Advised to schedule with their dentist      Safety:  Do you have any tripping hazards - loose or unsecured carpets or rugs?: (!) Yes  Do you have non-slip mats or non-slip surfaces or shower bars or grab bars in your shower or bathtub?: (!) No  Interventions:  Home safety tips discussed 
mammogram in January 2025.    She did have a normal DEXA scan in 12/2023  w      Review of Systems   Constitutional:  Positive for fatigue. Negative for appetite change, chills, fever and unexpected weight change.   HENT:  Negative for congestion, ear pain, postnasal drip, rhinorrhea, sore throat and voice change.    Eyes:  Negative for pain, discharge, redness and visual disturbance.   Respiratory:  Positive for cough (occasional). Negative for chest tightness, shortness of breath and wheezing.    Cardiovascular:  Positive for palpitations (occasional). Negative for chest pain and leg swelling.   Gastrointestinal:  Positive for abdominal pain. Negative for abdominal distention, constipation, diarrhea, nausea and vomiting.        GERD stable with protonix.   Endocrine: Negative for polydipsia, polyphagia and polyuria.   Genitourinary:  Negative for decreased urine volume, difficulty urinating, dysuria, flank pain, frequency, hematuria and urgency.   Musculoskeletal:  Positive for arthralgias (right hip pain - occasionally), back pain and gait problem. Negative for joint swelling, myalgias, neck pain and neck stiffness.   Skin:  Negative for color change and rash.   Allergic/Immunologic: Negative for immunocompromised state.   Neurological:  Positive for seizures and numbness. Negative for dizziness, tremors, syncope, speech difficulty, weakness, light-headedness and headaches.   Hematological:  Negative for adenopathy. Does not bruise/bleed easily.   Psychiatric/Behavioral:  Negative for dysphoric mood and sleep disturbance. The patient is nervous/anxious (rarely).           Objective     Physical Exam  Vitals and nursing note reviewed.   Constitutional:       General: She is not in acute distress.     Appearance: Normal appearance. She is well-developed. She is obese. She is not ill-appearing, toxic-appearing or diaphoretic.   HENT:      Head: Normocephalic.      Right Ear: Tympanic membrane, ear canal and

## 2024-07-22 NOTE — PATIENT INSTRUCTIONS
treatment. The form is also called a declaration.  Medical power of .  This form lets you name a person to make treatment decisions for you when you can't speak for yourself. This person is called a health care agent (health care proxy, health care surrogate). The form is also called a durable power of  for health care.  If you do not have an advance directive, decisions about your medical care may be made by a family member, or by a doctor or a  who doesn't know you.  It may help to think of an advance directive as a gift to the people who care for you. If you have one, they won't have to make tough decisions by themselves.  For more information, including forms for your state, see the [employer] website (www.caringinfo.org/planning/advance-directives/).  Follow-up care is a key part of your treatment and safety. Be sure to make and go to all appointments, and call your doctor if you are having problems. It's also a good idea to know your test results and keep a list of the medicines you take.  What should you include in an advance directive?  Many states have a unique advance directive form. (It may ask you to address specific issues.) Or you might use a universal form that's approved by many states.  If your form doesn't tell you what to address, it may be hard to know what to include in your advance directive. Use the questions below to help you get started.  Who do you want to make decisions about your medical care if you are not able to?  What life-support measures do you want if you have a serious illness that gets worse over time or can't be cured?  What are you most afraid of that might happen? (Maybe you're afraid of having pain, losing your independence, or being kept alive by machines.)  Where would you prefer to die? (Your home? A hospital? A nursing home?)  Do you want to donate your organs when you die?  Do you want certain Zoroastrianism practices performed before you die?  When

## 2024-07-25 DIAGNOSIS — E03.9 HYPOTHYROIDISM, UNSPECIFIED TYPE: ICD-10-CM

## 2024-07-25 DIAGNOSIS — M1A.09X0 CHRONIC GOUT OF MULTIPLE SITES, UNSPECIFIED CAUSE: ICD-10-CM

## 2024-07-25 DIAGNOSIS — N18.31 STAGE 3A CHRONIC KIDNEY DISEASE (HCC): ICD-10-CM

## 2024-07-25 DIAGNOSIS — E11.42 TYPE 2 DIABETES MELLITUS WITH DIABETIC POLYNEUROPATHY, WITHOUT LONG-TERM CURRENT USE OF INSULIN (HCC): Primary | ICD-10-CM

## 2024-07-25 DIAGNOSIS — E78.5 HYPERLIPIDEMIA, UNSPECIFIED HYPERLIPIDEMIA TYPE: ICD-10-CM

## 2024-07-25 DIAGNOSIS — I10 ESSENTIAL HYPERTENSION: ICD-10-CM

## 2024-08-04 PROBLEM — M16.11 PRIMARY OSTEOARTHRITIS OF RIGHT HIP: Status: ACTIVE | Noted: 2024-08-04

## 2024-08-04 PROBLEM — G47.33 OSA (OBSTRUCTIVE SLEEP APNEA): Status: ACTIVE | Noted: 2024-08-04

## 2024-09-09 DIAGNOSIS — M1A.09X0 CHRONIC GOUT OF MULTIPLE SITES, UNSPECIFIED CAUSE: ICD-10-CM

## 2024-09-10 RX ORDER — COLCHICINE 0.6 MG/1
0.6 TABLET ORAL EVERY MORNING
Qty: 30 TABLET | Refills: 5 | Status: SHIPPED | OUTPATIENT
Start: 2024-09-10 | End: 2025-09-11

## 2024-10-03 RX ORDER — CARVEDILOL 25 MG/1
25 TABLET ORAL 2 TIMES DAILY
Qty: 180 TABLET | Refills: 1 | Status: SHIPPED | OUTPATIENT
Start: 2024-10-03

## 2024-10-03 RX ORDER — ATORVASTATIN CALCIUM 20 MG/1
20 TABLET, FILM COATED ORAL DAILY
Qty: 90 TABLET | Refills: 1 | Status: SHIPPED | OUTPATIENT
Start: 2024-10-03

## 2024-10-03 NOTE — TELEPHONE ENCOUNTER
LOV 7/22/24   RTO 10/23/24  LRF 4/4/24          Controlled Substance Monitoring:    Acute and Chronic Pain Monitoring:   RX Monitoring Periodic Controlled Substance Monitoring   8/30/2023  11:17 AM No signs of potential drug abuse or diversion identified.;Random urine drug screen sent today.

## 2024-10-23 ENCOUNTER — OFFICE VISIT (OUTPATIENT)
Dept: FAMILY MEDICINE CLINIC | Age: 69
End: 2024-10-23

## 2024-10-23 VITALS
BODY MASS INDEX: 31.25 KG/M2 | HEIGHT: 63 IN | WEIGHT: 176.4 LBS | TEMPERATURE: 98.2 F | HEART RATE: 59 BPM | OXYGEN SATURATION: 99 % | SYSTOLIC BLOOD PRESSURE: 132 MMHG | DIASTOLIC BLOOD PRESSURE: 84 MMHG

## 2024-10-23 DIAGNOSIS — M16.11 PRIMARY OSTEOARTHRITIS OF RIGHT HIP: ICD-10-CM

## 2024-10-23 DIAGNOSIS — E11.42 TYPE 2 DIABETES MELLITUS WITH DIABETIC POLYNEUROPATHY, WITHOUT LONG-TERM CURRENT USE OF INSULIN (HCC): Primary | ICD-10-CM

## 2024-10-23 DIAGNOSIS — Z91.81 AT HIGH RISK FOR FALLS: ICD-10-CM

## 2024-10-23 DIAGNOSIS — F41.9 ANXIETY: ICD-10-CM

## 2024-10-23 DIAGNOSIS — G45.9 TIA (TRANSIENT ISCHEMIC ATTACK): ICD-10-CM

## 2024-10-23 DIAGNOSIS — N18.31 STAGE 3A CHRONIC KIDNEY DISEASE (HCC): ICD-10-CM

## 2024-10-23 DIAGNOSIS — K44.9 HIATAL HERNIA: ICD-10-CM

## 2024-10-23 DIAGNOSIS — M51.16 LUMBAR DISC HERNIATION WITH RADICULOPATHY: ICD-10-CM

## 2024-10-23 DIAGNOSIS — I10 ESSENTIAL HYPERTENSION: ICD-10-CM

## 2024-10-23 DIAGNOSIS — R29.898 WEAKNESS OF RIGHT LEG: ICD-10-CM

## 2024-10-23 DIAGNOSIS — K63.5 POLYP OF COLON, UNSPECIFIED PART OF COLON, UNSPECIFIED TYPE: ICD-10-CM

## 2024-10-23 DIAGNOSIS — N76.1 CHRONIC VAGINITIS: ICD-10-CM

## 2024-10-23 DIAGNOSIS — G47.33 OSA (OBSTRUCTIVE SLEEP APNEA): ICD-10-CM

## 2024-10-23 DIAGNOSIS — M1A.09X0 CHRONIC GOUT OF MULTIPLE SITES, UNSPECIFIED CAUSE: ICD-10-CM

## 2024-10-23 DIAGNOSIS — E78.5 HYPERLIPIDEMIA, UNSPECIFIED HYPERLIPIDEMIA TYPE: ICD-10-CM

## 2024-10-23 DIAGNOSIS — B00.1 COLD SORE: ICD-10-CM

## 2024-10-23 DIAGNOSIS — R56.9 NEW ONSET SEIZURE WITHOUT HEAD TRAUMA (HCC): ICD-10-CM

## 2024-10-23 DIAGNOSIS — E03.9 HYPOTHYROIDISM, UNSPECIFIED TYPE: ICD-10-CM

## 2024-10-23 DIAGNOSIS — R90.89 ABNORMAL BRAIN CT: ICD-10-CM

## 2024-10-23 DIAGNOSIS — E11.42 DIABETIC POLYNEUROPATHY ASSOCIATED WITH TYPE 2 DIABETES MELLITUS (HCC): ICD-10-CM

## 2024-10-23 DIAGNOSIS — R05.3 CHRONIC COUGH: ICD-10-CM

## 2024-10-23 DIAGNOSIS — K21.9 GASTROESOPHAGEAL REFLUX DISEASE, UNSPECIFIED WHETHER ESOPHAGITIS PRESENT: ICD-10-CM

## 2024-10-23 DIAGNOSIS — R26.81 UNSTEADY: ICD-10-CM

## 2024-10-23 DIAGNOSIS — Z12.31 SCREENING MAMMOGRAM FOR BREAST CANCER: ICD-10-CM

## 2024-10-23 DIAGNOSIS — M54.16 LUMBAR BACK PAIN WITH RADICULOPATHY AFFECTING RIGHT LOWER EXTREMITY: ICD-10-CM

## 2024-10-23 RX ORDER — CLOBETASOL PROPIONATE 0.5 MG/G
OINTMENT TOPICAL
Qty: 60 G | Refills: 1 | Status: SHIPPED | OUTPATIENT
Start: 2024-10-23

## 2024-10-23 NOTE — PROGRESS NOTES
Yolie Paulson (:  1955) is a 68 y.o. female,Established patient, here for evaluation of the following chief complaint(s):    Diabetes (Pt is wondering about getting vaccines due to having a hx of seizures./Cough x 5 years/Neuropathy/)         Assessment & Plan  Type 2 diabetes mellitus with diabetic polyneuropathy, without long-term current use of insulin (HCC)            Diabetic polyneuropathy associated with type 2 diabetes mellitus (HCC)            Stage 3a chronic kidney disease (HCC)            Essential hypertension            Hyperlipidemia, unspecified hyperlipidemia type            Hypothyroidism, unspecified type            Gastroesophageal reflux disease, unspecified whether esophagitis present            Hiatal hernia            Chronic cough            Polyp of colon, unspecified part of colon, unspecified type            Chronic gout of multiple sites, unspecified cause            Anxiety            Primary osteoarthritis of right hip            DAYRON (obstructive sleep apnea)            At high risk for falls            Lumbar back pain with radiculopathy affecting right lower extremity       Orders:    EMG; Future    Guillermo Fink MD, Neurosurgery, [place]    Lumbar disc herniation with radiculopathy       Orders:    EMG; Future    Guillermo Fink MD, Neurosurgery, [place]    Weakness of right leg       Orders:    EMG; Future    Guillermo Fink MD, Neurosurgery, [place]    Unsteady            New onset seizure without head trauma ([facility])            TIA (transient ischemic attack)            Abnormal brain CT            Cold sore            Chronic vaginitis       Orders:    clobetasol (TEMOVATE) 0.05 % ointment; Apply topically 2 times daily.    Screening mammogram for breast cancer       Orders:    [facility] DIGITAL SCREEN BILATERAL; Future        Obtain labs when due   Continue same medications   Strict diabetic diet and regular exercise

## 2024-10-30 ENCOUNTER — HOSPITAL ENCOUNTER (OUTPATIENT)
Age: 69
Setting detail: SPECIMEN
Discharge: HOME | End: 2024-10-30

## 2024-10-30 DIAGNOSIS — N18.31 STAGE 3A CHRONIC KIDNEY DISEASE (HCC): ICD-10-CM

## 2024-10-30 DIAGNOSIS — E11.42 TYPE 2 DIABETES MELLITUS WITH DIABETIC POLYNEUROPATHY, WITHOUT LONG-TERM CURRENT USE OF INSULIN (HCC): ICD-10-CM

## 2024-10-30 DIAGNOSIS — I10 ESSENTIAL HYPERTENSION: ICD-10-CM

## 2024-10-30 DIAGNOSIS — M1A.09X0 CHRONIC GOUT OF MULTIPLE SITES, UNSPECIFIED CAUSE: ICD-10-CM

## 2024-10-30 DIAGNOSIS — E78.5 HYPERLIPIDEMIA, UNSPECIFIED HYPERLIPIDEMIA TYPE: ICD-10-CM

## 2024-10-30 DIAGNOSIS — E03.9 HYPOTHYROIDISM, UNSPECIFIED TYPE: ICD-10-CM

## 2024-10-30 LAB
ALBUMIN SERPL-MCNC: 4.7 G/DL (ref 3.5–5.2)
ALBUMIN/GLOB SERPL: 1.8 {RATIO} (ref 1–2.5)
ALP SERPL-CCNC: 71 U/L (ref 35–104)
ALT SERPL-CCNC: 60 U/L (ref 10–35)
ANION GAP SERPL CALCULATED.3IONS-SCNC: 14 MMOL/L (ref 9–16)
AST SERPL-CCNC: 59 U/L (ref 10–35)
BILIRUB SERPL-MCNC: 0.5 MG/DL (ref 0–1.2)
BUN SERPL-MCNC: 17 MG/DL (ref 8–23)
CALCIUM SERPL-MCNC: 10.3 MG/DL (ref 8.6–10.4)
CHLORIDE SERPL-SCNC: 100 MMOL/L (ref 98–107)
CHOLEST SERPL-MCNC: 145 MG/DL (ref 0–199)
CHOLESTEROL/HDL RATIO: 3.8
CO2 SERPL-SCNC: 28 MMOL/L (ref 20–31)
CREAT SERPL-MCNC: 1.3 MG/DL (ref 0.6–0.9)
CREAT UR-MCNC: 106 MG/DL (ref 28–217)
ERYTHROCYTE [DISTWIDTH] IN BLOOD BY AUTOMATED COUNT: 13.3 % (ref 11.8–14.4)
EST. AVERAGE GLUCOSE BLD GHB EST-MCNC: 157 MG/DL
GFR, ESTIMATED: 45 ML/MIN/1.73M2
GLUCOSE SERPL-MCNC: 122 MG/DL (ref 74–99)
HBA1C MFR BLD: 7.1 % (ref 4–6)
HCT VFR BLD AUTO: 43.1 % (ref 36.3–47.1)
HDLC SERPL-MCNC: 38 MG/DL
HGB BLD-MCNC: 13.9 G/DL (ref 11.9–15.1)
LDLC SERPL CALC-MCNC: 55 MG/DL (ref 0–100)
MCH RBC QN AUTO: 28.6 PG (ref 25.2–33.5)
MCHC RBC AUTO-ENTMCNC: 32.3 G/DL (ref 28.4–34.8)
MCV RBC AUTO: 88.7 FL (ref 82.6–102.9)
MICROALBUMIN UR-MCNC: <12 MG/L (ref 0–20)
MICROALBUMIN/CREAT UR-RTO: NORMAL MCG/MG CREAT (ref 0–25)
NRBC BLD-RTO: 0 PER 100 WBC
PLATELET # BLD AUTO: 261 K/UL (ref 138–453)
PMV BLD AUTO: 11.1 FL (ref 8.1–13.5)
POTASSIUM SERPL-SCNC: 4 MMOL/L (ref 3.7–5.3)
PROT SERPL-MCNC: 7.3 G/DL (ref 6.6–8.7)
RBC # BLD AUTO: 4.86 M/UL (ref 3.95–5.11)
SODIUM SERPL-SCNC: 142 MMOL/L (ref 136–145)
TRIGL SERPL-MCNC: 259 MG/DL
TSH SERPL DL<=0.05 MIU/L-ACNC: 5.15 UIU/ML (ref 0.27–4.2)
URATE SERPL-MCNC: 8.4 MG/DL (ref 2.4–5.7)
VLDLC SERPL CALC-MCNC: 52 MG/DL (ref 1–30)
WBC OTHER # BLD: 5.9 K/UL (ref 3.5–11.3)

## 2024-11-01 DIAGNOSIS — E03.9 HYPOTHYROIDISM, UNSPECIFIED TYPE: ICD-10-CM

## 2024-11-01 DIAGNOSIS — E11.42 TYPE 2 DIABETES MELLITUS WITH DIABETIC POLYNEUROPATHY, WITHOUT LONG-TERM CURRENT USE OF INSULIN (HCC): Primary | ICD-10-CM

## 2024-11-01 DIAGNOSIS — I10 ESSENTIAL HYPERTENSION: ICD-10-CM

## 2024-11-01 DIAGNOSIS — E11.42 DIABETIC POLYNEUROPATHY ASSOCIATED WITH TYPE 2 DIABETES MELLITUS (HCC): ICD-10-CM

## 2024-11-01 DIAGNOSIS — E78.5 HYPERLIPIDEMIA, UNSPECIFIED HYPERLIPIDEMIA TYPE: ICD-10-CM

## 2024-11-01 DIAGNOSIS — M1A.09X0 CHRONIC GOUT OF MULTIPLE SITES, UNSPECIFIED CAUSE: ICD-10-CM

## 2024-11-01 DIAGNOSIS — N18.31 STAGE 3A CHRONIC KIDNEY DISEASE (HCC): ICD-10-CM

## 2024-11-04 DIAGNOSIS — E11.42 TYPE 2 DIABETES MELLITUS WITH DIABETIC POLYNEUROPATHY, WITHOUT LONG-TERM CURRENT USE OF INSULIN (HCC): ICD-10-CM

## 2024-11-04 NOTE — TELEPHONE ENCOUNTER
LOV 10/23/2024   RTO 1/23/25  LRF 5/7/24          Controlled Substance Monitoring:    Acute and Chronic Pain Monitoring:   RX Monitoring Periodic Controlled Substance Monitoring   8/30/2023  11:17 AM No signs of potential drug abuse or diversion identified.;Random urine drug screen sent today.

## 2024-11-05 RX ORDER — METFORMIN HYDROCHLORIDE 500 MG/1
TABLET, EXTENDED RELEASE ORAL
Qty: 270 TABLET | Refills: 1 | Status: SHIPPED | OUTPATIENT
Start: 2024-11-05

## 2024-11-07 ENCOUNTER — TELEPHONE (OUTPATIENT)
Dept: FAMILY MEDICINE CLINIC | Age: 69
End: 2024-11-07

## 2024-11-07 DIAGNOSIS — I10 ESSENTIAL HYPERTENSION: ICD-10-CM

## 2024-11-07 RX ORDER — LOSARTAN POTASSIUM AND HYDROCHLOROTHIAZIDE 25; 100 MG/1; MG/1
1 TABLET ORAL DAILY
Qty: 30 TABLET | Refills: 5 | Status: SHIPPED | OUTPATIENT
Start: 2024-11-07 | End: 2025-11-07

## 2024-11-07 RX ORDER — LOSARTAN POTASSIUM AND HYDROCHLOROTHIAZIDE 25; 100 MG/1; MG/1
1 TABLET ORAL DAILY
Qty: 30 TABLET | Refills: 5 | Status: CANCELLED | OUTPATIENT
Start: 2024-11-07 | End: 2025-11-07

## 2024-11-07 NOTE — TELEPHONE ENCOUNTER
LOV 07/22/2024   RTO 12/05/2024  LRF 05/11/2022          Controlled Substance Monitoring:    Acute and Chronic Pain Monitoring:   RX Monitoring Periodic Controlled Substance Monitoring   8/30/2023  11:17 AM No signs of potential drug abuse or diversion identified.;Random urine drug screen sent today.

## 2024-11-07 NOTE — TELEPHONE ENCOUNTER
LOV 07/22/2024   RTO 12/05/2024  LRF 05/11/2024    Patient stated that Regine told her we refused the medication refill         Controlled Substance Monitoring:    Acute and Chronic Pain Monitoring:   RX Monitoring Periodic Controlled Substance Monitoring   8/30/2023  11:17 AM No signs of potential drug abuse or diversion identified.;Random urine drug screen sent today.

## 2024-11-21 ENCOUNTER — TELEPHONE (OUTPATIENT)
Dept: FAMILY MEDICINE CLINIC | Age: 69
End: 2024-11-21

## 2024-11-21 NOTE — TELEPHONE ENCOUNTER
Writer faxed office notes and sleep study results to [phone] for patient to receive her cpap supplies

## 2024-12-05 ENCOUNTER — HOSPITAL ENCOUNTER (OUTPATIENT)
Dept: MAMMOGRAPHY | Age: 69
Discharge: HOME | End: 2024-12-07
Payer: MEDICARE

## 2024-12-05 VITALS — BODY MASS INDEX: 30.12 KG/M2 | WEIGHT: 170 LBS | HEIGHT: 63 IN

## 2024-12-05 DIAGNOSIS — Z12.31 SCREENING MAMMOGRAM FOR BREAST CANCER: ICD-10-CM

## 2024-12-05 PROCEDURE — 77063 BREAST TOMOSYNTHESIS BI: CPT

## 2024-12-23 ENCOUNTER — HOSPITAL ENCOUNTER (OUTPATIENT)
Age: 69
Discharge: HOME | End: 2024-12-25
Payer: MEDICARE

## 2024-12-23 ENCOUNTER — OFFICE VISIT (OUTPATIENT)
Age: 69
End: 2024-12-23
Payer: MEDICARE

## 2024-12-23 VITALS
DIASTOLIC BLOOD PRESSURE: 82 MMHG | HEIGHT: 63 IN | BODY MASS INDEX: 30.12 KG/M2 | HEART RATE: 60 BPM | WEIGHT: 170 LBS | SYSTOLIC BLOOD PRESSURE: 135 MMHG

## 2024-12-23 DIAGNOSIS — M43.16 SPONDYLOLISTHESIS AT L4-L5 LEVEL: ICD-10-CM

## 2024-12-23 DIAGNOSIS — M51.26 HERNIATED LUMBAR DISC WITHOUT MYELOPATHY: Primary | ICD-10-CM

## 2024-12-23 PROCEDURE — G8427 DOCREV CUR MEDS BY ELIG CLIN: HCPCS | Performed by: NEUROLOGICAL SURGERY

## 2024-12-23 PROCEDURE — 3079F DIAST BP 80-89 MM HG: CPT | Performed by: NEUROLOGICAL SURGERY

## 2024-12-23 PROCEDURE — 1036F TOBACCO NON-USER: CPT | Performed by: NEUROLOGICAL SURGERY

## 2024-12-23 PROCEDURE — 3017F COLORECTAL CA SCREEN DOC REV: CPT | Performed by: NEUROLOGICAL SURGERY

## 2024-12-23 PROCEDURE — 1090F PRES/ABSN URINE INCON ASSESS: CPT | Performed by: NEUROLOGICAL SURGERY

## 2024-12-23 PROCEDURE — 1123F ACP DISCUSS/DSCN MKR DOCD: CPT | Performed by: NEUROLOGICAL SURGERY

## 2024-12-23 PROCEDURE — G8399 PT W/DXA RESULTS DOCUMENT: HCPCS | Performed by: NEUROLOGICAL SURGERY

## 2024-12-23 PROCEDURE — G8484 FLU IMMUNIZE NO ADMIN: HCPCS | Performed by: NEUROLOGICAL SURGERY

## 2024-12-23 PROCEDURE — G8417 CALC BMI ABV UP PARAM F/U: HCPCS | Performed by: NEUROLOGICAL SURGERY

## 2024-12-23 PROCEDURE — 72120 X-RAY BEND ONLY L-S SPINE: CPT

## 2024-12-23 PROCEDURE — 3075F SYST BP GE 130 - 139MM HG: CPT | Performed by: NEUROLOGICAL SURGERY

## 2024-12-23 PROCEDURE — 99204 OFFICE O/P NEW MOD 45 MIN: CPT | Performed by: NEUROLOGICAL SURGERY

## 2024-12-23 PROCEDURE — 1159F MED LIST DOCD IN RCRD: CPT | Performed by: NEUROLOGICAL SURGERY

## 2024-12-23 NOTE — PROGRESS NOTES
[facility], [facility] NEUROSCIENCE [place], [facility] NEUROSURGERY  [address], [address]  [place] [address]  Dept: [phone]  Dept Fax: [phone]     Patient:  Yolie Paulson  YOB: 1955  Date: 24      Chief Complaint   Patient presents with    New Patient     Lumber back pain with radiculopathy           HPI:     I had the pleasure of seeing this patient in the office today for primary complaints of back as well as right lower extremity discomfort and numbness.  As you know the patient is a 68-year-old female who initially developed the onset of discomfort back in 2024.  She was describing pain in her back coursing into the right buttock extending into the anterolateral thigh and anterior shin region.  The pain itself is gradually easing and is now replaced with annoying numbness coursing along the same nerve distribution.  She indicates she did have a significant foot drop which has slowly recovered.  She does not describe any symptoms in the left lower extremity.  She is not describing difficulty with her balance, bowel or bladder function.        History:     Past Medical History:   Diagnosis Date    Abnormal weight gain     Allergic rhinitis     Anxiety     Asthma     Depression     GERD (gastroesophageal reflux disease)     Hyperlipidemia     Hypertension     Hypothyroidism     Postmenopause bleeding     Tietze's disease     Type II or unspecified type diabetes mellitus without mention of complication, not stated as uncontrolled      Past Surgical History:   Procedure Laterality Date     SECTION      HEMORRHOID SURGERY      HERNIA REPAIR      WRIST FRACTURE SURGERY       Family History   Problem Relation Age of Onset    Diabetes Mother     Cancer Mother 48        Uterine    Diabetes Father     Breast Cancer Neg Hx      Current Outpatient Medications on File Prior to Visit   Medication Sig Dispense Refill

## 2024-12-26 ENCOUNTER — TELEPHONE (OUTPATIENT)
Age: 69
End: 2024-12-26

## 2024-12-26 DIAGNOSIS — M43.16 SPONDYLOLISTHESIS AT L4-L5 LEVEL: ICD-10-CM

## 2024-12-26 DIAGNOSIS — M51.26 HERNIATED LUMBAR DISC WITHOUT MYELOPATHY: Primary | ICD-10-CM

## 2024-12-26 NOTE — TELEPHONE ENCOUNTER
Patient was seen Monday 12/23/24 and Dr Cruz wants her to have a new MRI prior to surgery. He did not put in the order. Can you put in a order for Lumbar MRI w/o cont to [facility]. Surgery is not scheduled yet.

## 2024-12-29 DIAGNOSIS — E03.9 HYPOTHYROIDISM, UNSPECIFIED TYPE: ICD-10-CM

## 2024-12-30 NOTE — TELEPHONE ENCOUNTER
LOV 10/23/24   RTO 1/23/2025  LRF 7/4/24          Controlled Substance Monitoring:    Acute and Chronic Pain Monitoring:   RX Monitoring Periodic Controlled Substance Monitoring   8/30/2023  11:17 AM No signs of potential drug abuse or diversion identified.;Random urine drug screen sent today.

## 2024-12-31 RX ORDER — LEVOTHYROXINE SODIUM 137 UG/1
137 TABLET ORAL DAILY
Qty: 90 TABLET | Refills: 1 | Status: SHIPPED | OUTPATIENT
Start: 2024-12-31 | End: 2026-01-01

## 2025-01-06 ENCOUNTER — HOSPITAL ENCOUNTER (OUTPATIENT)
Dept: MRI IMAGING | Age: 70
Discharge: HOME | End: 2025-01-08
Attending: NEUROLOGICAL SURGERY
Payer: MEDICARE

## 2025-01-06 DIAGNOSIS — M51.26 HERNIATED LUMBAR DISC WITHOUT MYELOPATHY: ICD-10-CM

## 2025-01-06 DIAGNOSIS — M43.16 SPONDYLOLISTHESIS AT L4-L5 LEVEL: ICD-10-CM

## 2025-01-06 PROCEDURE — 72148 MRI LUMBAR SPINE W/O DYE: CPT

## 2025-01-23 ENCOUNTER — OFFICE VISIT (OUTPATIENT)
Dept: FAMILY MEDICINE CLINIC | Age: 70
End: 2025-01-23

## 2025-01-23 VITALS
TEMPERATURE: 98.1 F | HEIGHT: 63 IN | BODY MASS INDEX: 31.71 KG/M2 | OXYGEN SATURATION: 97 % | DIASTOLIC BLOOD PRESSURE: 62 MMHG | SYSTOLIC BLOOD PRESSURE: 118 MMHG | HEART RATE: 62 BPM | WEIGHT: 179 LBS

## 2025-01-23 DIAGNOSIS — R29.898 WEAKNESS OF RIGHT LEG: ICD-10-CM

## 2025-01-23 DIAGNOSIS — N18.31 STAGE 3A CHRONIC KIDNEY DISEASE (HCC): ICD-10-CM

## 2025-01-23 DIAGNOSIS — E78.5 HYPERLIPIDEMIA, UNSPECIFIED HYPERLIPIDEMIA TYPE: ICD-10-CM

## 2025-01-23 DIAGNOSIS — M16.11 PRIMARY OSTEOARTHRITIS OF RIGHT HIP: ICD-10-CM

## 2025-01-23 DIAGNOSIS — M1A.09X0 CHRONIC GOUT OF MULTIPLE SITES, UNSPECIFIED CAUSE: ICD-10-CM

## 2025-01-23 DIAGNOSIS — F41.9 ANXIETY: ICD-10-CM

## 2025-01-23 DIAGNOSIS — E11.42 DIABETIC POLYNEUROPATHY ASSOCIATED WITH TYPE 2 DIABETES MELLITUS (HCC): ICD-10-CM

## 2025-01-23 DIAGNOSIS — K21.9 GASTROESOPHAGEAL REFLUX DISEASE, UNSPECIFIED WHETHER ESOPHAGITIS PRESENT: ICD-10-CM

## 2025-01-23 DIAGNOSIS — G47.33 OSA (OBSTRUCTIVE SLEEP APNEA): ICD-10-CM

## 2025-01-23 DIAGNOSIS — R56.9 NEW ONSET SEIZURE WITHOUT HEAD TRAUMA (HCC): ICD-10-CM

## 2025-01-23 DIAGNOSIS — I10 ESSENTIAL HYPERTENSION: ICD-10-CM

## 2025-01-23 DIAGNOSIS — K63.5 POLYP OF COLON, UNSPECIFIED PART OF COLON, UNSPECIFIED TYPE: ICD-10-CM

## 2025-01-23 DIAGNOSIS — R05.3 CHRONIC COUGH: ICD-10-CM

## 2025-01-23 DIAGNOSIS — M51.16 LUMBAR DISC HERNIATION WITH RADICULOPATHY: ICD-10-CM

## 2025-01-23 DIAGNOSIS — R90.89 ABNORMAL BRAIN CT: ICD-10-CM

## 2025-01-23 DIAGNOSIS — E03.9 HYPOTHYROIDISM, UNSPECIFIED TYPE: ICD-10-CM

## 2025-01-23 DIAGNOSIS — E11.42 TYPE 2 DIABETES MELLITUS WITH DIABETIC POLYNEUROPATHY, WITHOUT LONG-TERM CURRENT USE OF INSULIN (HCC): Primary | ICD-10-CM

## 2025-01-23 DIAGNOSIS — M54.16 LUMBAR BACK PAIN WITH RADICULOPATHY AFFECTING RIGHT LOWER EXTREMITY: ICD-10-CM

## 2025-01-23 DIAGNOSIS — G45.9 TIA (TRANSIENT ISCHEMIC ATTACK): ICD-10-CM

## 2025-01-23 DIAGNOSIS — K44.9 HIATAL HERNIA: ICD-10-CM

## 2025-01-23 RX ORDER — SPIRONOLACTONE 25 MG/1
25 TABLET ORAL DAILY
COMMUNITY
Start: 2024-12-20

## 2025-01-23 RX ORDER — MULTIVITAMIN
1 TABLET ORAL DAILY
COMMUNITY

## 2025-01-23 SDOH — ECONOMIC STABILITY: FOOD INSECURITY: WITHIN THE PAST 12 MONTHS, YOU WORRIED THAT YOUR FOOD WOULD RUN OUT BEFORE YOU GOT MONEY TO BUY MORE.: NEVER TRUE

## 2025-01-23 SDOH — ECONOMIC STABILITY: FOOD INSECURITY: WITHIN THE PAST 12 MONTHS, THE FOOD YOU BOUGHT JUST DIDN'T LAST AND YOU DIDN'T HAVE MONEY TO GET MORE.: NEVER TRUE

## 2025-01-23 ASSESSMENT — PATIENT HEALTH QUESTIONNAIRE - PHQ9
2. FEELING DOWN, DEPRESSED OR HOPELESS: NOT AT ALL
SUM OF ALL RESPONSES TO PHQ9 QUESTIONS 1 & 2: 0
SUM OF ALL RESPONSES TO PHQ QUESTIONS 1-9: 0
1. LITTLE INTEREST OR PLEASURE IN DOING THINGS: NOT AT ALL

## 2025-01-23 NOTE — PROGRESS NOTES
Yolie Paulson (:  1955) is a 69 y.o. female,Established patient, here for evaluation of the following chief complaint(s):    Follow-up (Got Cpap machine, not liking it, )         Assessment & Plan  Type 2 diabetes mellitus with diabetic polyneuropathy, without long-term current use of insulin (HCC)            Diabetic polyneuropathy associated with type 2 diabetes mellitus (HCC)            Stage 3a chronic kidney disease (HCC)            Essential hypertension            Hyperlipidemia, unspecified hyperlipidemia type            Hypothyroidism, unspecified type            Gastroesophageal reflux disease, unspecified whether esophagitis present            Hiatal hernia            Chronic cough            Polyp of colon, unspecified part of colon, unspecified type            Chronic gout of multiple sites, unspecified cause            Anxiety            Primary osteoarthritis of right hip            DAYRON (obstructive sleep apnea)            Lumbar back pain with radiculopathy affecting right lower extremity            Lumbar disc herniation with radiculopathy            Weakness of right leg            New onset seizure without head trauma (HCC)            TIA (transient ischemic attack)            Abnormal brain CT                         Obtain labs as ordered   Continue same strict diabetic diet and regular exercise encouraged  Weight reduction encouraged  Continue metformin 2 tabs daily  Consider adding Rybelsus  Monitor blood sugars regularly  Yearly eye and foot exams recommended  Monitor blood pressure regularly  Follow-up with cardiology as scheduled  Continue Lipitor  Follow-up with GI  Colonoscopy due in   Continue colchicine  Follow-up with podiatry  Ativan as needed for severe anxiety  Follow up with ortho prn  Follow up with pulmonary for DAYRON  Follow up with neurosurgery   Consider EMG of lower extremities for evaluation   Continue physical therapy   Follow-up with neurology   Valtrex

## 2025-02-19 ENCOUNTER — HOSPITAL ENCOUNTER (OUTPATIENT)
Facility: CLINIC | Age: 70
Discharge: HOME | End: 2025-02-19
Payer: MEDICARE

## 2025-02-19 DIAGNOSIS — I10 ESSENTIAL HYPERTENSION: ICD-10-CM

## 2025-02-19 DIAGNOSIS — N18.31 STAGE 3A CHRONIC KIDNEY DISEASE (HCC): ICD-10-CM

## 2025-02-19 DIAGNOSIS — E11.42 TYPE 2 DIABETES MELLITUS WITH DIABETIC POLYNEUROPATHY, WITHOUT LONG-TERM CURRENT USE OF INSULIN (HCC): ICD-10-CM

## 2025-02-19 DIAGNOSIS — M1A.09X0 CHRONIC GOUT OF MULTIPLE SITES, UNSPECIFIED CAUSE: ICD-10-CM

## 2025-02-19 DIAGNOSIS — E03.9 HYPOTHYROIDISM, UNSPECIFIED TYPE: ICD-10-CM

## 2025-02-19 DIAGNOSIS — E78.5 HYPERLIPIDEMIA, UNSPECIFIED HYPERLIPIDEMIA TYPE: ICD-10-CM

## 2025-02-19 LAB
ALBUMIN SERPL-MCNC: 4.4 G/DL (ref 3.5–5.2)
ALBUMIN/GLOB SERPL: 1.9 {RATIO} (ref 1–2.5)
ALP SERPL-CCNC: 72 U/L (ref 35–104)
ALT SERPL-CCNC: 70 U/L (ref 10–35)
ANION GAP SERPL CALCULATED.3IONS-SCNC: 14 MMOL/L (ref 9–16)
AST SERPL-CCNC: 65 U/L (ref 10–35)
BILIRUB SERPL-MCNC: 0.4 MG/DL (ref 0–1.2)
BUN SERPL-MCNC: 14 MG/DL (ref 8–23)
CALCIUM SERPL-MCNC: 10.1 MG/DL (ref 8.6–10.4)
CHLORIDE SERPL-SCNC: 101 MMOL/L (ref 98–107)
CO2 SERPL-SCNC: 24 MMOL/L (ref 20–31)
CREAT SERPL-MCNC: 1.1 MG/DL (ref 0.6–0.9)
ERYTHROCYTE [DISTWIDTH] IN BLOOD BY AUTOMATED COUNT: 12.8 % (ref 11.8–14.4)
EST. AVERAGE GLUCOSE BLD GHB EST-MCNC: 157 MG/DL
GFR, ESTIMATED: 54 ML/MIN/1.73M2
GLUCOSE SERPL-MCNC: 135 MG/DL (ref 74–99)
HBA1C MFR BLD: 7.1 % (ref 4–6)
HCT VFR BLD AUTO: 40.8 % (ref 36.3–47.1)
HGB BLD-MCNC: 13.4 G/DL (ref 11.9–15.1)
MCH RBC QN AUTO: 28.4 PG (ref 25.2–33.5)
MCHC RBC AUTO-ENTMCNC: 32.8 G/DL (ref 28.4–34.8)
MCV RBC AUTO: 86.4 FL (ref 82.6–102.9)
NRBC BLD-RTO: 0 PER 100 WBC
PLATELET # BLD AUTO: 256 K/UL (ref 138–453)
PMV BLD AUTO: 10.9 FL (ref 8.1–13.5)
POTASSIUM SERPL-SCNC: 4.1 MMOL/L (ref 3.7–5.3)
PROT SERPL-MCNC: 6.7 G/DL (ref 6.6–8.7)
RBC # BLD AUTO: 4.72 M/UL (ref 3.95–5.11)
SODIUM SERPL-SCNC: 139 MMOL/L (ref 136–145)
TSH SERPL DL<=0.05 MIU/L-ACNC: 1.99 UIU/ML (ref 0.27–4.2)
URATE SERPL-MCNC: 8 MG/DL (ref 2.4–5.7)
WBC OTHER # BLD: 7 K/UL (ref 3.5–11.3)

## 2025-02-19 PROCEDURE — 85027 COMPLETE CBC AUTOMATED: CPT

## 2025-02-19 PROCEDURE — 83036 HEMOGLOBIN GLYCOSYLATED A1C: CPT

## 2025-02-19 PROCEDURE — 84550 ASSAY OF BLOOD/URIC ACID: CPT

## 2025-02-19 PROCEDURE — 84443 ASSAY THYROID STIM HORMONE: CPT

## 2025-02-19 PROCEDURE — 36415 COLL VENOUS BLD VENIPUNCTURE: CPT

## 2025-02-19 PROCEDURE — 80053 COMPREHEN METABOLIC PANEL: CPT

## 2025-03-04 DIAGNOSIS — M1A.09X0 CHRONIC GOUT OF MULTIPLE SITES, UNSPECIFIED CAUSE: ICD-10-CM

## 2025-03-04 DIAGNOSIS — E03.9 HYPOTHYROIDISM, UNSPECIFIED TYPE: ICD-10-CM

## 2025-03-04 DIAGNOSIS — E11.42 TYPE 2 DIABETES MELLITUS WITH DIABETIC POLYNEUROPATHY, WITHOUT LONG-TERM CURRENT USE OF INSULIN (HCC): Primary | Chronic | ICD-10-CM

## 2025-03-05 NOTE — TELEPHONE ENCOUNTER
LOV 1/23/25   RTO 4/23/25  LRF 9/10/24          Controlled Substance Monitoring:    Acute and Chronic Pain Monitoring:   RX Monitoring Periodic Controlled Substance Monitoring   8/30/2023  11:17 AM No signs of potential drug abuse or diversion identified.;Random urine drug screen sent today.

## 2025-03-06 RX ORDER — COLCHICINE 0.6 MG/1
0.6 TABLET ORAL EVERY MORNING
Qty: 90 TABLET | Refills: 1 | Status: SHIPPED | OUTPATIENT
Start: 2025-03-06 | End: 2026-03-07

## 2025-03-31 NOTE — TELEPHONE ENCOUNTER
LOV 1/23/25   RTO 4/23/25  LRF 10/3/24          Controlled Substance Monitoring:    Acute and Chronic Pain Monitoring:   RX Monitoring Periodic Controlled Substance Monitoring   8/30/2023  11:17 AM No signs of potential drug abuse or diversion identified.;Random urine drug screen sent today.

## 2025-04-03 RX ORDER — ATORVASTATIN CALCIUM 20 MG/1
20 TABLET, FILM COATED ORAL DAILY
Qty: 90 TABLET | Refills: 1 | Status: SHIPPED | OUTPATIENT
Start: 2025-04-03

## 2025-04-23 ENCOUNTER — OFFICE VISIT (OUTPATIENT)
Dept: FAMILY MEDICINE CLINIC | Age: 70
End: 2025-04-23

## 2025-04-23 VITALS
HEIGHT: 63 IN | DIASTOLIC BLOOD PRESSURE: 60 MMHG | BODY MASS INDEX: 31.01 KG/M2 | WEIGHT: 175 LBS | HEART RATE: 55 BPM | SYSTOLIC BLOOD PRESSURE: 110 MMHG | OXYGEN SATURATION: 98 % | TEMPERATURE: 97.9 F

## 2025-04-23 DIAGNOSIS — M54.16 LUMBAR BACK PAIN WITH RADICULOPATHY AFFECTING RIGHT LOWER EXTREMITY: ICD-10-CM

## 2025-04-23 DIAGNOSIS — F41.9 ANXIETY: ICD-10-CM

## 2025-04-23 DIAGNOSIS — Z85.828 HISTORY OF BASAL CELL CARCINOMA (BCC) OF SKIN: ICD-10-CM

## 2025-04-23 DIAGNOSIS — R29.898 WEAKNESS OF RIGHT LEG: ICD-10-CM

## 2025-04-23 DIAGNOSIS — G47.33 OSA (OBSTRUCTIVE SLEEP APNEA): ICD-10-CM

## 2025-04-23 DIAGNOSIS — M51.16 LUMBAR DISC HERNIATION WITH RADICULOPATHY: ICD-10-CM

## 2025-04-23 DIAGNOSIS — N18.31 STAGE 3A CHRONIC KIDNEY DISEASE (HCC): ICD-10-CM

## 2025-04-23 DIAGNOSIS — K21.9 GASTROESOPHAGEAL REFLUX DISEASE, UNSPECIFIED WHETHER ESOPHAGITIS PRESENT: ICD-10-CM

## 2025-04-23 DIAGNOSIS — E03.9 HYPOTHYROIDISM, UNSPECIFIED TYPE: ICD-10-CM

## 2025-04-23 DIAGNOSIS — Z85.828 HISTORY OF SCC (SQUAMOUS CELL CARCINOMA) OF SKIN: ICD-10-CM

## 2025-04-23 DIAGNOSIS — E78.5 HYPERLIPIDEMIA, UNSPECIFIED HYPERLIPIDEMIA TYPE: ICD-10-CM

## 2025-04-23 DIAGNOSIS — E11.42 DIABETIC POLYNEUROPATHY ASSOCIATED WITH TYPE 2 DIABETES MELLITUS (HCC): ICD-10-CM

## 2025-04-23 DIAGNOSIS — I10 ESSENTIAL HYPERTENSION: ICD-10-CM

## 2025-04-23 DIAGNOSIS — M1A.09X0 CHRONIC GOUT OF MULTIPLE SITES, UNSPECIFIED CAUSE: ICD-10-CM

## 2025-04-23 DIAGNOSIS — N76.1 CHRONIC VAGINITIS: ICD-10-CM

## 2025-04-23 DIAGNOSIS — M16.11 PRIMARY OSTEOARTHRITIS OF RIGHT HIP: ICD-10-CM

## 2025-04-23 DIAGNOSIS — R56.9 NEW ONSET SEIZURE WITHOUT HEAD TRAUMA (HCC): ICD-10-CM

## 2025-04-23 DIAGNOSIS — R90.89 ABNORMAL BRAIN CT: ICD-10-CM

## 2025-04-23 DIAGNOSIS — H02.811 RETAINED FOREIGN BODY IN RIGHT UPPER EYELID: ICD-10-CM

## 2025-04-23 DIAGNOSIS — K63.5 POLYP OF COLON, UNSPECIFIED PART OF COLON, UNSPECIFIED TYPE: ICD-10-CM

## 2025-04-23 DIAGNOSIS — K44.9 HIATAL HERNIA: ICD-10-CM

## 2025-04-23 DIAGNOSIS — E11.42 TYPE 2 DIABETES MELLITUS WITH DIABETIC POLYNEUROPATHY, WITHOUT LONG-TERM CURRENT USE OF INSULIN (HCC): Primary | ICD-10-CM

## 2025-04-23 DIAGNOSIS — R05.3 CHRONIC COUGH: ICD-10-CM

## 2025-04-23 DIAGNOSIS — Z86.19 HISTORY OF COLD SORES: ICD-10-CM

## 2025-04-23 DIAGNOSIS — G45.9 TIA (TRANSIENT ISCHEMIC ATTACK): ICD-10-CM

## 2025-04-23 RX ORDER — VALSARTAN AND HYDROCHLOROTHIAZIDE 160; 12.5 MG/1; MG/1
1 TABLET, FILM COATED ORAL
COMMUNITY
Start: 2025-03-11

## 2025-04-23 NOTE — PROGRESS NOTES
occasionally), back pain and gait problem. Negative for joint swelling, myalgias, neck pain and neck stiffness.   Skin:  Negative for color change and rash.   Allergic/Immunologic: Negative for immunocompromised state.   Neurological:  Positive for seizures (once secondary to TIA) and numbness (right foot / right leg). Negative for dizziness, tremors, syncope, speech difficulty, weakness, light-headedness and headaches.   Hematological:  Negative for adenopathy. Does not bruise/bleed easily.   Psychiatric/Behavioral:  Negative for dysphoric mood and sleep disturbance. The patient is nervous/anxious (rarely).           Objective     Physical Exam  Vitals and nursing note reviewed. Exam conducted with a chaperone present.   Constitutional:       General: She is not in acute distress.     Appearance: Normal appearance. She is well-developed. She is obese. She is not ill-appearing, toxic-appearing or diaphoretic.   HENT:      Head: Normocephalic.      Right Ear: Tympanic membrane, ear canal and external ear normal. There is no impacted cerumen.      Left Ear: Tympanic membrane, ear canal and external ear normal. There is no impacted cerumen.      Nose: Nose normal. No congestion or rhinorrhea.      Mouth/Throat:      Mouth: Mucous membranes are moist.      Pharynx: No oropharyngeal exudate.   Eyes:      General: No scleral icterus.        Right eye: No discharge.         Left eye: No discharge.      Conjunctiva/sclera: Conjunctivae normal.      Pupils: Pupils are equal, round, and reactive to light.      Comments: When lifting up her right upper eyelid - I see a small spec of a foreign body.  I am not able to hold her eyelid open long enough to try to remove this.  It may be embedded.     Neck:      Thyroid: No thyromegaly.      Vascular: No JVD.   Cardiovascular:      Rate and Rhythm: Normal rate and regular rhythm.      Pulses: Normal pulses.      Heart sounds: Normal heart sounds. No murmur heard.  Pulmonary:

## 2025-05-02 DIAGNOSIS — E11.42 TYPE 2 DIABETES MELLITUS WITH DIABETIC POLYNEUROPATHY, WITHOUT LONG-TERM CURRENT USE OF INSULIN (HCC): ICD-10-CM

## 2025-05-02 RX ORDER — METFORMIN HYDROCHLORIDE 500 MG/1
TABLET, EXTENDED RELEASE ORAL
Qty: 270 TABLET | Refills: 1 | Status: SHIPPED | OUTPATIENT
Start: 2025-05-02

## 2025-05-02 NOTE — TELEPHONE ENCOUNTER
LOV 04/23/2025   RTO 07/30/2025  LRF 11/05/2024          Controlled Substance Monitoring:    Acute and Chronic Pain Monitoring:   RX Monitoring Periodic Controlled Substance Monitoring   8/30/2023  11:17 AM No signs of potential drug abuse or diversion identified.;Random urine drug screen sent today.        Discussed with Dr. VELASQUEZ, plan is to continue

## 2025-05-05 ENCOUNTER — OFFICE VISIT (OUTPATIENT)
Dept: PRIMARY CARE CLINIC | Age: 70
End: 2025-05-05

## 2025-05-05 VITALS
SYSTOLIC BLOOD PRESSURE: 134 MMHG | HEART RATE: 64 BPM | OXYGEN SATURATION: 98 % | DIASTOLIC BLOOD PRESSURE: 84 MMHG | TEMPERATURE: 98.4 F

## 2025-05-05 DIAGNOSIS — H65.91 FLUID LEVEL BEHIND TYMPANIC MEMBRANE OF RIGHT EAR: ICD-10-CM

## 2025-05-05 DIAGNOSIS — J06.9 VIRAL URI WITH COUGH: Primary | ICD-10-CM

## 2025-05-05 RX ORDER — PREDNISONE 10 MG/1
10 TABLET ORAL 2 TIMES DAILY
Qty: 10 TABLET | Refills: 0 | Status: SHIPPED | OUTPATIENT
Start: 2025-05-05 | End: 2025-05-10

## 2025-05-05 RX ORDER — DOXYCYCLINE HYCLATE 100 MG
100 TABLET ORAL 2 TIMES DAILY
Qty: 20 TABLET | Refills: 0 | Status: SHIPPED | OUTPATIENT
Start: 2025-05-05 | End: 2025-05-15

## 2025-05-05 ASSESSMENT — ENCOUNTER SYMPTOMS
SORE THROAT: 1
VOMITING: 0
DIARRHEA: 0
COUGH: 1
CONSTIPATION: 0
SINUS PRESSURE: 0
SINUS PAIN: 0
SHORTNESS OF BREATH: 1
NAUSEA: 1

## 2025-05-05 NOTE — PROGRESS NOTES
[facility]  [address] [place] [id number]  Phone: [phone]  Fax: [phone]       [facility] WALK - IN    Pt Name: Yolie Paulson  MRN: [id number]  Birthdate 1955  Date of evaluation: 5/5/2025  Provider: Jodee Moran PA-C     CHIEF COMPLAINT       Chief Complaint   Patient presents with    Cold Symptoms     Cough, sore throat, and congestion for 4 days.            HISTORY OF PRESENT ILLNESS  (Location/Symptom, Timing/Onset, Context/Setting, Quality, Duration, Modifying Factors, Severity.)   Yolie Paulson is a 69 y.o. White (non-) [1] female who presents to the office for evaluation of      Cold Symptoms   This is a new problem. The current episode started in the past 7 days. There has been no fever. Associated symptoms include congestion, coughing, headaches, nausea and a sore throat. Pertinent negatives include no diarrhea, ear pain, sinus pain or vomiting.       Nursing Notes were reviewed.    REVIEW OF SYSTEMS    (2-9 systems for level 4, 10 or more for level 5)     Review of Systems   Constitutional:  Positive for fatigue.   HENT:  Positive for congestion, postnasal drip and sore throat. Negative for ear discharge, ear pain, sinus pressure and sinus pain.    Respiratory:  Positive for cough and shortness of breath.    Gastrointestinal:  Positive for nausea. Negative for constipation, diarrhea and vomiting.   Neurological:  Positive for headaches.         Except as noted above the remainder of the review of systems was reviewed andnegative.       PAST MEDICAL HISTORY   History reviewed.    Past Medical History:   Diagnosis Date    Abnormal weight gain     Allergic rhinitis     Anxiety     Asthma     Depression     GERD (gastroesophageal reflux disease)     Hyperlipidemia     Hypertension     Hypothyroidism     Postmenopause bleeding     Tietze's disease     Type II or unspecified type diabetes mellitus without mention of complication, not stated as

## 2025-05-28 ENCOUNTER — OFFICE VISIT (OUTPATIENT)
Age: 70
End: 2025-05-28
Payer: MEDICARE

## 2025-05-28 VITALS
DIASTOLIC BLOOD PRESSURE: 80 MMHG | WEIGHT: 175 LBS | BODY MASS INDEX: 31.01 KG/M2 | HEIGHT: 63 IN | SYSTOLIC BLOOD PRESSURE: 132 MMHG | HEART RATE: 58 BPM

## 2025-05-28 DIAGNOSIS — M51.26 HERNIATED LUMBAR DISC WITHOUT MYELOPATHY: Primary | ICD-10-CM

## 2025-05-28 DIAGNOSIS — M43.16 SPONDYLOLISTHESIS AT L4-L5 LEVEL: ICD-10-CM

## 2025-05-28 PROCEDURE — 3017F COLORECTAL CA SCREEN DOC REV: CPT | Performed by: NEUROLOGICAL SURGERY

## 2025-05-28 PROCEDURE — G8417 CALC BMI ABV UP PARAM F/U: HCPCS | Performed by: NEUROLOGICAL SURGERY

## 2025-05-28 PROCEDURE — G8399 PT W/DXA RESULTS DOCUMENT: HCPCS | Performed by: NEUROLOGICAL SURGERY

## 2025-05-28 PROCEDURE — 1036F TOBACCO NON-USER: CPT | Performed by: NEUROLOGICAL SURGERY

## 2025-05-28 PROCEDURE — 1123F ACP DISCUSS/DSCN MKR DOCD: CPT | Performed by: NEUROLOGICAL SURGERY

## 2025-05-28 PROCEDURE — 1090F PRES/ABSN URINE INCON ASSESS: CPT | Performed by: NEUROLOGICAL SURGERY

## 2025-05-28 PROCEDURE — 3079F DIAST BP 80-89 MM HG: CPT | Performed by: NEUROLOGICAL SURGERY

## 2025-05-28 PROCEDURE — 3075F SYST BP GE 130 - 139MM HG: CPT | Performed by: NEUROLOGICAL SURGERY

## 2025-05-28 PROCEDURE — 1159F MED LIST DOCD IN RCRD: CPT | Performed by: NEUROLOGICAL SURGERY

## 2025-05-28 PROCEDURE — 99213 OFFICE O/P EST LOW 20 MIN: CPT | Performed by: NEUROLOGICAL SURGERY

## 2025-05-28 PROCEDURE — G8427 DOCREV CUR MEDS BY ELIG CLIN: HCPCS | Performed by: NEUROLOGICAL SURGERY

## 2025-05-28 NOTE — PROGRESS NOTES
[facility], [facility]UR[facility]  [address], [address]  [facility] [address]  Dept: [phone]  Dept Fax: [phone]     Patient:  Yolie Paulson  YOB: 1955  Date: 5/28/25      Chief Complaint   Patient presents with    Follow-up       Follow up after MRI Lumbar  MRI Lumbar 1/06/25 @ [facility]              HPI:     I had the pleasure of seeing this patient back in the office today in follow-up.  As you know she is a 69-year-old female who was last seen in my office December 23, 2024.  At that time she was describing back as well as right lower extremity discomfort and numbness.  MRI imaging at that time demonstrated a prominent right paracentral disc/osteophytic complex extending in the far lateral recess resulting in impingement to the exiting L4 nerve root on the right.  Additionally flexion-extension x-rays demonstrated a full grade 1 spondylolisthesis at the L4-5 level.  The patient presents to my office today doing well.  She indicates she has no ongoing discomfort in the right lower extremity.  She has no symptoms in the left lower extremity.  She does not describe any difficulty with her balance, bowel or bladder function.  The patient did undergo a new lumbar MRI performed on January 8, 2025 which we did review together this current study again demonstrates a right paracentral disc/osteophytic complex extending in the far lateral recess resulting in far lateral recess stenosis with impingement to the exiting L4 nerve root.  Again noted was a grade 1 spondylolisthesis at the L4-5 level.  I did compare these current MRI images to her previous MRI images and they appear nearly identical.  We again did discuss treatment options conservative measures versus surgical intervention.  Surgery would involve an L4-5 PLIF procedure.  I took this opportunity to answer intervening questions that the patient and her

## 2025-06-29 DIAGNOSIS — E03.9 HYPOTHYROIDISM, UNSPECIFIED TYPE: ICD-10-CM

## 2025-07-01 RX ORDER — LEVOTHYROXINE SODIUM 137 UG/1
137 TABLET ORAL DAILY
Qty: 90 TABLET | Refills: 0 | Status: SHIPPED | OUTPATIENT
Start: 2025-07-01 | End: 2026-07-02

## 2025-07-28 ENCOUNTER — HOSPITAL ENCOUNTER (OUTPATIENT)
Facility: CLINIC | Age: 70
Discharge: HOME | End: 2025-07-28
Payer: MEDICARE

## 2025-07-28 DIAGNOSIS — E11.42 TYPE 2 DIABETES MELLITUS WITH DIABETIC POLYNEUROPATHY, WITHOUT LONG-TERM CURRENT USE OF INSULIN (HCC): Chronic | ICD-10-CM

## 2025-07-28 DIAGNOSIS — E78.5 HYPERLIPIDEMIA, UNSPECIFIED HYPERLIPIDEMIA TYPE: ICD-10-CM

## 2025-07-28 DIAGNOSIS — E03.9 HYPOTHYROIDISM, UNSPECIFIED TYPE: ICD-10-CM

## 2025-07-28 DIAGNOSIS — M1A.09X0 CHRONIC GOUT OF MULTIPLE SITES, UNSPECIFIED CAUSE: ICD-10-CM

## 2025-07-28 LAB
ALBUMIN SERPL-MCNC: 4.3 G/DL (ref 3.5–5.2)
ALBUMIN/GLOB SERPL: 1.7 {RATIO} (ref 1–2.5)
ALP SERPL-CCNC: 63 U/L (ref 35–104)
ALT SERPL-CCNC: 46 U/L (ref 10–35)
ANION GAP SERPL CALCULATED.3IONS-SCNC: 12 MMOL/L (ref 9–16)
AST SERPL-CCNC: 36 U/L (ref 10–35)
BILIRUB SERPL-MCNC: 0.4 MG/DL (ref 0–1.2)
BUN SERPL-MCNC: 16 MG/DL (ref 8–23)
CALCIUM SERPL-MCNC: 10.3 MG/DL (ref 8.6–10.4)
CHLORIDE SERPL-SCNC: 103 MMOL/L (ref 98–107)
CHOLEST SERPL-MCNC: 118 MG/DL (ref 0–199)
CHOLESTEROL/HDL RATIO: 3.4
CO2 SERPL-SCNC: 24 MMOL/L (ref 20–31)
CREAT SERPL-MCNC: 1.4 MG/DL (ref 0.6–0.9)
EST. AVERAGE GLUCOSE BLD GHB EST-MCNC: 163 MG/DL
GFR, ESTIMATED: 41 ML/MIN/1.73M2
GLUCOSE SERPL-MCNC: 135 MG/DL (ref 74–99)
HBA1C MFR BLD: 7.3 % (ref 4–6)
HDLC SERPL-MCNC: 35 MG/DL
LDLC SERPL CALC-MCNC: 30 MG/DL (ref 0–100)
POTASSIUM SERPL-SCNC: 4.5 MMOL/L (ref 3.7–5.3)
PROT SERPL-MCNC: 6.9 G/DL (ref 6.6–8.7)
SODIUM SERPL-SCNC: 139 MMOL/L (ref 136–145)
TRIGL SERPL-MCNC: 265 MG/DL
TSH SERPL DL<=0.05 MIU/L-ACNC: 1.25 UIU/ML (ref 0.27–4.2)
URATE SERPL-MCNC: 9.6 MG/DL (ref 2.4–5.7)
VLDLC SERPL CALC-MCNC: 53 MG/DL (ref 1–30)

## 2025-07-28 PROCEDURE — 84443 ASSAY THYROID STIM HORMONE: CPT

## 2025-07-28 PROCEDURE — 84550 ASSAY OF BLOOD/URIC ACID: CPT

## 2025-07-28 PROCEDURE — 36415 COLL VENOUS BLD VENIPUNCTURE: CPT

## 2025-07-28 PROCEDURE — 80061 LIPID PANEL: CPT

## 2025-07-28 PROCEDURE — 80053 COMPREHEN METABOLIC PANEL: CPT

## 2025-07-28 PROCEDURE — 83036 HEMOGLOBIN GLYCOSYLATED A1C: CPT

## 2025-07-30 ENCOUNTER — OFFICE VISIT (OUTPATIENT)
Dept: FAMILY MEDICINE CLINIC | Age: 70
End: 2025-07-30

## 2025-07-30 VITALS
BODY MASS INDEX: 31.01 KG/M2 | TEMPERATURE: 97.9 F | OXYGEN SATURATION: 98 % | DIASTOLIC BLOOD PRESSURE: 72 MMHG | WEIGHT: 175 LBS | HEART RATE: 51 BPM | HEIGHT: 63 IN | SYSTOLIC BLOOD PRESSURE: 120 MMHG

## 2025-07-30 DIAGNOSIS — K21.9 GASTROESOPHAGEAL REFLUX DISEASE, UNSPECIFIED WHETHER ESOPHAGITIS PRESENT: ICD-10-CM

## 2025-07-30 DIAGNOSIS — E03.9 HYPOTHYROIDISM, UNSPECIFIED TYPE: ICD-10-CM

## 2025-07-30 DIAGNOSIS — R90.89 ABNORMAL BRAIN CT: ICD-10-CM

## 2025-07-30 DIAGNOSIS — F41.9 ANXIETY: ICD-10-CM

## 2025-07-30 DIAGNOSIS — Z86.19 HISTORY OF COLD SORES: ICD-10-CM

## 2025-07-30 DIAGNOSIS — M1A.09X0 CHRONIC GOUT OF MULTIPLE SITES, UNSPECIFIED CAUSE: ICD-10-CM

## 2025-07-30 DIAGNOSIS — R56.9 NEW ONSET SEIZURE WITHOUT HEAD TRAUMA (HCC): ICD-10-CM

## 2025-07-30 DIAGNOSIS — N18.31 STAGE 3A CHRONIC KIDNEY DISEASE (HCC): ICD-10-CM

## 2025-07-30 DIAGNOSIS — Z85.828 HISTORY OF BASAL CELL CARCINOMA (BCC) OF SKIN: ICD-10-CM

## 2025-07-30 DIAGNOSIS — Z85.828 HISTORY OF SCC (SQUAMOUS CELL CARCINOMA) OF SKIN: ICD-10-CM

## 2025-07-30 DIAGNOSIS — G45.9 TIA (TRANSIENT ISCHEMIC ATTACK): ICD-10-CM

## 2025-07-30 DIAGNOSIS — M16.11 PRIMARY OSTEOARTHRITIS OF RIGHT HIP: ICD-10-CM

## 2025-07-30 DIAGNOSIS — M51.16 LUMBAR DISC HERNIATION WITH RADICULOPATHY: ICD-10-CM

## 2025-07-30 DIAGNOSIS — E11.42 TYPE 2 DIABETES MELLITUS WITH DIABETIC POLYNEUROPATHY, WITHOUT LONG-TERM CURRENT USE OF INSULIN (HCC): Primary | ICD-10-CM

## 2025-07-30 DIAGNOSIS — I10 ESSENTIAL HYPERTENSION: ICD-10-CM

## 2025-07-30 DIAGNOSIS — K44.9 HIATAL HERNIA: ICD-10-CM

## 2025-07-30 DIAGNOSIS — E11.42 DIABETIC POLYNEUROPATHY ASSOCIATED WITH TYPE 2 DIABETES MELLITUS (HCC): ICD-10-CM

## 2025-07-30 DIAGNOSIS — R29.898 WEAKNESS OF RIGHT LEG: ICD-10-CM

## 2025-07-30 DIAGNOSIS — K63.5 POLYP OF COLON, UNSPECIFIED PART OF COLON, UNSPECIFIED TYPE: ICD-10-CM

## 2025-07-30 DIAGNOSIS — G47.33 OSA (OBSTRUCTIVE SLEEP APNEA): ICD-10-CM

## 2025-07-30 DIAGNOSIS — E78.5 HYPERLIPIDEMIA, UNSPECIFIED HYPERLIPIDEMIA TYPE: ICD-10-CM

## 2025-07-30 DIAGNOSIS — M54.16 LUMBAR BACK PAIN WITH RADICULOPATHY AFFECTING RIGHT LOWER EXTREMITY: ICD-10-CM

## 2025-07-30 DIAGNOSIS — R05.3 CHRONIC COUGH: ICD-10-CM

## 2025-07-30 RX ORDER — ALLOPURINOL 100 MG/1
200 TABLET ORAL DAILY
Qty: 180 TABLET | Refills: 1 | Status: SHIPPED | OUTPATIENT
Start: 2025-07-30

## 2025-07-30 RX ORDER — DAPAGLIFLOZIN 5 MG/1
5 TABLET, FILM COATED ORAL EVERY MORNING
Qty: 90 TABLET | Refills: 1 | Status: SHIPPED | OUTPATIENT
Start: 2025-07-30

## 2025-08-29 DIAGNOSIS — M1A.09X0 CHRONIC GOUT OF MULTIPLE SITES, UNSPECIFIED CAUSE: ICD-10-CM

## 2025-09-01 RX ORDER — COLCHICINE 0.6 MG/1
0.6 TABLET ORAL EVERY MORNING
Qty: 90 TABLET | Refills: 1 | Status: SHIPPED | OUTPATIENT
Start: 2025-09-01 | End: 2026-09-02